# Patient Record
Sex: FEMALE
[De-identification: names, ages, dates, MRNs, and addresses within clinical notes are randomized per-mention and may not be internally consistent; named-entity substitution may affect disease eponyms.]

---

## 2022-07-30 ENCOUNTER — TELEPHONE ENCOUNTER (OUTPATIENT)
Age: 67
End: 2022-07-30

## 2022-07-31 ENCOUNTER — TELEPHONE ENCOUNTER (OUTPATIENT)
Age: 67
End: 2022-07-31

## 2023-07-25 ENCOUNTER — OFFICE VISIT (OUTPATIENT)
Dept: FAMILY MEDICINE CLINIC | Facility: CLINIC | Age: 68
End: 2023-07-25
Payer: COMMERCIAL

## 2023-07-25 VITALS
WEIGHT: 191 LBS | OXYGEN SATURATION: 98 % | HEART RATE: 71 BPM | DIASTOLIC BLOOD PRESSURE: 85 MMHG | SYSTOLIC BLOOD PRESSURE: 133 MMHG | TEMPERATURE: 96.2 F | HEIGHT: 67 IN | BODY MASS INDEX: 29.98 KG/M2

## 2023-07-25 DIAGNOSIS — R42 DIZZINESS: ICD-10-CM

## 2023-07-25 DIAGNOSIS — Z76.89 ENCOUNTER TO ESTABLISH CARE: ICD-10-CM

## 2023-07-25 DIAGNOSIS — Z79.899 LONG TERM USE OF DRUG: ICD-10-CM

## 2023-07-25 DIAGNOSIS — E03.9 HYPOTHYROIDISM, UNSPECIFIED TYPE: ICD-10-CM

## 2023-07-25 DIAGNOSIS — K86.1 CHRONIC PANCREATITIS, UNSPECIFIED PANCREATITIS TYPE: Primary | ICD-10-CM

## 2023-07-25 DIAGNOSIS — E11.65 TYPE 2 DIABETES MELLITUS WITH HYPERGLYCEMIA, WITHOUT LONG-TERM CURRENT USE OF INSULIN: ICD-10-CM

## 2023-07-25 LAB
POC AMPHETAMINES: NEGATIVE
POC BARBITURATES: NEGATIVE
POC BENZODIAZEPHINES: NEGATIVE
POC COCAINE: NEGATIVE
POC METHADONE: NEGATIVE
POC METHAMPHETAMINE SCREEN URINE: NEGATIVE
POC OPIATES: NEGATIVE
POC OXYCODONE: NEGATIVE
POC PHENCYCLIDINE: NEGATIVE
POC PROPOXYPHENE: NEGATIVE
POC THC: NEGATIVE
POC TRICYCLIC ANTIDEPRESSANTS: NEGATIVE

## 2023-07-25 PROCEDURE — 99204 OFFICE O/P NEW MOD 45 MIN: CPT

## 2023-07-25 PROCEDURE — 80305 DRUG TEST PRSMV DIR OPT OBS: CPT

## 2023-07-25 RX ORDER — MAGNESIUM GLUCONATE 27 MG(500)
27 TABLET ORAL 2 TIMES DAILY
COMMUNITY

## 2023-07-25 RX ORDER — CHLORAL HYDRATE 500 MG
CAPSULE ORAL
COMMUNITY

## 2023-07-25 RX ORDER — ONDANSETRON 4 MG/1
4 TABLET, FILM COATED ORAL EVERY 8 HOURS PRN
COMMUNITY

## 2023-07-25 RX ORDER — LEVOTHYROXINE SODIUM 0.15 MG/1
150 TABLET ORAL DAILY
COMMUNITY
Start: 2023-05-10

## 2023-07-25 RX ORDER — HYDROXYZINE HYDROCHLORIDE 25 MG/1
25 TABLET, FILM COATED ORAL 3 TIMES DAILY PRN
Qty: 90 TABLET | Refills: 0 | Status: SHIPPED | OUTPATIENT
Start: 2023-07-25

## 2023-07-25 RX ORDER — MONTELUKAST SODIUM 10 MG/1
1 TABLET ORAL DAILY
COMMUNITY
Start: 2023-06-14

## 2023-07-25 RX ORDER — GLIPIZIDE 10 MG/1
10 TABLET ORAL
COMMUNITY
Start: 2023-06-14

## 2023-07-25 RX ORDER — INSULIN DETEMIR 100 [IU]/ML
8 INJECTION, SOLUTION SUBCUTANEOUS DAILY
Qty: 3 ML | Refills: 2 | Status: SHIPPED | OUTPATIENT
Start: 2023-07-25

## 2023-07-25 RX ORDER — PEN NEEDLE, DIABETIC 30 GX3/16"
1 NEEDLE, DISPOSABLE MISCELLANEOUS DAILY
Qty: 30 EACH | Refills: 2 | Status: SHIPPED | OUTPATIENT
Start: 2023-07-25

## 2023-07-25 RX ORDER — METHOCARBAMOL 500 MG/1
500 TABLET, FILM COATED ORAL DAILY PRN
COMMUNITY

## 2023-07-25 RX ORDER — ESTRADIOL 0.1 MG/G
2 CREAM VAGINAL DAILY PRN
COMMUNITY

## 2023-07-25 RX ORDER — LEVOTHYROXINE SODIUM 175 UG/1
175 TABLET ORAL DAILY
COMMUNITY
Start: 2023-05-10

## 2023-07-25 RX ORDER — ALBUTEROL SULFATE 90 UG/1
2 AEROSOL, METERED RESPIRATORY (INHALATION) EVERY 4 HOURS PRN
COMMUNITY

## 2023-07-25 RX ORDER — CLONAZEPAM 0.5 MG/1
0.5 TABLET ORAL NIGHTLY PRN
COMMUNITY
Start: 2023-03-30

## 2023-07-25 RX ORDER — CYANOCOBALAMIN (VITAMIN B-12) 1000 MCG
TABLET ORAL
COMMUNITY

## 2023-07-25 RX ORDER — SERTRALINE HYDROCHLORIDE 100 MG/1
100 TABLET, FILM COATED ORAL DAILY
COMMUNITY
Start: 2023-07-12

## 2023-07-25 RX ORDER — OMEPRAZOLE 40 MG/1
40 CAPSULE, DELAYED RELEASE ORAL DAILY
COMMUNITY

## 2023-07-25 RX ORDER — ALBUTEROL SULFATE 2.5 MG/.5ML
2.5 SOLUTION RESPIRATORY (INHALATION)
COMMUNITY

## 2023-07-25 RX ORDER — SODIUM PHOSPHATE,MONO-DIBASIC 19G-7G/118
ENEMA (ML) RECTAL
COMMUNITY

## 2023-07-25 RX ORDER — RIZATRIPTAN BENZOATE 10 MG/1
10 TABLET ORAL ONCE AS NEEDED
COMMUNITY
Start: 2023-03-30

## 2023-07-25 NOTE — PROGRESS NOTES
"Chief Complaint  Blood Sugar Problem and Pancreititis (Hx of/)    Subjective        Charlene Bey presents to [facility]IMARY [facility]  History of Present Illness  Patient is 67-year-old female who presents today as a new patient to establish care along with complaints of her blood glucose and pancreatitis. Reports her blood glucose has been running high over the last day. Patient looked at the report on her monitor and read of her blood glucose starting at 124 on 7/24 with it jumping to 257, 289, then 267 that evening. Reports her blood glucose this morning was 178 upon waking with it jumping to 247 after breakfast that consisted of toast, coffee, and a small amount of a protein shake. She describes using stevia for her sweetener in her coffee and the protein shake being a diabetic one.     Describes a history of having chronic pancreatitis throughout her life from childhood. She is currently experiencing nausea, abdominal pain, pruritus, and chalky grey stool. Nausea has been worsening lately with her being unable to keep food down. Describes the abdominal pain as if there is a pole through her abdomen starting in the epigastric region to the left under her ribs and radiating to her back. She has been taking ondansetron to be able to tolerate the nausea. Has been using tylenol for pain and is not wanting to take any stronger medication at this time.     She describes being dizzy and that things feel \"sloshy\" with things spinning. This is aggravated with turning her head and position changes. Is unsteady upon standing up. Reports a history of labyrinthitis.     Past Medical History:   Diagnosis Date    Cystocele with rectocele     Diabetes mellitus     Goiter     Hashimoto's thyroiditis     Headache     Hypothyroidism     Mumps pancreatitis     Pancreatitis     Peptic ulceration     Thyroid nodule      Past Surgical History:   Procedure Laterality Date    BLADDER SUSPENSION      CHOLECYSTECTOMY      " "COLOSTOMY      CYSTOCELE REPAIR      HERNIA REPAIR      HYSTERECTOMY      OVARIAN CYST REMOVAL      RECTOCELE REPAIR      SMALL INTESTINE SURGERY      TONSILLECTOMY       Social History     Socioeconomic History    Marital status:    Tobacco Use    Smoking status: Never    Smokeless tobacco: Never   Vaping Use    Vaping Use: Never used   Substance and Sexual Activity    Alcohol use: Never    Drug use: Never     Family History   Problem Relation Age of Onset    Cancer Mother     Stroke Father     Hypertension Father        Objective   Vital Signs:  /85 (BP Location: Left arm, Patient Position: Sitting, Cuff Size: Adult)   Pulse 71   Temp 96.2 °F (35.7 °C) (Infrared)   Ht 170.2 cm (67\")   Wt 86.6 kg (191 lb)   SpO2 98%   BMI 29.91 kg/m²   Estimated body mass index is 29.91 kg/m² as calculated from the following:    Height as of this encounter: 170.2 cm (67\").    Weight as of this encounter: 86.6 kg (191 lb).         PHQ-9 Depression Screening  Little interest or pleasure in doing things? 0-->not at all   Feeling down, depressed, or hopeless? 0-->not at all   Trouble falling or staying asleep, or sleeping too much?     Feeling tired or having little energy?     Poor appetite or overeating?     Feeling bad about yourself - or that you are a failure or have let yourself or your family down?     Trouble concentrating on things, such as reading the newspaper or watching television?     Moving or speaking so slowly that other people could have noticed? Or the opposite - being so fidgety or restless that you have been moving around a lot more than usual?     Thoughts that you would be better off dead, or of hurting yourself in some way?     PHQ-9 Total Score 0   If you checked off any problems, how difficult have these problems made it for you to do your work, take care of things at home, or get along with other people?        VALERIANO-7: Over the last two weeks, how often have you been bothered by the " following problems?  Feeling nervous, anxious or on edge: Not at all  Not being able to stop or control worrying: Not at all  Worrying too much about different things: Not at all  Trouble Relaxing: Not at all  Being so restless that it is hard to sit still: Not at all  Becoming easily annoyed or irritable: Not at all  Feeling afraid as if something awful might happen: Not at all  VALERIANO 7 Total Score: 0  If you checked any problems, how difficult have these problems made it for you to do your work, take care of things at home, or get along with other people: Not difficult at all      Physical Exam  Vitals and nursing note reviewed.   Constitutional:       Appearance: Normal appearance.   HENT:      Head: Normocephalic.      Right Ear: Tympanic membrane normal.      Left Ear: Tympanic membrane normal.      Nose: Nose normal.      Mouth/Throat:      Mouth: Mucous membranes are moist.   Eyes:      Pupils: Pupils are equal, round, and reactive to light.   Cardiovascular:      Rate and Rhythm: Normal rate and regular rhythm.      Pulses: Normal pulses.      Heart sounds: Normal heart sounds. No murmur heard.  Pulmonary:      Effort: Pulmonary effort is normal. No respiratory distress.      Breath sounds: Normal breath sounds.   Abdominal:      General: Bowel sounds are normal. There is no distension.      Palpations: Abdomen is soft.      Tenderness: There is abdominal tenderness (epigastric  region). There is guarding.   Musculoskeletal:         General: Normal range of motion.      Cervical back: Normal range of motion.   Skin:     General: Skin is warm and dry.      Capillary Refill: Capillary refill takes less than 2 seconds.   Neurological:      General: No focal deficit present.      Mental Status: She is alert.   Psychiatric:         Mood and Affect: Mood normal.      Result Review :                 Assessment and Plan   Diagnoses and all orders for this visit:    1. Chronic pancreatitis, unspecified pancreatitis  type (Primary)  -     CBC w AUTO Differential  -     Comprehensive Metabolic Panel  -     Amylase  -     Lipase    2. Type 2 diabetes mellitus with hyperglycemia, without long-term current use of insulin  -     CBC w AUTO Differential  -     Comprehensive Metabolic Panel  -     Hemoglobin A1c    3. Dizziness  -     hydrOXYzine (ATARAX) 25 MG tablet; Take 1 tablet by mouth 3 (Three) Times a Day As Needed (Dizziness).  Dispense: 90 tablet; Refill: 0    4. Hypothyroidism, unspecified type  -     TSH    5. Encounter to establish care    - Discussed liquid diet  - Continue to check blood glucose, will start low dose of insulin detemir nightly             Follow Up   Return in about 4 weeks (around 8/22/2023) for Recheck.  Patient was given instructions and counseling regarding her condition or for health maintenance advice. Please see specific information pulled into the AVS if appropriate.

## 2023-07-26 LAB
ALBUMIN SERPL-MCNC: 4.9 G/DL (ref 3.9–4.9)
ALBUMIN/GLOB SERPL: 1.6 {RATIO} (ref 1.2–2.2)
ALP SERPL-CCNC: 82 IU/L (ref 44–121)
ALT SERPL-CCNC: 21 IU/L (ref 0–32)
AMYLASE SERPL-CCNC: 45 U/L (ref 31–110)
AST SERPL-CCNC: 21 IU/L (ref 0–40)
BASOPHILS # BLD AUTO: 0 X10E3/UL (ref 0–0.2)
BASOPHILS NFR BLD AUTO: 0 %
BILIRUB SERPL-MCNC: 0.3 MG/DL (ref 0–1.2)
BUN SERPL-MCNC: 15 MG/DL (ref 8–27)
BUN/CREAT SERPL: 19 (ref 12–28)
CALCIUM SERPL-MCNC: 10.2 MG/DL (ref 8.7–10.3)
CHLORIDE SERPL-SCNC: 102 MMOL/L (ref 96–106)
CO2 SERPL-SCNC: 22 MMOL/L (ref 20–29)
CREAT SERPL-MCNC: 0.78 MG/DL (ref 0.57–1)
EGFRCR SERPLBLD CKD-EPI 2021: 83 ML/MIN/1.73
EOSINOPHIL # BLD AUTO: 0.1 X10E3/UL (ref 0–0.4)
EOSINOPHIL NFR BLD AUTO: 2 %
ERYTHROCYTE [DISTWIDTH] IN BLOOD BY AUTOMATED COUNT: 14.6 % (ref 11.7–15.4)
GLOBULIN SER CALC-MCNC: 3.1 G/DL (ref 1.5–4.5)
GLUCOSE SERPL-MCNC: 151 MG/DL (ref 70–99)
HBA1C MFR BLD: 8.1 % (ref 4.8–5.6)
HCT VFR BLD AUTO: 42 % (ref 34–46.6)
HGB BLD-MCNC: 14.1 G/DL (ref 11.1–15.9)
IMM GRANULOCYTES # BLD AUTO: 0 X10E3/UL (ref 0–0.1)
IMM GRANULOCYTES NFR BLD AUTO: 0 %
LIPASE SERPL-CCNC: 19 U/L (ref 14–72)
LYMPHOCYTES # BLD AUTO: 3.3 X10E3/UL (ref 0.7–3.1)
LYMPHOCYTES NFR BLD AUTO: 35 %
MCH RBC QN AUTO: 27.3 PG (ref 26.6–33)
MCHC RBC AUTO-ENTMCNC: 33.6 G/DL (ref 31.5–35.7)
MCV RBC AUTO: 81 FL (ref 79–97)
MONOCYTES # BLD AUTO: 0.6 X10E3/UL (ref 0.1–0.9)
MONOCYTES NFR BLD AUTO: 7 %
NEUTROPHILS # BLD AUTO: 5.4 X10E3/UL (ref 1.4–7)
NEUTROPHILS NFR BLD AUTO: 56 %
PLATELET # BLD AUTO: 215 X10E3/UL (ref 150–450)
POTASSIUM SERPL-SCNC: 4.2 MMOL/L (ref 3.5–5.2)
PROT SERPL-MCNC: 8 G/DL (ref 6–8.5)
RBC # BLD AUTO: 5.16 X10E6/UL (ref 3.77–5.28)
SODIUM SERPL-SCNC: 139 MMOL/L (ref 134–144)
TSH SERPL DL<=0.005 MIU/L-ACNC: 5 UIU/ML (ref 0.45–4.5)
WBC # BLD AUTO: 9.4 X10E3/UL (ref 3.4–10.8)

## 2023-08-23 ENCOUNTER — OFFICE VISIT (OUTPATIENT)
Dept: INTERNAL MEDICINE | Facility: CLINIC | Age: 68
End: 2023-08-23
Payer: COMMERCIAL

## 2023-08-23 ENCOUNTER — PATIENT ROUNDING (BHMG ONLY) (OUTPATIENT)
Dept: INTERNAL MEDICINE | Facility: CLINIC | Age: 68
End: 2023-08-23

## 2023-08-23 VITALS
DIASTOLIC BLOOD PRESSURE: 78 MMHG | TEMPERATURE: 96 F | HEART RATE: 64 BPM | HEIGHT: 67 IN | SYSTOLIC BLOOD PRESSURE: 131 MMHG | OXYGEN SATURATION: 98 % | BODY MASS INDEX: 30.92 KG/M2 | WEIGHT: 197 LBS

## 2023-08-23 DIAGNOSIS — M79.606 LOWER EXTREMITY PAIN, POSTERIOR, UNSPECIFIED LATERALITY: ICD-10-CM

## 2023-08-23 DIAGNOSIS — R11.0 NAUSEA: ICD-10-CM

## 2023-08-23 DIAGNOSIS — R42 DIZZINESS: ICD-10-CM

## 2023-08-23 DIAGNOSIS — E11.65 TYPE 2 DIABETES MELLITUS WITH HYPERGLYCEMIA, UNSPECIFIED WHETHER LONG TERM INSULIN USE: Primary | ICD-10-CM

## 2023-08-23 DIAGNOSIS — E66.9 CLASS 1 OBESITY WITHOUT SERIOUS COMORBIDITY WITH BODY MASS INDEX (BMI) OF 30.0 TO 30.9 IN ADULT, UNSPECIFIED OBESITY TYPE: ICD-10-CM

## 2023-08-23 DIAGNOSIS — M62.838 MUSCLE SPASMS OF BOTH LOWER EXTREMITIES: ICD-10-CM

## 2023-08-23 PROCEDURE — 99213 OFFICE O/P EST LOW 20 MIN: CPT

## 2023-08-23 RX ORDER — ONDANSETRON 4 MG/1
4 TABLET, FILM COATED ORAL EVERY 8 HOURS PRN
Qty: 30 TABLET | Refills: 2 | Status: SHIPPED | OUTPATIENT
Start: 2023-08-23

## 2023-08-23 RX ORDER — HYDROXYZINE HYDROCHLORIDE 25 MG/1
25 TABLET, FILM COATED ORAL DAILY PRN
Qty: 90 TABLET | Refills: 0 | Status: SHIPPED | OUTPATIENT
Start: 2023-08-23

## 2023-08-23 RX ORDER — METHOCARBAMOL 500 MG/1
500 TABLET, FILM COATED ORAL DAILY PRN
Qty: 90 TABLET | Refills: 0 | Status: SHIPPED | OUTPATIENT
Start: 2023-08-23

## 2023-08-23 NOTE — PROGRESS NOTES
August 23, 2023    Hello, may I speak with Charlene Bey?    My name is Swetha      [facility] am  [facility]  [address] [place] [address] [phone].    Before we get started may I verify your date of birth? 1955    I am calling to officially welcome you to our practice and ask about your recent visit. Is this a good time to talk? Yes    Tell me about your visit with us. What things went well?  I just loved, loved Alex, he is a pumpkin.        We're always looking for ways to make our patients' experiences even better. Do you have recommendations on ways we may improve?  No, it is so wonderful here.    Overall were you satisfied with your first visit to our practice?  Yes, very much       I appreciate you taking the time to speak with me today. Is there anything else I can do for you? No      Thank you, and have a great day.

## 2023-08-23 NOTE — PROGRESS NOTES
Chief Complaint  Diabetes (Follow up)    Subjective        Charlene Bey presents to [facility]  History of Present Illness  Patient is a 67-year-old female presenting today following up on her health.     Reports her blood glucose was 167 this morning. Describes her blood glucose being more controlled and has only seen it going up between 180 to 190 after eating. Has been finding her glucose to be in the normal range at times.     Continues to report having dizziness and itching. Feels the dizziness is slowly resolving with it not happening as often and not being as severe. Does complain of being sore and is attributing this to tensing her body up to keep her balance. Is working on weaning down the number of times taking atarax as she has found this has caused some headaches for her.     Complaining of pain in her upper posterior legs. This is exacerbated with doing yard work and sitting. She has been needing to use pillows to sit on such as when in the car or sitting at a desk. Reports no longer being able to sit in the floor. Has noticed she is gaining weight and is attributing this to not getting out as much with the pain preventing her from being active.     Past Medical History:   Diagnosis Date    Cystocele with rectocele     Diabetes mellitus     Goiter     Hashimoto's thyroiditis     Headache     Hypothyroidism     Mumps pancreatitis     Pancreatitis     Peptic ulceration     Thyroid nodule      Past Surgical History:   Procedure Laterality Date    BLADDER SUSPENSION      CHOLECYSTECTOMY      COLOSTOMY      CYSTOCELE REPAIR      HERNIA REPAIR      HYSTERECTOMY      OVARIAN CYST REMOVAL      RECTOCELE REPAIR      SMALL INTESTINE SURGERY      TONSILLECTOMY       Social History     Socioeconomic History    Marital status:    Tobacco Use    Smoking status: Never    Smokeless tobacco: Never   Vaping Use    Vaping Use: Never used   Substance and Sexual Activity    Alcohol use:  "Never    Drug use: Never     Family History   Problem Relation Age of Onset    Cancer Mother     Stroke Father     Hypertension Father        Objective   Vital Signs:  /78 (BP Location: Left arm, Patient Position: Sitting, Cuff Size: Adult)   Pulse 64   Temp 96 øF (35.6 øC) (Infrared)   Ht 170.2 cm (67\")   Wt 89.4 kg (197 lb)   SpO2 98%   BMI 30.85 kg/mý   Estimated body mass index is 30.85 kg/mý as calculated from the following:    Height as of this encounter: 170.2 cm (67\").    Weight as of this encounter: 89.4 kg (197 lb).       BMI is >= 30 and <35. (Class 1 Obesity). The following options were offered after discussion;: exercise counseling/recommendations and nutrition counseling/recommendations          Physical Exam  Vitals and nursing note reviewed.   Constitutional:       Appearance: Normal appearance.   HENT:      Head: Normocephalic.      Nose: Nose normal.      Mouth/Throat:      Mouth: Mucous membranes are moist.   Eyes:      Pupils: Pupils are equal, round, and reactive to light.   Cardiovascular:      Rate and Rhythm: Normal rate and regular rhythm.      Pulses: Normal pulses.   Pulmonary:      Effort: Pulmonary effort is normal. No respiratory distress.   Abdominal:      General: Bowel sounds are normal.      Palpations: Abdomen is soft.   Musculoskeletal:         General: Normal range of motion.      Cervical back: Normal range of motion and neck supple.   Skin:     General: Skin is warm and dry.      Capillary Refill: Capillary refill takes less than 2 seconds.   Neurological:      General: No focal deficit present.      Mental Status: She is alert.   Psychiatric:         Mood and Affect: Mood normal.      Result Review :  The following data was reviewed by: GREGORIO Howe on 08/23/2023:  Common labs          7/25/2023    14:17 8/22/2023    23:00   Common Labs   Glucose 151     BUN 15     Creatinine 0.78     Sodium 139     Potassium 4.2     Chloride 102     Calcium 10.2     Total " Protein 8.0     Albumin 4.9     Total Bilirubin 0.3     Alkaline Phosphatase 82     AST (SGOT) 21     ALT (SGPT) 21     WBC 9.4     Hemoglobin 14.1     Hematocrit 42.0     Platelets 215     Hemoglobin A1C 8.1     Microalbumin, Urine  17.7                   Assessment and Plan   Diagnoses and all orders for this visit:    1. Type 2 diabetes mellitus with hyperglycemia, unspecified whether long term insulin use (Primary)  -     Microalbumin / Creatinine Urine Ratio - Urine, Clean Catch    2. Dizziness  -     hydrOXYzine (ATARAX) 25 MG tablet; Take 1 tablet by mouth Daily As Needed (Dizziness).  Dispense: 90 tablet; Refill: 0  -     Ambulatory Referral to Physical Therapy Evaluate and treat    3. Muscle spasms of both lower extremities  -     methocarbamol (ROBAXIN) 500 MG tablet; Take 1 tablet by mouth Daily As Needed for Muscle Spasms.  Dispense: 90 tablet; Refill: 0  -     Ambulatory Referral to Physical Therapy Evaluate and treat    4. Nausea  -     ondansetron (ZOFRAN) 4 MG tablet; Take 1 tablet by mouth Every 8 (Eight) Hours As Needed for Nausea or Vomiting.  Dispense: 30 tablet; Refill: 2    5. Lower extremity pain, posterior, unspecified laterality  -     Ambulatory Referral to Physical Therapy Evaluate and treat    6. Class 1 obesity without serious comorbidity with body mass index (BMI) of 30.0 to 30.9 in adult, unspecified obesity type             Follow Up   Return in about 3 months (around 11/23/2023) for Recheck.  Patient was given instructions and counseling regarding her condition or for health maintenance advice. Please see specific information pulled into the AVS if appropriate.

## 2023-08-24 LAB
ALBUMIN/CREAT UR: 42 MG/G CREAT (ref 0–29)
CREAT UR-MCNC: 42.4 MG/DL
MICROALBUMIN UR-MCNC: 17.7 UG/ML

## 2023-09-13 DIAGNOSIS — E03.9 HYPOTHYROIDISM, UNSPECIFIED: ICD-10-CM

## 2023-09-13 RX ORDER — LEVOTHYROXINE SODIUM 175 UG/1
TABLET ORAL
Qty: 90 TABLET | Refills: 1 | Status: SHIPPED | OUTPATIENT
Start: 2023-09-13

## 2023-09-13 NOTE — TELEPHONE ENCOUNTER
Rx Refill Note  Requested Prescriptions     Pending Prescriptions Disp Refills    levothyroxine (SYNTHROID, LEVOTHROID) 175 MCG tablet [Pharmacy Med Name: LEVOTHYROXINE 175 MCG TABLET] 90 tablet      Si TABLET IN THE MORNING ON AN EMPTY STOMACH ORALLY MONDAY, WEDNESDAY, FRIDAY 90 DAYS      Last office visit with prescribing clinician: 2023   Last telemedicine visit with prescribing clinician: Visit date not found   Next office visit with prescribing clinician: 2023                         Would you like a call back once the refill request has been completed: [] Yes [] No    If the office needs to give you a call back, can they leave a voicemail: [] Yes [] No    Miley Sherwood MA  23, 10:22 CDT

## 2023-09-22 ENCOUNTER — OFFICE VISIT (OUTPATIENT)
Dept: FAMILY MEDICINE CLINIC | Facility: CLINIC | Age: 68
End: 2023-09-22
Payer: COMMERCIAL

## 2023-09-22 VITALS
DIASTOLIC BLOOD PRESSURE: 78 MMHG | SYSTOLIC BLOOD PRESSURE: 131 MMHG | HEIGHT: 67 IN | OXYGEN SATURATION: 99 % | HEART RATE: 64 BPM | BODY MASS INDEX: 30.92 KG/M2 | RESPIRATION RATE: 20 BRPM | WEIGHT: 197 LBS

## 2023-09-22 DIAGNOSIS — H01.004 BLEPHARITIS OF UPPER EYELIDS OF BOTH EYES, UNSPECIFIED TYPE: Primary | ICD-10-CM

## 2023-09-22 DIAGNOSIS — H01.001 BLEPHARITIS OF UPPER EYELIDS OF BOTH EYES, UNSPECIFIED TYPE: Primary | ICD-10-CM

## 2023-09-22 PROCEDURE — 99213 OFFICE O/P EST LOW 20 MIN: CPT | Performed by: NURSE PRACTITIONER

## 2023-09-22 RX ORDER — NEOMYCIN SULFATE, POLYMYXIN B SULFATE, BACITRACIN ZINC, HYDROCORTISONE 3.5; 10000; 400; 1 MG/G; [USP'U]/G; [USP'U]/G; MG/G
OINTMENT OPHTHALMIC 2 TIMES DAILY
Qty: 3.5 G | Refills: 0 | Status: SHIPPED | OUTPATIENT
Start: 2023-09-22

## 2023-09-22 RX ORDER — CEPHALEXIN 500 MG/1
500 CAPSULE ORAL 2 TIMES DAILY
Qty: 14 CAPSULE | Refills: 0 | Status: SHIPPED | OUTPATIENT
Start: 2023-09-22

## 2023-09-22 NOTE — PROGRESS NOTES
"Chief Complaint  swollen eye (Mrs. Bey is here for a swollen eye. )    Subjective        Charlene Bey presents to [facility]  History of Present Illness    Charlene Bey is a 67-year-old female who presents to the clinic today for right eye swelling and warmth.    The patient reports that she had what she thought was conjunctivitis in her left eye that started on 09/19/2023 but by 09/21/2023, she was fine. She woke up this morning, 09/22/2023, and noticed right ocular edema, fever, and rhinorrhea. She mentions she feels like there is grit in the eye and it is painful. She confirms that she has had these symptoms before. She denies any changes in vision, discharge, or crusting. She has taken Keflex in the past.      Objective   Vital Signs:  /78 (BP Location: Left arm, Patient Position: Sitting, Cuff Size: Adult)   Pulse 64   Resp 20   Ht 170.2 cm (67.01\")   Wt 89.4 kg (197 lb)   SpO2 99%   BMI 30.85 kg/m²   Estimated body mass index is 30.85 kg/m² as calculated from the following:    Height as of this encounter: 170.2 cm (67.01\").    Weight as of this encounter: 89.4 kg (197 lb).               Physical Exam  Vitals and nursing note reviewed.   Constitutional:       Appearance: She is well-developed.   HENT:      Head: Normocephalic and atraumatic.   Eyes:      General: Vision grossly intact.      Extraocular Movements: Extraocular movements intact.      Conjunctiva/sclera: Conjunctivae normal.        Comments: Swollen, erythematous   Cardiovascular:      Rate and Rhythm: Normal rate and regular rhythm.   Pulmonary:      Effort: Pulmonary effort is normal.   Musculoskeletal:      Cervical back: Normal range of motion.   Skin:     General: Skin is warm and dry.   Neurological:      General: No focal deficit present.      Mental Status: She is alert and oriented to person, place, and time.   Psychiatric:         Mood and Affect: Mood normal.         Behavior: Behavior " normal.      Result Review :                 Assessment and Plan   Diagnoses and all orders for this visit:    1. Blepharitis of upper eyelids of both eyes, unspecified type (Primary)  -     neomycin-bacitracin-polymyxin-hydrocortisone (CORTISPORIN) 1 % ophthalmic ointment; Administer  to both eyes 2 (Two) Times a Day.  Dispense: 3.5 g; Refill: 0  -     cephalexin (Keflex) 500 MG capsule; Take 1 capsule by mouth 2 (Two) Times a Day.  Dispense: 14 capsule; Refill: 0      Plan:  Will treat with topical and oral antibiotics  Recommend warm compresses  Continue antihistamines  Will let me know if not better on Monday and will send in steroid pack            - If her symptoms do not improve by Monday, 09/25/2023, she will contact the office for steroid prescription.      Transcribed from ambient dictation for GREGORIO Todd by Edith Jara.  09/22/23   12:58 CDT    Patient or patient representative verbalized consent to the visit recording.  I have personally performed the services described in this document as transcribed by the above individual, and it is both accurate and complete.      Follow Up   Return if symptoms worsen or fail to improve.  Patient was given instructions and counseling regarding her condition or for health maintenance advice. Please see specific information pulled into the AVS if appropriate.

## 2023-10-11 ENCOUNTER — TELEPHONE (OUTPATIENT)
Dept: INTERNAL MEDICINE | Facility: CLINIC | Age: 68
End: 2023-10-11

## 2023-10-11 DIAGNOSIS — U07.1 COVID-19: Primary | ICD-10-CM

## 2023-10-11 RX ORDER — BROMPHENIRAMINE MALEATE, PSEUDOEPHEDRINE HYDROCHLORIDE, AND DEXTROMETHORPHAN HYDROBROMIDE 2; 30; 10 MG/5ML; MG/5ML; MG/5ML
5 SYRUP ORAL EVERY 6 HOURS PRN
Qty: 120 ML | Refills: 0 | Status: ON HOLD | OUTPATIENT
Start: 2023-10-11 | End: 2023-10-16

## 2023-10-11 NOTE — TELEPHONE ENCOUNTER
Caller: Charlene Bey    Relationship: Self    Best call back number: [phone]     What medication are you requesting: PAXLOVID    What are your current symptoms: TESTED POSITIVE FOR COVID ON 10.11.23, RUNNY NOSE, HOARSE, HEADACHE, BODY ACHES, COUGH, SORE THROAT    How long have you been experiencing symptoms: ONE DAY    Have you had these symptoms before:    [] Yes  [x] No    Have you been treated for these symptoms before:   [] Yes  [x] No    If a prescription is needed, what is your preferred pharmacy and phone number: [facility]/PHARMACY #[id number] - [address] [phone] [facility] [phone]      Additional notes: PATIENT WOULD LIKE SOPHIE CALLED IN TO HELP HER WITH THE SYMPTOMS. PLEASE CALL BACK WHEN THIS HAS BEEN CALLED IN.

## 2023-10-16 ENCOUNTER — HOSPITAL ENCOUNTER (OUTPATIENT)
Facility: HOSPITAL | Age: 68
Setting detail: OBSERVATION
Discharge: HOME | End: 2023-10-20
Attending: STUDENT IN AN ORGANIZED HEALTH CARE EDUCATION/TRAINING PROGRAM | Admitting: STUDENT IN AN ORGANIZED HEALTH CARE EDUCATION/TRAINING PROGRAM
Payer: COMMERCIAL

## 2023-10-16 ENCOUNTER — APPOINTMENT (OUTPATIENT)
Dept: MRI IMAGING | Facility: HOSPITAL | Age: 68
End: 2023-10-16
Payer: COMMERCIAL

## 2023-10-16 DIAGNOSIS — M79.609 PARESTHESIA AND PAIN OF RIGHT EXTREMITY: ICD-10-CM

## 2023-10-16 DIAGNOSIS — R26.9 GAIT ABNORMALITY: Primary | ICD-10-CM

## 2023-10-16 DIAGNOSIS — R20.2 PARESTHESIA AND PAIN OF RIGHT EXTREMITY: ICD-10-CM

## 2023-10-16 PROBLEM — K86.1 CHRONIC PANCREATITIS: Status: ACTIVE | Noted: 2023-10-16

## 2023-10-16 PROBLEM — E11.65 TYPE 2 DIABETES MELLITUS WITH HYPERGLYCEMIA: Status: ACTIVE | Noted: 2023-10-16

## 2023-10-16 PROBLEM — E03.9 HYPOTHYROIDISM (ACQUIRED): Status: ACTIVE | Noted: 2023-10-16

## 2023-10-16 PROBLEM — G43.009 NONINTRACTABLE MIGRAINE: Status: ACTIVE | Noted: 2023-10-16

## 2023-10-16 PROBLEM — R42 DIZZINESS, NONSPECIFIC: Status: ACTIVE | Noted: 2023-10-16

## 2023-10-16 LAB
ALBUMIN SERPL-MCNC: 4.5 G/DL (ref 3.5–5.2)
ALBUMIN/GLOB SERPL: 1.7 G/DL
ALP SERPL-CCNC: 63 U/L (ref 39–117)
ALT SERPL W P-5'-P-CCNC: 16 U/L (ref 1–33)
ANION GAP SERPL CALCULATED.3IONS-SCNC: 8 MMOL/L (ref 5–15)
AST SERPL-CCNC: 15 U/L (ref 1–32)
BASOPHILS # BLD AUTO: 0.01 10*3/MM3 (ref 0–0.2)
BASOPHILS NFR BLD AUTO: 0.1 % (ref 0–1.5)
BILIRUB SERPL-MCNC: 0.2 MG/DL (ref 0–1.2)
BUN SERPL-MCNC: 16 MG/DL (ref 8–23)
BUN/CREAT SERPL: 21.3 (ref 7–25)
CALCIUM SPEC-SCNC: 9.2 MG/DL (ref 8.6–10.5)
CHLORIDE SERPL-SCNC: 104 MMOL/L (ref 98–107)
CHOLEST SERPL-MCNC: 180 MG/DL (ref 0–200)
CO2 SERPL-SCNC: 27 MMOL/L (ref 22–29)
CREAT SERPL-MCNC: 0.75 MG/DL (ref 0.57–1)
CRP SERPL-MCNC: <0.3 MG/DL (ref 0–0.5)
DEPRECATED RDW RBC AUTO: 46.8 FL (ref 37–54)
EGFRCR SERPLBLD CKD-EPI 2021: 87.4 ML/MIN/1.73
EOSINOPHIL # BLD AUTO: 0.04 10*3/MM3 (ref 0–0.4)
EOSINOPHIL NFR BLD AUTO: 0.5 % (ref 0.3–6.2)
ERYTHROCYTE [DISTWIDTH] IN BLOOD BY AUTOMATED COUNT: 15.3 % (ref 12.3–15.4)
ERYTHROCYTE [SEDIMENTATION RATE] IN BLOOD: 6 MM/HR (ref 0–30)
GLOBULIN UR ELPH-MCNC: 2.6 GM/DL
GLUCOSE BLDC GLUCOMTR-MCNC: 172 MG/DL (ref 70–130)
GLUCOSE BLDC GLUCOMTR-MCNC: 221 MG/DL (ref 70–130)
GLUCOSE SERPL-MCNC: 195 MG/DL (ref 65–99)
HBA1C MFR BLD: 7.8 % (ref 4.8–5.6)
HCT VFR BLD AUTO: 38.6 % (ref 34–46.6)
HDLC SERPL-MCNC: 34 MG/DL (ref 40–60)
HGB BLD-MCNC: 12.5 G/DL (ref 12–15.9)
IMM GRANULOCYTES # BLD AUTO: 0.02 10*3/MM3 (ref 0–0.05)
IMM GRANULOCYTES NFR BLD AUTO: 0.2 % (ref 0–0.5)
LDLC SERPL CALC-MCNC: 124 MG/DL (ref 0–100)
LDLC/HDLC SERPL: 3.57 {RATIO}
LYMPHOCYTES # BLD AUTO: 2.53 10*3/MM3 (ref 0.7–3.1)
LYMPHOCYTES NFR BLD AUTO: 31.2 % (ref 19.6–45.3)
MAGNESIUM SERPL-MCNC: 1.9 MG/DL (ref 1.6–2.4)
MCH RBC QN AUTO: 27.3 PG (ref 26.6–33)
MCHC RBC AUTO-ENTMCNC: 32.4 G/DL (ref 31.5–35.7)
MCV RBC AUTO: 84.3 FL (ref 79–97)
MONOCYTES # BLD AUTO: 0.34 10*3/MM3 (ref 0.1–0.9)
MONOCYTES NFR BLD AUTO: 4.2 % (ref 5–12)
NEUTROPHILS NFR BLD AUTO: 5.17 10*3/MM3 (ref 1.7–7)
NEUTROPHILS NFR BLD AUTO: 63.8 % (ref 42.7–76)
NRBC BLD AUTO-RTO: 0 /100 WBC (ref 0–0.2)
PHOSPHATE SERPL-MCNC: 2.7 MG/DL (ref 2.5–4.5)
PLATELET # BLD AUTO: 186 10*3/MM3 (ref 140–450)
PMV BLD AUTO: 9.7 FL (ref 6–12)
POTASSIUM SERPL-SCNC: 3.9 MMOL/L (ref 3.5–5.2)
PROT SERPL-MCNC: 7.1 G/DL (ref 6–8.5)
RBC # BLD AUTO: 4.58 10*6/MM3 (ref 3.77–5.28)
SARS-COV-2 RNA RESP QL NAA+PROBE: NOT DETECTED
SODIUM SERPL-SCNC: 139 MMOL/L (ref 136–145)
T4 FREE SERPL-MCNC: 0.96 NG/DL (ref 0.93–1.7)
TRIGL SERPL-MCNC: 123 MG/DL (ref 0–150)
TSH SERPL DL<=0.05 MIU/L-ACNC: 4.37 UIU/ML (ref 0.27–4.2)
VLDLC SERPL-MCNC: 22 MG/DL (ref 5–40)
WBC NRBC COR # BLD: 8.11 10*3/MM3 (ref 3.4–10.8)

## 2023-10-16 PROCEDURE — 82948 REAGENT STRIP/BLOOD GLUCOSE: CPT

## 2023-10-16 PROCEDURE — 82746 ASSAY OF FOLIC ACID SERUM: CPT | Performed by: STUDENT IN AN ORGANIZED HEALTH CARE EDUCATION/TRAINING PROGRAM

## 2023-10-16 PROCEDURE — 87635 SARS-COV-2 COVID-19 AMP PRB: CPT | Performed by: STUDENT IN AN ORGANIZED HEALTH CARE EDUCATION/TRAINING PROGRAM

## 2023-10-16 PROCEDURE — 86140 C-REACTIVE PROTEIN: CPT | Performed by: STUDENT IN AN ORGANIZED HEALTH CARE EDUCATION/TRAINING PROGRAM

## 2023-10-16 PROCEDURE — G0378 HOSPITAL OBSERVATION PER HR: HCPCS

## 2023-10-16 PROCEDURE — 63710000001 INSULIN LISPRO (HUMAN) PER 5 UNITS: Performed by: STUDENT IN AN ORGANIZED HEALTH CARE EDUCATION/TRAINING PROGRAM

## 2023-10-16 PROCEDURE — 84100 ASSAY OF PHOSPHORUS: CPT | Performed by: STUDENT IN AN ORGANIZED HEALTH CARE EDUCATION/TRAINING PROGRAM

## 2023-10-16 PROCEDURE — 83036 HEMOGLOBIN GLYCOSYLATED A1C: CPT | Performed by: STUDENT IN AN ORGANIZED HEALTH CARE EDUCATION/TRAINING PROGRAM

## 2023-10-16 PROCEDURE — G0379 DIRECT REFER HOSPITAL OBSERV: HCPCS

## 2023-10-16 PROCEDURE — 63710000001 INSULIN DETEMIR PER 5 UNITS: Performed by: STUDENT IN AN ORGANIZED HEALTH CARE EDUCATION/TRAINING PROGRAM

## 2023-10-16 PROCEDURE — 83735 ASSAY OF MAGNESIUM: CPT | Performed by: STUDENT IN AN ORGANIZED HEALTH CARE EDUCATION/TRAINING PROGRAM

## 2023-10-16 PROCEDURE — 80061 LIPID PANEL: CPT | Performed by: STUDENT IN AN ORGANIZED HEALTH CARE EDUCATION/TRAINING PROGRAM

## 2023-10-16 PROCEDURE — 72158 MRI LUMBAR SPINE W/O & W/DYE: CPT

## 2023-10-16 PROCEDURE — 84439 ASSAY OF FREE THYROXINE: CPT | Performed by: CLINICAL NURSE SPECIALIST

## 2023-10-16 PROCEDURE — 72157 MRI CHEST SPINE W/O & W/DYE: CPT

## 2023-10-16 PROCEDURE — 0 GADOBENATE DIMEGLUMINE 529 MG/ML SOLUTION: Performed by: STUDENT IN AN ORGANIZED HEALTH CARE EDUCATION/TRAINING PROGRAM

## 2023-10-16 PROCEDURE — 82607 VITAMIN B-12: CPT | Performed by: STUDENT IN AN ORGANIZED HEALTH CARE EDUCATION/TRAINING PROGRAM

## 2023-10-16 PROCEDURE — 99222 1ST HOSP IP/OBS MODERATE 55: CPT | Performed by: CLINICAL NURSE SPECIALIST

## 2023-10-16 PROCEDURE — 85652 RBC SED RATE AUTOMATED: CPT | Performed by: STUDENT IN AN ORGANIZED HEALTH CARE EDUCATION/TRAINING PROGRAM

## 2023-10-16 PROCEDURE — A9577 INJ MULTIHANCE: HCPCS | Performed by: STUDENT IN AN ORGANIZED HEALTH CARE EDUCATION/TRAINING PROGRAM

## 2023-10-16 PROCEDURE — 80050 GENERAL HEALTH PANEL: CPT | Performed by: STUDENT IN AN ORGANIZED HEALTH CARE EDUCATION/TRAINING PROGRAM

## 2023-10-16 RX ORDER — ACETAMINOPHEN 650 MG/1
650 SUPPOSITORY RECTAL EVERY 4 HOURS PRN
Status: DISCONTINUED | OUTPATIENT
Start: 2023-10-16 | End: 2023-10-20 | Stop reason: HOSPADM

## 2023-10-16 RX ORDER — SUMATRIPTAN 50 MG/1
50 TABLET, FILM COATED ORAL ONCE AS NEEDED
Status: COMPLETED | OUTPATIENT
Start: 2023-10-16 | End: 2023-10-17

## 2023-10-16 RX ORDER — METHOCARBAMOL 500 MG/1
500 TABLET, FILM COATED ORAL DAILY PRN
Status: DISCONTINUED | OUTPATIENT
Start: 2023-10-16 | End: 2023-10-20 | Stop reason: HOSPADM

## 2023-10-16 RX ORDER — SODIUM CHLORIDE 0.9 % (FLUSH) 0.9 %
10 SYRINGE (ML) INJECTION EVERY 12 HOURS SCHEDULED
Status: DISCONTINUED | OUTPATIENT
Start: 2023-10-16 | End: 2023-10-20 | Stop reason: HOSPADM

## 2023-10-16 RX ORDER — SODIUM CHLORIDE 0.9 % (FLUSH) 0.9 %
10 SYRINGE (ML) INJECTION AS NEEDED
Status: DISCONTINUED | OUTPATIENT
Start: 2023-10-16 | End: 2023-10-20 | Stop reason: HOSPADM

## 2023-10-16 RX ORDER — L.ACID,PARA/B.BIFIDUM/S.THERM 8B CELL
1 CAPSULE ORAL DAILY
Status: DISCONTINUED | OUTPATIENT
Start: 2023-10-16 | End: 2023-10-20 | Stop reason: HOSPADM

## 2023-10-16 RX ORDER — UREA 10 %
27 LOTION (ML) TOPICAL 2 TIMES DAILY
Status: DISCONTINUED | OUTPATIENT
Start: 2023-10-16 | End: 2023-10-20 | Stop reason: HOSPADM

## 2023-10-16 RX ORDER — NITROGLYCERIN 0.4 MG/1
0.4 TABLET SUBLINGUAL
Status: DISCONTINUED | OUTPATIENT
Start: 2023-10-16 | End: 2023-10-20 | Stop reason: HOSPADM

## 2023-10-16 RX ORDER — CALCIUM CARBONATE 500 MG/1
1 TABLET, CHEWABLE ORAL 2 TIMES DAILY PRN
Status: DISCONTINUED | OUTPATIENT
Start: 2023-10-16 | End: 2023-10-20 | Stop reason: HOSPADM

## 2023-10-16 RX ORDER — L.ACID,CASEI/B.ANIMAL/S.THERMO 16B CELL
1 CAPSULE ORAL DAILY
COMMUNITY

## 2023-10-16 RX ORDER — ACETAMINOPHEN 325 MG/1
650 TABLET ORAL EVERY 4 HOURS PRN
Status: DISCONTINUED | OUTPATIENT
Start: 2023-10-16 | End: 2023-10-20 | Stop reason: HOSPADM

## 2023-10-16 RX ORDER — INSULIN LISPRO 100 [IU]/ML
3-14 INJECTION, SOLUTION INTRAVENOUS; SUBCUTANEOUS
Status: DISCONTINUED | OUTPATIENT
Start: 2023-10-16 | End: 2023-10-20 | Stop reason: HOSPADM

## 2023-10-16 RX ORDER — BISACODYL 5 MG/1
5 TABLET, DELAYED RELEASE ORAL DAILY PRN
Status: DISCONTINUED | OUTPATIENT
Start: 2023-10-16 | End: 2023-10-20 | Stop reason: HOSPADM

## 2023-10-16 RX ORDER — BISACODYL 10 MG
10 SUPPOSITORY, RECTAL RECTAL DAILY PRN
Status: DISCONTINUED | OUTPATIENT
Start: 2023-10-16 | End: 2023-10-20 | Stop reason: HOSPADM

## 2023-10-16 RX ORDER — SERTRALINE HYDROCHLORIDE 100 MG/1
100 TABLET, FILM COATED ORAL DAILY
Status: DISCONTINUED | OUTPATIENT
Start: 2023-10-16 | End: 2023-10-20 | Stop reason: HOSPADM

## 2023-10-16 RX ORDER — DEXTROSE MONOHYDRATE 25 G/50ML
25 INJECTION, SOLUTION INTRAVENOUS
Status: DISCONTINUED | OUTPATIENT
Start: 2023-10-16 | End: 2023-10-20 | Stop reason: HOSPADM

## 2023-10-16 RX ORDER — ONDANSETRON 4 MG/1
4 TABLET, FILM COATED ORAL EVERY 6 HOURS PRN
Status: DISCONTINUED | OUTPATIENT
Start: 2023-10-16 | End: 2023-10-20 | Stop reason: HOSPADM

## 2023-10-16 RX ORDER — ONDANSETRON 2 MG/ML
4 INJECTION INTRAMUSCULAR; INTRAVENOUS EVERY 6 HOURS PRN
Status: DISCONTINUED | OUTPATIENT
Start: 2023-10-16 | End: 2023-10-20 | Stop reason: HOSPADM

## 2023-10-16 RX ORDER — LEVOTHYROXINE SODIUM 175 UG/1
175 TABLET ORAL
COMMUNITY

## 2023-10-16 RX ORDER — POLYETHYLENE GLYCOL 3350 17 G/17G
17 POWDER, FOR SOLUTION ORAL DAILY PRN
Status: DISCONTINUED | OUTPATIENT
Start: 2023-10-16 | End: 2023-10-20 | Stop reason: HOSPADM

## 2023-10-16 RX ORDER — HYDROCODONE BITARTRATE AND ACETAMINOPHEN 7.5; 325 MG/1; MG/1
1 TABLET ORAL EVERY 6 HOURS PRN
Status: DISCONTINUED | OUTPATIENT
Start: 2023-10-16 | End: 2023-10-20 | Stop reason: HOSPADM

## 2023-10-16 RX ORDER — AMOXICILLIN 250 MG
2 CAPSULE ORAL 2 TIMES DAILY
Status: DISCONTINUED | OUTPATIENT
Start: 2023-10-16 | End: 2023-10-20 | Stop reason: HOSPADM

## 2023-10-16 RX ORDER — MONTELUKAST SODIUM 10 MG/1
10 TABLET ORAL NIGHTLY
Status: DISCONTINUED | OUTPATIENT
Start: 2023-10-16 | End: 2023-10-20 | Stop reason: HOSPADM

## 2023-10-16 RX ORDER — NEOMYCIN SULFATE, POLYMYXIN B SULFATE, BACITRACIN ZINC, HYDROCORTISONE 3.5; 10000; 400; 1 MG/G; [USP'U]/G; [USP'U]/G; MG/G
1 OINTMENT OPHTHALMIC 2 TIMES DAILY
COMMUNITY
End: 2023-10-25

## 2023-10-16 RX ORDER — ACETAMINOPHEN 160 MG/5ML
650 SOLUTION ORAL EVERY 4 HOURS PRN
Status: DISCONTINUED | OUTPATIENT
Start: 2023-10-16 | End: 2023-10-20 | Stop reason: HOSPADM

## 2023-10-16 RX ORDER — GLIPIZIDE 10 MG/1
10 TABLET, FILM COATED, EXTENDED RELEASE ORAL DAILY
COMMUNITY

## 2023-10-16 RX ORDER — CHOLECALCIFEROL (VITAMIN D3) 125 MCG
5 CAPSULE ORAL NIGHTLY PRN
Status: DISCONTINUED | OUTPATIENT
Start: 2023-10-16 | End: 2023-10-20 | Stop reason: HOSPADM

## 2023-10-16 RX ORDER — SODIUM CHLORIDE 9 MG/ML
40 INJECTION, SOLUTION INTRAVENOUS AS NEEDED
Status: DISCONTINUED | OUTPATIENT
Start: 2023-10-16 | End: 2023-10-20 | Stop reason: HOSPADM

## 2023-10-16 RX ORDER — NICOTINE POLACRILEX 4 MG
15 LOZENGE BUCCAL
Status: DISCONTINUED | OUTPATIENT
Start: 2023-10-16 | End: 2023-10-20 | Stop reason: HOSPADM

## 2023-10-16 RX ORDER — CHOLECALCIFEROL (VITAMIN D3) 125 MCG
1000 CAPSULE ORAL DAILY
Status: DISCONTINUED | OUTPATIENT
Start: 2023-10-17 | End: 2023-10-20 | Stop reason: HOSPADM

## 2023-10-16 RX ADMIN — INSULIN LISPRO 5 UNITS: 100 INJECTION, SOLUTION INTRAVENOUS; SUBCUTANEOUS at 16:36

## 2023-10-16 RX ADMIN — INSULIN LISPRO 3 UNITS: 100 INJECTION, SOLUTION INTRAVENOUS; SUBCUTANEOUS at 20:40

## 2023-10-16 RX ADMIN — PANCRELIPASE 12000 UNITS OF LIPASE: 60000; 12000; 38000 CAPSULE, DELAYED RELEASE PELLETS ORAL at 17:25

## 2023-10-16 RX ADMIN — Medication 27 MG: at 21:43

## 2023-10-16 RX ADMIN — Medication 10 ML: at 20:42

## 2023-10-16 RX ADMIN — METHOCARBAMOL 500 MG: 500 TABLET, FILM COATED ORAL at 21:43

## 2023-10-16 RX ADMIN — GADOBENATE DIMEGLUMINE 17 ML: 529 INJECTION, SOLUTION INTRAVENOUS at 18:47

## 2023-10-16 RX ADMIN — Medication 1 CAPSULE: at 16:04

## 2023-10-16 RX ADMIN — MONTELUKAST 10 MG: 10 TABLET, FILM COATED ORAL at 20:40

## 2023-10-16 RX ADMIN — INSULIN DETEMIR 10 UNITS: 100 INJECTION, SOLUTION SUBCUTANEOUS at 20:40

## 2023-10-16 RX ADMIN — Medication 5000 UNITS: at 16:05

## 2023-10-16 RX ADMIN — SERTRALINE HYDROCHLORIDE 100 MG: 100 TABLET, FILM COATED ORAL at 16:05

## 2023-10-16 RX ADMIN — Medication 5 MG: at 23:09

## 2023-10-16 RX ADMIN — ACETAMINOPHEN 650 MG: 325 TABLET, FILM COATED ORAL at 23:09

## 2023-10-16 RX ADMIN — DOCUSATE SODIUM 50 MG AND SENNOSIDES 8.6 MG 2 TABLET: 8.6; 5 TABLET, FILM COATED ORAL at 20:40

## 2023-10-16 NOTE — CONSULTS
Neurology Consult Note    Consult Date: 10/16/2023  Referring MD: Elfego Overton MD  Reason for Consult: right lower extremity paresthesia    Patient: Charlene Bey (67 y.o. female)  MRN: [id number]  : 1955    History of Present Illness:   Charlene Bey is a 67 y.o. female with PMH DM, hashimoto thyroiditis, pancreatitis, peptic ulcer.  She has been having vertigo symptoms for about 1 month and has been doing vestibular rehab. Patient is right handed. She is retired  from assisted living. History obtained by patient and  who is at the bedside. Patient developed Covid symptoms about 5 days ago with fatigue, runny nose and cough. She did home Covid test that was negative and started Paxlovid. She tells me symptoms improved. She has been afebrile. She denies recent falls. This AM she woke in usual state and walked to the kitchen to make coffee. Upon return to the table she noted her right foot felt numb. The numbness progressed to the entire right side to upper abdomen. She also noted right lower extremity weakness and had difficulty raising right leg. She also notes pain that begins in abdomen and radiates to her back similar to prior pancreatitis attacks.  She denies vision changes, speech changes or involvement of RUE. She decided to get checked out and she needed assistance to the car and her  transported her to [facility]. CT head was negative, CT abd/pelvis showed large hiatal hernia, moderate amount of stool, fat containing umbilical and supra umbilical hernia, left and right ingquinal hernias. amylase/lipase WNL. TSH 9.92. Dr. JOSE Overton discussed case with Dr. ELLE Lynn and patient accepted for transfer.       Medical History:   Past Medical/Surgical Hx:  Past Medical History:   Diagnosis Date    Cystocele with rectocele     Diabetes mellitus     Goiter     Hashimoto's thyroiditis     Headache     Hypothyroidism     Mumps pancreatitis     Pancreatitis      Peptic ulceration     Thyroid nodule      Past Surgical History:   Procedure Laterality Date    BLADDER SUSPENSION      CHOLECYSTECTOMY      COLOSTOMY      CYSTOCELE REPAIR      HERNIA REPAIR      HYSTERECTOMY      OVARIAN CYST REMOVAL      RECTOCELE REPAIR      SMALL INTESTINE SURGERY      TONSILLECTOMY         Medications On Admission:  Medications Prior to Admission   Medication Sig Dispense Refill Last Dose    CALCIUM-MAGNESIUM-ZINC PO Take 1 tablet by mouth Daily.       Coenzyme Q10 (CoQ10) 100 MG capsule Take 3 capsules by mouth Daily.       Cyanocobalamin (B-12) 1000 MCG tablet Take 1 tablet by mouth Daily.       glipizide (GLUCOTROL XL) 10 MG 24 hr tablet Take 1 tablet by mouth Daily.       glucosamine-chondroitin 500-400 MG capsule capsule Take 1 capsule by mouth 3 (Three) Times a Day With Meals.       insulin detemir (Levemir FlexPen) 100 UNIT/ML injection Inject 8 Units under the skin into the appropriate area as directed Daily. 3 mL 2     levothyroxine (SYNTHROID, LEVOTHROID) 150 MCG tablet Take 1 tablet by mouth Daily. Am, tues/thurs/sa/su       levothyroxine (SYNTHROID, LEVOTHROID) 175 MCG tablet Take 1 tablet by mouth Every Morning. Monday, Wednesday and Friday       magnesium gluconate (MAGONATE) 500 MG tablet Take 1 tablet by mouth 2 (Two) Times a Day.       montelukast (SINGULAIR) 10 MG tablet Take 1 tablet by mouth Daily.       neomycin-bacitracin-polymyxin-hydrocortisone (CORTISPORIN) 1 % ophthalmic ointment Administer 1 application  to both eyes 2 (Two) Times a Day.       Omega-3 Fatty Acids (fish oil) 1000 MG capsule capsule Take 1 capsule by mouth Daily With Breakfast.       Pancrelipase, Lip-Prot-Amyl, (CREON) 38261-621120 units capsule delayed-release particles capsule Take 1 capsule by mouth 3 (Three) Times a Day With Meals.       Probiotic Product (Risaquad-2) capsule capsule Take 1 capsule by mouth Daily.       sertraline (ZOLOFT) 100 MG tablet Take 1 tablet by mouth Daily.       vitamin  D3 125 MCG (5000 UT) capsule capsule Take 1 capsule by mouth Daily.       estradiol (ESTRACE) 0.1 MG/GM vaginal cream Insert 2 g into the vagina Daily As Needed. 2-3 times wk       hydrOXYzine (ATARAX) 25 MG tablet Take 1 tablet by mouth Daily As Needed (Dizziness). 90 tablet 0     methocarbamol (ROBAXIN) 500 MG tablet Take 1 tablet by mouth Daily As Needed for Muscle Spasms. 90 tablet 0     ondansetron (ZOFRAN) 4 MG tablet Take 1 tablet by mouth Every 8 (Eight) Hours As Needed for Nausea or Vomiting. 30 tablet 2     rizatriptan (MAXALT) 10 MG tablet Take 1 tablet by mouth 1 (One) Time As Needed.          Current Medications:    Current Facility-Administered Medications:     acetaminophen (TYLENOL) tablet 650 mg, 650 mg, Oral, Q4H PRN **OR** acetaminophen (TYLENOL) 160 MG/5ML oral solution 650 mg, 650 mg, Oral, Q4H PRN **OR** acetaminophen (TYLENOL) suppository 650 mg, 650 mg, Rectal, Q4H PRN, Jose Avery MD    sennosides-docusate (PERICOLACE) 8.6-50 MG per tablet 2 tablet, 2 tablet, Oral, BID **AND** polyethylene glycol (MIRALAX) packet 17 g, 17 g, Oral, Daily PRN **AND** bisacodyl (DULCOLAX) EC tablet 5 mg, 5 mg, Oral, Daily PRN **AND** bisacodyl (DULCOLAX) suppository 10 mg, 10 mg, Rectal, Daily PRN, Jose Avery MD    calcium carbonate (TUMS) chewable tablet 500 mg (200 mg elemental), 1 tablet, Oral, BID PRN, Jose Avery MD    Calcium Replacement - Follow Nurse / BPA Driven Protocol, , Does not apply, PRN, Jose Avery MD    dextrose (D50W) (25 g/50 mL) IV injection 25 g, 25 g, Intravenous, Q15 Min PRN, Jose Avery MD    dextrose (GLUTOSE) oral gel 15 g, 15 g, Oral, Q15 Min PRN, Jose Avery MD    glucagon (GLUCAGEN) injection 1 mg, 1 mg, Intramuscular, Q15 Min PRN, Jose Avery MD    HYDROcodone-acetaminophen (NORCO) 7.5-325 MG per tablet 1 tablet, 1 tablet, Oral, Q6H PRN, Jose Avery MD    insulin detemir (LEVEMIR) injection 10 Units, 10 Units,  Subcutaneous, Nightly, Jose Avery MD    Insulin Lispro (humaLOG) injection 3-14 Units, 3-14 Units, Subcutaneous, 4x Daily AC & at Bedtime, Jose Avery MD    lactobacillus acidophilus (RISAQUAD) capsule 1 capsule, 1 capsule, Oral, Daily, Jose Avery MD    [START ON 10/17/2023] levothyroxine (SYNTHROID, LEVOTHROID) tablet 175 mcg, 175 mcg, Oral, Q AM, Jose Avery MD    magnesium (as) gluconate (MAGONATE) tablet 27 mg, 27 mg, Oral, BID, Jose Avery MD    Magnesium Standard Dose Replacement - Follow Nurse / BPA Driven Protocol, , Does not apply, PRN, Jose Avery MD    melatonin tablet 5 mg, 5 mg, Oral, Nightly PRN, Jose Avery MD    methocarbamol (ROBAXIN) tablet 500 mg, 500 mg, Oral, Daily PRN, Jose Avery MD    montelukast (SINGULAIR) tablet 10 mg, 10 mg, Oral, Nightly, Jose Avery MD    nitroglycerin (NITROSTAT) SL tablet 0.4 mg, 0.4 mg, Sublingual, Q5 Min PRN, Jose Avery MD    ondansetron (ZOFRAN) tablet 4 mg, 4 mg, Oral, Q6H PRN **OR** ondansetron (ZOFRAN) injection 4 mg, 4 mg, Intravenous, Q6H PRN, Jose Avery MD    pancrelipase (Lip-Prot-Amyl) (CREON) capsule 12,000 units of lipase, 12,000 units of lipase, Oral, TID With Meals, Jose Avery MD    Phosphorus Replacement - Follow Nurse / BPA Driven Protocol, , Does not apply, PRN, Jose Avery MD    Potassium Replacement - Follow Nurse / BPA Driven Protocol, , Does not apply, PRN, Jose Avery MD    sertraline (ZOLOFT) tablet 100 mg, 100 mg, Oral, Daily, Jose Avery MD    sodium chloride 0.9 % flush 10 mL, 10 mL, Intravenous, Q12H, Jose Avery MD    sodium chloride 0.9 % flush 10 mL, 10 mL, Intravenous, PRN, Jose Avery MD    sodium chloride 0.9 % infusion 40 mL, 40 mL, Intravenous, PRN, Jose Avery MD    SUMAtriptan (IMITREX) tablet 50 mg, 50 mg, Oral, Once PRN, Jose Avery MD    [START ON 10/17/2023] vitamin B-12  "(CYANOCOBALAMIN) tablet 1,000 mcg, 1,000 mcg, Oral, Daily, Jose Avery MD    vitamin D3 capsule 5,000 Units, 5,000 Units, Oral, Daily, Jose Avery MD     Allergies:  Allergies   Allergen Reactions    Compazine [Prochlorperazine] Seizure    Erythromycin Itching and Other (See Comments)     Balance issues      Ultram [Tramadol] Other (See Comments)     faints    Adhesive Tape Rash       Social Hx:  Social History     Socioeconomic History    Marital status:    Tobacco Use    Smoking status: Never    Smokeless tobacco: Never   Vaping Use    Vaping Use: Never used   Substance and Sexual Activity    Alcohol use: Never    Drug use: Never       Family Hx:  Family History   Problem Relation Age of Onset    Cancer Mother     Stroke Father     Hypertension Father      Physical Examination:   Vital Signs:  Vitals:    10/16/23 1247   BP: 124/59   BP Location: Right arm   Patient Position: Lying   Pulse: 56   Resp: 18   Temp: 98.6 °F (37 °C)   TempSrc: Oral   SpO2: 99%   Weight: 87.5 kg (193 lb)   Height: 170.2 cm (67\")       General Exam:  Head:  Normocephalic, atraumatic  HEENT:  Neck supple  Fundoscopic Exam:  No signs of disc edema  CVS:  Regular rate and rhythm.  No murmurs  Carotid Examination:  No bruits  Lungs:  Clear to auscultation  Abdomen:  Nontender, Nondistended  Extremities:  No signs of peripheral edema  Skin:  No rashes    Neurologic Exam:    Mental Status:    -Awake, Alert, Oriented X 3  -No word finding difficulties  -No aphasia  -No dysarthria  -Follows simple and complex commands    CN II:  Visual fields full.  Pupils equally reactive to light  CN III, IV, VI:  Extraocular Muscles full with no signs of nystagmus  CN V:  Facial sensory is symmetric with no asymmetries.  CN VII:  Facial motor asymmetric with slight lag right upper lip.   CN VIII:  Gross hearing intact bilaterally  CN IX:  Palate elevates symmetrically  CN X:  Palate elevates symmetrically  CN XI:  Shoulder shrug " "symmetric  CN XII:  Tongue is midline on protrusion    Motor: (strength out of 5:  1= minimal movement, 2 = movement in plane of gravity, 3 = movement against gravity, 4 = movement against some resistance, 5 = full strength)    -Right Upper Ext: Proximal: 5 Distal: 5  -Left Upper Ext: Proximal: 5 Distal: 5    -Right Lower Ext: unable to hold against gravity and immedieately falls to the bed. 1/5 plantar and dorsiflexion  -Left Lower Ext: Proximal: 5 Distal: 5    DTR:  -Right   Biceps: 2+ Triceps: 2+ Brachioradialis: 2+   Patella: 2+ Ankle: 1+ Neg Babinski  -Left   Biceps: 2+ Triceps: 2+ Brachioradialis: 2+   Patella: 2+ Ankle: 2+ Neg Babinski    Sensory:  -decrease/altered sensation to light touch, pinprick, temperature, on right about T7 anterior and posterior on right side to midline and right lower extremity.     Coordination:  -Finger to nose intact  -Heel to shin intact on left, unable to perform on right.   -No ataxia    Gait  -not tested for safety reasons.     Recent Diagnostics:   Laboratory Results:   - Reviewed in EMR  Lab Results   Component Value Date    GLUCOSE 151 (H) 07/25/2023    CALCIUM 10.2 07/25/2023     07/25/2023    K 4.2 07/25/2023    CO2 22 07/25/2023     07/25/2023    BUN 15 07/25/2023    CREATININE 0.78 07/25/2023    BCR 19 07/25/2023     Lab Results   Component Value Date    WBC 9.4 07/25/2023    HGB 14.1 07/25/2023    HCT 42.0 07/25/2023    MCV 81 07/25/2023     07/25/2023     No results found for: \"PTT\", \"INR\"  No results found for: \"CHOLTOT\", \"TRIG\", \"HDL\", \"LDL\"  Lab Results   Component Value Date    HGBA1C 8.1 (H) 07/25/2023       Imaging Results:  Imaging Results (Last 24 Hours)       ** No results found for the last 24 hours. **             Assessment & Plan:   Patient PMH DM, hashimoto thyroiditis, pancreatitis, peptic ulcer.  She has been having vertigo symptoms for about 1 month and has been doing vestibular rehab. Patient is right handed. She is retired "  from assisted living. History obtained by patient and  who is at the bedside. Patient developed Covid symptoms about 5 days ago with fatigue, runny nose and cough. She did home Covid test that was negative and started Paxlovid. She tells me symptoms improved. She has been afebrile. She denies recent falls. This AM she woke in usual state and walked to the kitchen to make coffee. Upon return to the table she noted her right foot felt numb. The numbness progressed to the entire right side to upper abdomen. She also noted right lower extremity weakness and had difficulty raising right leg. She also notes pain that begins in abdomen and radiates to her back similar to prior pancreatitis attacks.  She denies vision changes, speech changes or involvement of RUE. She decided to get checked out and she needed assistance to the car and her  transported her to [facility]. CT head was negative, amylase/lipase WNL.    Impression:  Acute onset of right lower extremity paresthesia that radiated to right trunk to about T7 with RLE weakness.  Covid + on 10/11/2023  DM  Thyroid disorder        Plan:   Differential diagnosis is broad at this point but would like to rule out transverse myelitis, also consider Guillain- Wise. Will get MRI TS with and without contrast and MRI LS with and without contrast.  Check A1C, Lipid panel, B12, foalte, Mg+, TSH, T4CRP, Sed rate  OT/PT.  Will consider MRI brain after #1.     Sherlyn Fernandes, APRN  10/16/23  15:52 CDT    Medical Decision Making    Number/Complexity of Problems  Moderate  1 undiagnosed new problem with uncertain prognosis -   1 acute illness with systemic symptoms -   High  1 acute or chronic illness that poses a threat to life/body function -   High  Acute onset of RLE weakness and paresthesia.     MDM Data  Moderate - 1/3 categories  Extensive - 2/3 categories    Category 1: 3 of the following  Review of external notes  Review of  results  Ordering of each unique test  Independent historian  Category 2:  Independent interpretation of test (ex: imaging)  Category 3:  Discussion of management with another provider    Extensive, I have personally reviewed records from OSH and obtained HPI     Treatment Plan  Moderate - Prescription Drug management  High  Drug therapy requiring intensive monitoring for toxicity  Decision regarding hospitalization or escalation of care  De-escalate care/DNR decisions  high

## 2023-10-16 NOTE — PLAN OF CARE
Goal Outcome Evaluation:  Plan of Care Reviewed With: patient, spouse        Progress: improving  Outcome Evaluation: Pt. A&Ox4, VSS, received from EMS this shift and telemetry placed, contact precautions initiated, COVID test negative, MD made aware, MRI scheduled, Pt with unsteady gait, RLE weakness, ambulates x2 to bathroom, safety maintained, plan of care ongoing.

## 2023-10-16 NOTE — NURSING NOTE
Received pt. as direct admit from [facility].  at bedside, pt. changed into gown and ambulated to bathroom x 2 assist. RLE weak and numb and struggles to lift leg well for ambulation. Walker brought to room. Awaiting orders.

## 2023-10-16 NOTE — H&P
[facility] Medicine Services  HISTORY AND PHYSICAL    Date of Admission: 10/16/2023  Primary Care Physician: Alex Saravia APRN    Subjective   Primary Historian: Patient     Chief Complaint: right leg numbness    History of Present Illness  Patient is a 67-year-old female with history of hypothyroidism, type 2 diabetes, chronic pancreatitis, migraine headaches, dizziness who was transferred from [facility] ER due to acute right lower extremity numbness and tingling sensation affecting her gait.  Patient did have a CT of the head completed at the facility that did not show any acute process.  CT abdomen pelvis from outside facility showed hepatic steatosis, moderately large hiatal hernia, colonic diverticulosis, umbilical supraumbilical hernia.  In-house neurologist Dr. Lynn was notified about this patient before transferred who recommends admitting patient to the hospitalist team and consulting neurology.    Patient was seen when she got to the floor patient states she developed right lower extremity numbness and tingling sensation that started this morning.  She states the paresthesias seem to be moving upwards only on the right side.  Patient denies respiratory distress.        Review of Systems   Otherwise complete ROS reviewed and negative except as mentioned in the HPI.    Past Medical History:   Past Medical History:   Diagnosis Date    Cystocele with rectocele     Diabetes mellitus     Goiter     Hashimoto's thyroiditis     Headache     Hypothyroidism     Mumps pancreatitis     Pancreatitis     Peptic ulceration     Thyroid nodule      Past Surgical History:  Past Surgical History:   Procedure Laterality Date    BLADDER SUSPENSION      CHOLECYSTECTOMY      COLOSTOMY      CYSTOCELE REPAIR      HERNIA REPAIR      HYSTERECTOMY      OVARIAN CYST REMOVAL      RECTOCELE REPAIR      SMALL INTESTINE SURGERY      TONSILLECTOMY       Social History:  reports that she  has never smoked. She has never used smokeless tobacco. She reports that she does not drink alcohol and does not use drugs.    Family History: family history includes Cancer in her mother; Hypertension in her father; Stroke in her father.       Allergies:  Allergies   Allergen Reactions    Compazine [Prochlorperazine] Seizure    Erythromycin Itching and Other (See Comments)     Balance issues      Ultram [Tramadol] Other (See Comments)     faints    Adhesive Tape Rash       Medications:  Prior to Admission medications    Medication Sig Start Date End Date Taking? Authorizing Provider   CALCIUM-MAGNESIUM-ZINC PO Take 1 tablet by mouth Daily.   Yes Anamaria Kenyon MD   Coenzyme Q10 (CoQ10) 100 MG capsule Take 3 capsules by mouth Daily.   Yes Anamaria Kenyon MD   Cyanocobalamin (B-12) 1000 MCG tablet Take 1 tablet by mouth Daily.   Yes Anamaria Kenyon MD   glipizide (GLUCOTROL XL) 10 MG 24 hr tablet Take 1 tablet by mouth Daily.   Yes Anamaria Kenyon MD   glucosamine-chondroitin 500-400 MG capsule capsule Take 1 capsule by mouth 3 (Three) Times a Day With Meals.   Yes Anamaria Kenyon MD   insulin detemir (Levemir FlexPen) 100 UNIT/ML injection Inject 8 Units under the skin into the appropriate area as directed Daily. 7/25/23  Yes Alex Saravia APRN   levothyroxine (SYNTHROID, LEVOTHROID) 150 MCG tablet Take 1 tablet by mouth Daily. Am, tues/thurs/sa/land 5/10/23  Yes Anamaria Kenyon MD   levothyroxine (SYNTHROID, LEVOTHROID) 175 MCG tablet Take 1 tablet by mouth Every Morning. Monday, Wednesday and Friday   Yes Anamaria Kenyon MD   magnesium gluconate (MAGONATE) 500 MG tablet Take 1 tablet by mouth 2 (Two) Times a Day.   Yes Anamaria Kenyon MD   montelukast (SINGULAIR) 10 MG tablet Take 1 tablet by mouth Daily. 6/14/23  Yes Anamaria Kenyon MD   neomycin-bacitracin-polymyxin-hydrocortisone (CORTISPORIN) 1 % ophthalmic ointment Administer 1 application  to both eyes  2 (Two) Times a Day.   Yes Anamaria Kenyon MD   Omega-3 Fatty Acids (fish oil) 1000 MG capsule capsule Take 1 capsule by mouth Daily With Breakfast.   Yes Anamaria Kenyon MD   Pancrelipase, Lip-Prot-Amyl, (CREON) 79485-990235 units capsule delayed-release particles capsule Take 1 capsule by mouth 3 (Three) Times a Day With Meals.   Yes Anamaria Kenyon MD   Probiotic Product (Risaquad-2) capsule capsule Take 1 capsule by mouth Daily.   Yes Anamaria Kenyon MD   sertraline (ZOLOFT) 100 MG tablet Take 1 tablet by mouth Daily. 7/12/23  Yes Anamaria Kenyon MD   vitamin D3 125 MCG (5000 UT) capsule capsule Take 1 capsule by mouth Daily.   Yes Anamaria Kenyon MD   omeprazole (priLOSEC) 40 MG capsule Take 1 capsule by mouth Daily.  10/16/23 Yes Anamaria Kenyon MD   estradiol (ESTRACE) 0.1 MG/GM vaginal cream Insert 2 g into the vagina Daily As Needed. 2-3 times wk    Anamaria Kenyon MD   hydrOXYzine (ATARAX) 25 MG tablet Take 1 tablet by mouth Daily As Needed (Dizziness). 8/23/23   Alex Saravia APRN   methocarbamol (ROBAXIN) 500 MG tablet Take 1 tablet by mouth Daily As Needed for Muscle Spasms. 8/23/23   Alex Saravia APRN   ondansetron (ZOFRAN) 4 MG tablet Take 1 tablet by mouth Every 8 (Eight) Hours As Needed for Nausea or Vomiting. 8/23/23   Alex Saravia APRN   rizatriptan (MAXALT) 10 MG tablet Take 1 tablet by mouth 1 (One) Time As Needed. 3/30/23   Anamaria Kenyon MD   albuterol (PROVENTIL) 2.5 MG/0.5ML nebulizer solution Take 2.5 mg by nebulization Every 8 (Eight) Hours As Needed for Wheezing or Shortness of Air (for 30 days).  10/16/23  Anamaria Kenyon MD   albuterol sulfate  (90 Base) MCG/ACT inhaler Inhale 2 puffs Every 4 (Four) Hours As Needed for Wheezing.  10/16/23  Anamaria Kenyon MD   brompheniramine-pseudoephedrine-DM 30-2-10 MG/5ML syrup Take 5 mL by mouth Every 6 (Six) Hours As Needed for Congestion or Cough.  Patient not  "taking: Reported on 10/16/2023 10/11/23 10/16/23  Alex Saravia APRN   cephalexin (Keflex) 500 MG capsule Take 1 capsule by mouth 2 (Two) Times a Day.  Patient not taking: Reported on 10/16/2023 9/22/23 10/16/23  Digna Dominguez APRN   clonazePAM (KlonoPIN) 0.5 MG tablet Take 1 tablet by mouth At Night As Needed.  Patient not taking: Reported on 10/16/2023 3/30/23 10/16/23  Anamaria Kenyon MD   glipizide (GLUCOTROL) 10 MG tablet Take 1 tablet by mouth 2 (Two) Times a Day Before Meals. 6/14/23 10/16/23  Anamaria Kenyon MD   Insulin Pen Needle (Pen Needles) 31G X 8 MM misc 1 each Daily. 7/25/23 10/16/23  Alex Saravia APRN   levothyroxine (SYNTHROID, LEVOTHROID) 175 MCG tablet 1 TABLET IN THE MORNING ON AN EMPTY STOMACH ORALLY MONDAY, WEDNESDAY, FRIDAY 90 DAYS 9/13/23 10/16/23  Alex Saravia APRN   neomycin-bacitracin-polymyxin-hydrocortisone (CORTISPORIN) 1 % ophthalmic ointment Administer  to both eyes 2 (Two) Times a Day. 9/22/23 10/16/23  Digna Dominguez APRN   Nirmatrelvir&Ritonavir 300/100 (PAXLOVID) 20 x 150 MG & 10 x 100MG tablet therapy pack tablet Take 3 tablets by mouth 2 (Two) Times a Day for 5 days.  Patient not taking: Reported on 10/16/2023 10/11/23 10/16/23  Alex Saravia APRN   Probiotic Product (PROBIOTIC DAILY PO) Take  by mouth.  10/16/23  Anamaria Kenyon MD     I have utilized all available immediate resources to obtain, update, or review the patient's current medications (including all prescriptions, over-the-counter products, herbals, cannabis/cannabidiol products, and vitamin/mineral/dietary (nutritional) supplements).    Objective     Vital Signs: /59 (BP Location: Right arm, Patient Position: Lying)   Pulse 56   Temp 98.6 °F (37 °C) (Oral)   Resp 18   Ht 170.2 cm (67\")   Wt 87.5 kg (193 lb)   SpO2 99%   BMI 30.23 kg/m²   Physical Exam  Vitals and nursing note reviewed.   Constitutional:       General: She is not in acute distress.     " Appearance: Normal appearance. She is not diaphoretic.   HENT:      Head: Normocephalic and atraumatic.      Right Ear: External ear normal.      Left Ear: External ear normal.   Eyes:      General: No scleral icterus.     Extraocular Movements: Extraocular movements intact.      Conjunctiva/sclera: Conjunctivae normal.   Cardiovascular:      Rate and Rhythm: Normal rate and regular rhythm.      Heart sounds: Normal heart sounds.   Pulmonary:      Effort: Pulmonary effort is normal. No respiratory distress.      Breath sounds: Normal breath sounds.   Chest:      Chest wall: No tenderness.   Abdominal:      General: Bowel sounds are normal.      Palpations: Abdomen is soft.      Tenderness: There is no abdominal tenderness.   Musculoskeletal:         General: No swelling or tenderness.      Cervical back: Normal range of motion and neck supple.      Right lower leg: No edema.      Left lower leg: No edema.   Skin:     General: Skin is warm and dry.      Capillary Refill: Capillary refill takes less than 2 seconds.      Findings: No erythema or rash.   Neurological:      General: No focal deficit present.      Mental Status: She is alert and oriented to person, place, and time.      Cranial Nerves: No cranial nerve deficit.      Sensory: Sensory deficit (right LE) present.      Motor: Weakness (3/5 MS of right LE) present.      Coordination: Coordination normal.      Gait: Gait abnormal.   Psychiatric:         Behavior: Behavior normal.              Results Reviewed:  Lab Results (last 24 hours)       ** No results found for the last 24 hours. **          Imaging Results (Last 24 Hours)       ** No results found for the last 24 hours. **          I have personally reviewed and interpreted the radiology studies and ECG obtained at time of admission.     Assessment / Plan   Assessment:   Active Hospital Problems    Diagnosis     **Paresthesia and pain of right extremity     Type 2 diabetes mellitus with hyperglycemia      Hypothyroidism (acquired)     Chronic pancreatitis     Nonintractable migraine     Dizziness, nonspecific      Patient is a 67-year-old female with history of hypothyroidism, type 2 diabetes, chronic pancreatitis, migraine headaches, dizziness who was transferred from [facility] due to acute right lower extremity numbness and tingling sensation affecting her gait.  Patient was recently treated for COVID-19 infection with Paxlovid.  Given that patient does have a history of chronic dizziness and migraine.  This could be presentation of complex migraine versus other etiologies such as uncontrolled diabetes, uncontrolled thyroid disease, electrolyte abnormalities.     Treatment Plan  The patient will be admitted to my service here at [facility].     Paresthesia of the right lower extremity: Etiology unclear.  Differential diagnosis complex migraine versus transverse myelitis versus Guillain-Barré versus diabetic versus endocrinology.    Admit patient to the floor  Obtain TSH, vitamin B12, folate, CRP and sed rate.  Neurology consulted appreciate recommendations and management  Neurochecks  Monitor vitals  May consider MRI of the spine and LP if indicated.  Will defer to neurology assessment.    2: Type 2 diabetes  Hemoglobin A1c  Resume basal insulin and insulin sliding scale  Monitor labs per protocol.    3: Hypothyroidism: TSH was 9.9 at outside facility.  We will repeat labs and continue levothyroxine therapy.    4.  Migraine headaches and chronic dizziness: Complex migraines may present with other neurological symptoms.  I will defer to neurology for further clarifications.    Cardiac monitoring.      DVT prophylaxis: SCDs  Medical Decision Making  Number and Complexity of problems: 1 acute problem with high complexity.  Multiple chronic medical conditions  Differential Diagnosis: As above    Conditions and Status        Condition is unchanged.     [facility] Data  External documents reviewed: Epic  records, facesheet and Care Everywhere  Cardiac tracing (EKG, telemetry) interpretation: EKG from outside facility shows sinus bradycardia.  Radiology interpretation: CT head, CT abdomen pelvis from outside facility reviewed  Labs reviewed: Labs from outside facility reviewed  Any tests that were considered but not ordered: N/A     Decision rules/scores evaluated (example [id number], [place], etc): N/A     Discussed with: Patient and her spouse who was in the room     Care Planning  Shared decision making: Patient apprised of current labs, vitals, imaging and treatment plan.  They are agreeable with proceeding with plans as discussed.    Code status and discussions: ....  Code Status and Medical Interventions:   Ordered at: 10/16/23 1440     Level Of Support Discussed With:    Patient     Code Status (Patient has no pulse and is not breathing):    CPR (Attempt to Resuscitate)     Medical Interventions (Patient has pulse or is breathing):    Full Support         Disposition  Social Determinants of Health that impact treatment or disposition: none  Estimated length of stay is 1-2 days.     I confirmed that the patient's advanced care plan is present, code status is documented, and a surrogate decision maker is listed in the patient's medical record.     The patient's surrogate decision maker is spouse. Information confirmed on ACP documentation.     The patient was seen and examined by me on 10/16/23 at 1400.    Electronically signed by Jose Avery MD, 10/16/23, 15:19 CDT.

## 2023-10-17 ENCOUNTER — APPOINTMENT (OUTPATIENT)
Dept: MRI IMAGING | Facility: HOSPITAL | Age: 68
End: 2023-10-17
Payer: COMMERCIAL

## 2023-10-17 LAB
ALBUMIN SERPL-MCNC: 4.2 G/DL (ref 3.5–5.2)
ALBUMIN/GLOB SERPL: 1.7 G/DL
ALP SERPL-CCNC: 58 U/L (ref 39–117)
ALT SERPL W P-5'-P-CCNC: 14 U/L (ref 1–33)
ANION GAP SERPL CALCULATED.3IONS-SCNC: 7 MMOL/L (ref 5–15)
AST SERPL-CCNC: 13 U/L (ref 1–32)
BASOPHILS # BLD AUTO: 0.03 10*3/MM3 (ref 0–0.2)
BASOPHILS NFR BLD AUTO: 0.4 % (ref 0–1.5)
BILIRUB SERPL-MCNC: 0.2 MG/DL (ref 0–1.2)
BUN SERPL-MCNC: 14 MG/DL (ref 8–23)
BUN/CREAT SERPL: 18.4 (ref 7–25)
CALCIUM SPEC-SCNC: 9.1 MG/DL (ref 8.6–10.5)
CHLORIDE SERPL-SCNC: 105 MMOL/L (ref 98–107)
CO2 SERPL-SCNC: 28 MMOL/L (ref 22–29)
CREAT SERPL-MCNC: 0.76 MG/DL (ref 0.57–1)
DEPRECATED RDW RBC AUTO: 46.7 FL (ref 37–54)
EGFRCR SERPLBLD CKD-EPI 2021: 86 ML/MIN/1.73
EOSINOPHIL # BLD AUTO: 0.1 10*3/MM3 (ref 0–0.4)
EOSINOPHIL NFR BLD AUTO: 1.2 % (ref 0.3–6.2)
ERYTHROCYTE [DISTWIDTH] IN BLOOD BY AUTOMATED COUNT: 15.3 % (ref 12.3–15.4)
FOLATE SERPL-MCNC: 17.3 NG/ML (ref 4.78–24.2)
GLOBULIN UR ELPH-MCNC: 2.5 GM/DL
GLUCOSE BLDC GLUCOMTR-MCNC: 125 MG/DL (ref 70–130)
GLUCOSE BLDC GLUCOMTR-MCNC: 144 MG/DL (ref 70–130)
GLUCOSE BLDC GLUCOMTR-MCNC: 158 MG/DL (ref 70–130)
GLUCOSE BLDC GLUCOMTR-MCNC: 206 MG/DL (ref 70–130)
GLUCOSE SERPL-MCNC: 153 MG/DL (ref 65–99)
HCT VFR BLD AUTO: 38.1 % (ref 34–46.6)
HGB BLD-MCNC: 12.1 G/DL (ref 12–15.9)
IMM GRANULOCYTES # BLD AUTO: 0.02 10*3/MM3 (ref 0–0.05)
IMM GRANULOCYTES NFR BLD AUTO: 0.2 % (ref 0–0.5)
LYMPHOCYTES # BLD AUTO: 3.42 10*3/MM3 (ref 0.7–3.1)
LYMPHOCYTES NFR BLD AUTO: 41.4 % (ref 19.6–45.3)
MCH RBC QN AUTO: 26.7 PG (ref 26.6–33)
MCHC RBC AUTO-ENTMCNC: 31.8 G/DL (ref 31.5–35.7)
MCV RBC AUTO: 84.1 FL (ref 79–97)
MONOCYTES # BLD AUTO: 0.52 10*3/MM3 (ref 0.1–0.9)
MONOCYTES NFR BLD AUTO: 6.3 % (ref 5–12)
NEUTROPHILS NFR BLD AUTO: 4.17 10*3/MM3 (ref 1.7–7)
NEUTROPHILS NFR BLD AUTO: 50.5 % (ref 42.7–76)
NRBC BLD AUTO-RTO: 0 /100 WBC (ref 0–0.2)
PLATELET # BLD AUTO: 161 10*3/MM3 (ref 140–450)
PMV BLD AUTO: 9.5 FL (ref 6–12)
POTASSIUM SERPL-SCNC: 4 MMOL/L (ref 3.5–5.2)
PROT SERPL-MCNC: 6.7 G/DL (ref 6–8.5)
RBC # BLD AUTO: 4.53 10*6/MM3 (ref 3.77–5.28)
SODIUM SERPL-SCNC: 140 MMOL/L (ref 136–145)
VIT B12 BLD-MCNC: 571 PG/ML (ref 211–946)
WBC NRBC COR # BLD: 8.26 10*3/MM3 (ref 3.4–10.8)

## 2023-10-17 PROCEDURE — 99233 SBSQ HOSP IP/OBS HIGH 50: CPT | Performed by: CLINICAL NURSE SPECIALIST

## 2023-10-17 PROCEDURE — 85025 COMPLETE CBC W/AUTO DIFF WBC: CPT | Performed by: STUDENT IN AN ORGANIZED HEALTH CARE EDUCATION/TRAINING PROGRAM

## 2023-10-17 PROCEDURE — 97166 OT EVAL MOD COMPLEX 45 MIN: CPT

## 2023-10-17 PROCEDURE — 0 GADOBENATE DIMEGLUMINE 529 MG/ML SOLUTION: Performed by: STUDENT IN AN ORGANIZED HEALTH CARE EDUCATION/TRAINING PROGRAM

## 2023-10-17 PROCEDURE — 97116 GAIT TRAINING THERAPY: CPT

## 2023-10-17 PROCEDURE — G0378 HOSPITAL OBSERVATION PER HR: HCPCS

## 2023-10-17 PROCEDURE — 97110 THERAPEUTIC EXERCISES: CPT

## 2023-10-17 PROCEDURE — 86051 AQUAPORIN-4 ANTB ELISA: CPT | Performed by: CLINICAL NURSE SPECIALIST

## 2023-10-17 PROCEDURE — 80053 COMPREHEN METABOLIC PANEL: CPT | Performed by: STUDENT IN AN ORGANIZED HEALTH CARE EDUCATION/TRAINING PROGRAM

## 2023-10-17 PROCEDURE — 70553 MRI BRAIN STEM W/O & W/DYE: CPT

## 2023-10-17 PROCEDURE — A9577 INJ MULTIHANCE: HCPCS | Performed by: STUDENT IN AN ORGANIZED HEALTH CARE EDUCATION/TRAINING PROGRAM

## 2023-10-17 PROCEDURE — 97162 PT EVAL MOD COMPLEX 30 MIN: CPT

## 2023-10-17 PROCEDURE — 82948 REAGENT STRIP/BLOOD GLUCOSE: CPT

## 2023-10-17 PROCEDURE — 63710000001 INSULIN LISPRO (HUMAN) PER 5 UNITS: Performed by: STUDENT IN AN ORGANIZED HEALTH CARE EDUCATION/TRAINING PROGRAM

## 2023-10-17 PROCEDURE — 63710000001 INSULIN DETEMIR PER 5 UNITS: Performed by: STUDENT IN AN ORGANIZED HEALTH CARE EDUCATION/TRAINING PROGRAM

## 2023-10-17 RX ORDER — ATORVASTATIN CALCIUM 40 MG/1
80 TABLET, FILM COATED ORAL NIGHTLY
Status: DISCONTINUED | OUTPATIENT
Start: 2023-10-17 | End: 2023-10-20 | Stop reason: HOSPADM

## 2023-10-17 RX ADMIN — INSULIN LISPRO 3 UNITS: 100 INJECTION, SOLUTION INTRAVENOUS; SUBCUTANEOUS at 08:18

## 2023-10-17 RX ADMIN — METHOCARBAMOL 500 MG: 500 TABLET, FILM COATED ORAL at 22:19

## 2023-10-17 RX ADMIN — DOCUSATE SODIUM 50 MG AND SENNOSIDES 8.6 MG 2 TABLET: 8.6; 5 TABLET, FILM COATED ORAL at 08:15

## 2023-10-17 RX ADMIN — ACETAMINOPHEN 650 MG: 325 TABLET, FILM COATED ORAL at 22:19

## 2023-10-17 RX ADMIN — ATORVASTATIN CALCIUM 80 MG: 40 TABLET ORAL at 22:19

## 2023-10-17 RX ADMIN — Medication 10 ML: at 22:20

## 2023-10-17 RX ADMIN — Medication 1000 MCG: at 08:14

## 2023-10-17 RX ADMIN — Medication 1 CAPSULE: at 08:14

## 2023-10-17 RX ADMIN — Medication 10 ML: at 08:18

## 2023-10-17 RX ADMIN — PANCRELIPASE 12000 UNITS OF LIPASE: 60000; 12000; 38000 CAPSULE, DELAYED RELEASE PELLETS ORAL at 08:15

## 2023-10-17 RX ADMIN — HYDROCODONE BITARTRATE AND ACETAMINOPHEN 1 TABLET: 7.5; 325 TABLET ORAL at 14:44

## 2023-10-17 RX ADMIN — LEVOTHYROXINE SODIUM 175 MCG: 150 TABLET ORAL at 06:18

## 2023-10-17 RX ADMIN — INSULIN DETEMIR 10 UNITS: 100 INJECTION, SOLUTION SUBCUTANEOUS at 22:20

## 2023-10-17 RX ADMIN — Medication 5 MG: at 22:19

## 2023-10-17 RX ADMIN — PANCRELIPASE 12000 UNITS OF LIPASE: 60000; 12000; 38000 CAPSULE, DELAYED RELEASE PELLETS ORAL at 17:08

## 2023-10-17 RX ADMIN — GADOBENATE DIMEGLUMINE 20 ML: 529 INJECTION, SOLUTION INTRAVENOUS at 18:10

## 2023-10-17 RX ADMIN — SUMATRIPTAN SUCCINATE 50 MG: 50 TABLET, FILM COATED ORAL at 06:18

## 2023-10-17 RX ADMIN — Medication 27 MG: at 08:14

## 2023-10-17 RX ADMIN — Medication 5000 UNITS: at 08:14

## 2023-10-17 RX ADMIN — PANCRELIPASE 12000 UNITS OF LIPASE: 60000; 12000; 38000 CAPSULE, DELAYED RELEASE PELLETS ORAL at 12:40

## 2023-10-17 RX ADMIN — SERTRALINE HYDROCHLORIDE 100 MG: 100 TABLET, FILM COATED ORAL at 08:14

## 2023-10-17 RX ADMIN — INSULIN LISPRO 5 UNITS: 100 INJECTION, SOLUTION INTRAVENOUS; SUBCUTANEOUS at 12:40

## 2023-10-17 RX ADMIN — Medication 27 MG: at 22:19

## 2023-10-17 RX ADMIN — MONTELUKAST 10 MG: 10 TABLET, FILM COATED ORAL at 22:19

## 2023-10-17 NOTE — THERAPY EVALUATION
Patient Name: Charlene Bey  : 1955    MRN: [id number]                              Today's Date: 10/17/2023       Admit Date: 10/16/2023    Visit Dx:     ICD-10-CM ICD-9-CM   1. Gait abnormality [R26.9]  R26.9 781.2     Patient Active Problem List   Diagnosis    Paresthesia and pain of right extremity    Type 2 diabetes mellitus with hyperglycemia    Hypothyroidism (acquired)    Chronic pancreatitis    Nonintractable migraine    Dizziness, nonspecific     Past Medical History:   Diagnosis Date    Cystocele with rectocele     Diabetes mellitus     Goiter     Hashimoto's thyroiditis     Headache     Hypothyroidism     Mumps pancreatitis     Pancreatitis     Peptic ulceration     Thyroid nodule      Past Surgical History:   Procedure Laterality Date    BLADDER SUSPENSION      CHOLECYSTECTOMY      COLOSTOMY      CYSTOCELE REPAIR      HERNIA REPAIR      HYSTERECTOMY      OVARIAN CYST REMOVAL      RECTOCELE REPAIR      SMALL INTESTINE SURGERY      TONSILLECTOMY        General Information       Row Name 10/17/23 0849          Physical Therapy Time and Intention    Document Type evaluation;other (see comments)  see MAR  -JE     Mode of Treatment physical therapy  -JE       Row Name 10/17/23 0849          General Information    Patient Profile Reviewed yes  -JE     Prior Level of Function independent:;all household mobility;community mobility;ADL's;home management;driving;shopping  -JE     Existing Precautions/Restrictions fall  -JE     Barriers to Rehab medically complex  -JE       Row Name 10/17/23 0849          Living Environment    People in Home spouse  -JE     Name(s) of People in Home Duong - spouse  -JE       Row Name 10/17/23 0849          Home Main Entrance    Number of Stairs, Main Entrance none  -JE       Row Name 10/17/23 0849          Stairs Within Home, Primary    Number of Stairs, Within Home, Primary none  -JE       Row Name 10/17/23 0849          Cognition    Orientation Status (Cognition) oriented x  4  -       Row Name 10/17/23 0849          Safety Issues, Functional Mobility    Safety Issues Affecting Function (Mobility) at risk behavior observed;insight into deficits/self-awareness;positioning of assistive device;safety precaution awareness  -     Impairments Affecting Function (Mobility) balance;endurance/activity tolerance;sensation/sensory awareness;pain  -               User Key  (r) = Recorded By, (t) = Taken By, (c) = Cosigned By      Initials Name Provider Type    Tiffani Barajas, PT Physical Therapist                   Mobility       Row Name 10/17/23 0849          Bed Mobility    Bed Mobility supine-sit  -     Scooting/Bridging [place] (Bed Mobility) standby assist;independent  -     Supine-Sit [place] (Bed Mobility) standby assist  -     Assistive Device (Bed Mobility) bed rails;head of bed elevated  -       Row Name 10/17/23 0849          Transfers    Comment, (Transfers) on/off toilet w/ min to CGA  -       Row Name 10/17/23 0849          Sit-Stand Transfer    Sit-Stand [place] (Transfers) contact guard;verbal cues  -     Comment, (Sit-Stand Transfer) requires education for safety and improved tech  -       Row Name 10/17/23 0849          Gait/Stairs (Locomotion)    [place] Level (Gait) contact guard;verbal cues  -     Assistive Device (Gait) walker, front-wheeled  -     Distance in Feet (Gait) ~ 80 ft, 1 LOB requiring assist to recover  -     Deviations/Abnormal Patterns (Gait) gait speed decreased;base of support, wide  -     Comment, (Gait/Stairs) decrease control w/ R LE foot placement, however very intentional in taking step w/ R foot  -               User Key  (r) = Recorded By, (t) = Taken By, (c) = Cosigned By      Initials Name Provider Type    Tiffani Barajas, PT Physical Therapist                   Obj/Interventions       Row Name 10/17/23 0849          Range of Motion Comprehensive    Comment, General Range of Motion AROM all 4  extremities WFLs except R LE AAROM WFLs  -       Row Name 10/17/23 0849          Strength Comprehensive (MMT)    Comment, General Manual Muscle Testing (MMT) Assessment defer to OT for formal UE strength assessment, however pt w/ noted L shld weakness compared to R; R LE hip flexor 2/5, R knee flex/extensor 2/5, R ankle PF 4/5, R ankle DF 2+ to 3-/5  -       Row Name 10/17/23 0849          Motor Skills    Motor Skills other (see comments)  -       Row Name 10/17/23 0849          Balance    Balance Assessment sitting static balance;sitting dynamic balance;sit to stand dynamic balance;standing static balance;standing dynamic balance  -     Static Sitting Balance independent  -     Dynamic Sitting Balance independent  -     Position, Sitting Balance supported;unsupported;sitting edge of bed;sitting in chair  -     Sit to Stand Dynamic Balance contact guard;verbal cues  -     Static Standing Balance contact guard  -     Dynamic Standing Balance contact guard;verbal cues  -     Position/Device Used, Standing Balance supported;walker, rolling  -       Row Name 10/17/23 0849          Sensory Assessment (Somatosensory)    Sensory Assessment (Somatosensory) other (see comments)  decrease throughout R LE, worse in thigh medial/lateral, improves in lower leg, and even better in foot, however remains diminished  -               User Key  (r) = Recorded By, (t) = Taken By, (c) = Cosigned By      Initials Name Provider Type    Tiffani Barajas, PT Physical Therapist                   Goals/Plan       Row Name 10/17/23 0849          Bed Mobility Goal 1 (PT)    Activity/Assistive Device (Bed Mobility Goal 1, PT) bed mobility activities, all  -     [place] Level/Cues Needed (Bed Mobility Goal 1, PT) independent  -     Time Frame (Bed Mobility Goal 1, PT) long term goal (LTG);10 days  -     Progress/Outcomes (Bed Mobility Goal 1, PT) goal ongoing  -       Row Name 10/17/23 0849           Transfer Goal 1 (PT)    Activity/Assistive Device (Transfer Goal 1, PT) sit-to-stand/stand-to-sit;bed-to-chair/chair-to-bed;other (see comments)  rwx for bed to/from chair  -JE     [place] Level/Cues Needed (Transfer Goal 1, PT) standby assist  -JE     Time Frame (Transfer Goal 1, PT) long term goal (LTG);10 days  -JE     Progress/Outcome (Transfer Goal 1, PT) goal ongoing  -       Row Name 10/17/23 0849          Gait Training Goal 1 (PT)    Activity/Assistive Device (Gait Training Goal 1, PT) gait (walking locomotion);assistive device use;decrease fall risk;diminish gait deviation;improve balance and speed;increase endurance/gait distance;walker, rolling  -JE     [place] Level (Gait Training Goal 1, PT) standby assist  -JE     Distance (Gait Training Goal 1, PT) 100 ft w/out LOB w/ consistent placement of R foot w/ stepping  -     Time Frame (Gait Training Goal 1, PT) long term goal (LTG);10 days  -JE     Progress/Outcome (Gait Training Goal 1, PT) goal ongoing  -       Row Name 10/17/23 0849          Patient Education Goal (PT)    Activity (Patient Education Goal, PT) HEP for R LE strengthening/coordination  -     [place]/Cues/Accuracy (Memory Goal 2, PT) demonstrates adequately;independent;verbalizes understanding  -     Time Frame (Patient Education Goal, PT) long term goal (LTG);10 days  -JE     Progress/Outcome (Patient Education Goal, PT) goal ongoing  -       Row Name 10/17/23 08          Therapy Assessment/Plan (PT)    Planned Therapy Interventions (PT) balance training;bed mobility training;gait training;home exercise program;patient/family education;neuromuscular re-education;motor coordination training;strengthening;transfer training;other (see comments)  safety/falls prevention  -               User Key  (r) = Recorded By, (t) = Taken By, (c) = Cosigned By      Initials Name Provider Type    Tiffani Barajas, PT Physical Therapist                   Clinical Impression        Row Name 10/17/23 0849          Pain    Pretreatment Pain Rating 3/10  -     Posttreatment Pain Rating 1/10  -     Pain Location lower  -     Pain Location - back  -     Pre/Posttreatment Pain Comment however reports she has not had a bowel mvmt, prior to visit  -     Pain Intervention(s) Repositioned;Rest  -       Row Name 10/17/23 0849          Plan of Care Review    Plan of Care Reviewed With patient  -     Progress improving  -     Outcome Evaluation PT eval completed.  Pt pleasant, oriented X 4, and agreeable to therapy w/ a very positive attitude.  Pt continues to report R LE weakness and numbness from chest down to R foot all on R side.  Noted mild facial flattening on R.  Pt however, does report her strength and sensation has improved some since yesterday.  Pt also reports hx of vertigo w/ treatment that has been helpful in the past.  Pt education to utilize a focal point w/ transitional mvmts to assist w/ mgmt of her vertigo.  Pt performed bed mobility w/ SBA to I, tfers w/ CGA w/ education for safe techniques, and performs gait w/ rwx w/ CGA.  1 LOB requiring assist to recover noting inconsistent placement of R LE w/ lack of control however intentional when taking steps.  Pt requires education for improved awareness of safety w/ functional mobility.  Pt would greatly benefit from short stay in acute rehab prior to returning home to further improve knowledge, strength and I w/ functional mobility working to reduce fall risk.  Will follow for progress and needs.  -       Row Name 10/17/23 0849          Therapy Assessment/Plan (PT)    Patient/Family Therapy Goals Statement (PT) improve mobility and I  -JE     Rehab Potential (PT) good, to achieve stated therapy goals  -     Criteria for Skilled Interventions Met (PT) yes;meets criteria;skilled treatment is necessary  -     Therapy Frequency (PT) 2 times/day  -     Predicted Duration of Therapy Intervention (PT) until discharge or  goals achieved  -       Row Name 10/17/23 0849          Vital Signs    O2 Delivery Pre Treatment room air  -JE     O2 Delivery Intra Treatment room air  -JE     O2 Delivery Post Treatment room air  -JE     Pre Patient Position Supine  -     Intra Patient Position Standing  -     Post Patient Position Sitting  -       Row Name 10/17/23 0849          Positioning and Restraints    Pre-Treatment Position in bed  -     Post Treatment Position chair  -     In Chair reclined;call light within reach;encouraged to call for assist;legs elevated  -               User Key  (r) = Recorded By, (t) = Taken By, (c) = Cosigned By      Initials Name Provider Type    Tiffani Barajas, PT Physical Therapist                   Outcome Measures       Row Name 10/17/23 0849 10/17/23 0757       How much help from another person do you currently need...    Turning from your back to your side while in flat bed without using bedrails? 3  -JE 4  -KP    Moving from lying on back to sitting on the side of a flat bed without bedrails? 3  -JE 4  -KP    Moving to and from a bed to a chair (including a wheelchair)? 3  -JE 3  -KP    Standing up from a chair using your arms (e.g., wheelchair, bedside chair)? 3  -JE 3  -KP    Climbing 3-5 steps with a railing? 2  -JE 2  -KP    To walk in hospital room? 3  -JE 3  -KP    AM-PAC 6 Clicks Score (PT) 17  -JE 19  -KP    Highest level of mobility 5 --> Static standing  - 6 --> Walked 10 steps or more  -KP      Row Name 10/17/23 0400          How much help from another person do you currently need...    Turning from your back to your side while in flat bed without using bedrails? 4  -SB     Moving from lying on back to sitting on the side of a flat bed without bedrails? 4  -SB     Moving to and from a bed to a chair (including a wheelchair)? 3  -SB     Standing up from a chair using your arms (e.g., wheelchair, bedside chair)? 3  -SB     Climbing 3-5 steps with a railing? 2  -SB     To walk  in hospital room? 3  -SB     AM-PAC 6 Clicks Score (PT) 19  -SB     Highest level of mobility 6 --> Walked 10 steps or more  -SB       Row Name 10/17/23 0857 10/17/23 0849       Functional Assessment    Outcome Measure Options AM-PAC 6 Clicks Daily Activity (OT)  -ODETTE (r) MB (t) CS (c) AM-PAC 6 Clicks Basic Mobility (PT)  -GLENNA              User Key  (r) = Recorded By, (t) = Taken By, (c) = Cosigned By      Initials Name Provider Type    CS Veronica Barros S, OTR/L, CNT Occupational Therapist    Willie Ellis, RN Registered Nurse    Tiffani Barajas, PT Physical Therapist    Misha Rubalcava, OT Student OT Student    Carlos Alberto Beck, RN Registered Nurse                                 Physical Therapy Education       Title: PT OT SLP Therapies (Done)       Topic: Physical Therapy (Done)       Point: Mobility training (Done)       Learning Progress Summary             Patient Acceptance, E,TB,D, VU,DU,NR by  at 10/17/2023 1014    Comment: education re: purpose of PT/importance of activity, for safety/falls prevention, proper use of rwx w/ emphasis on gait sequencing, placement of rwx, hand placement w/ transitions, T/I AA HS and quad sets w/ return demo                         Point: Home exercise program (Done)       Learning Progress Summary             Patient Acceptance, E,TB,D, VU,DU,NR by  at 10/17/2023 1014    Comment: education re: purpose of PT/importance of activity, for safety/falls prevention, proper use of rwx w/ emphasis on gait sequencing, placement of rwx, hand placement w/ transitions, T/I AA HS and quad sets w/ return demo                         Point: Precautions (Done)       Learning Progress Summary             Patient Acceptance, E,TB,D, VU,DU,NR by  at 10/17/2023 1014    Comment: education re: purpose of PT/importance of activity, for safety/falls prevention, proper use of rwx w/ emphasis on gait sequencing, placement of rwx, hand placement w/ transitions, T/I AA HS and quad  sets w/ return demo                                         User Key       Initials Effective Dates Name Provider Type Discipline     08/02/18 -  Tiffani York, PT Physical Therapist PT                  PT Recommendation and Plan  Planned Therapy Interventions (PT): balance training, bed mobility training, gait training, home exercise program, patient/family education, neuromuscular re-education, motor coordination training, strengthening, transfer training, other (see comments) (safety/falls prevention)  Plan of Care Reviewed With: patient  Progress: improving  Outcome Evaluation: PT eval completed.  Pt pleasant, oriented X 4, and agreeable to therapy w/ a very positive attitude.  Pt continues to report R LE weakness and numbness from chest down to R foot all on R side.  Noted mild facial flattening on R.  Pt however, does report her strength and sensation has improved some since yesterday.  Pt also reports hx of vertigo w/ treatment that has been helpful in the past.  Pt education to utilize a focal point w/ transitional mvmts to assist w/ mgmt of her vertigo.  Pt performed bed mobility w/ SBA to I, tfers w/ CGA w/ education for safe techniques, and performs gait w/ rwx w/ CGA.  1 LOB requiring assist to recover noting inconsistent placement of R LE w/ lack of control however intentional when taking steps.  Pt requires education for improved awareness of safety w/ functional mobility.  Pt would greatly benefit from short stay in acute rehab prior to returning home to further improve knowledge, strength and I w/ functional mobility working to reduce fall risk.  Will follow for progress and needs.     Time Calculation:         PT Charges       Row Name 10/17/23 1016             Time Calculation    Start Time 0849  -      Stop Time 1015  -      Time Calculation (min) 86 min  -      PT Received On 10/17/23  -      PT Goal Re-Cert Due Date 10/27/23  -                User Key  (r) = Recorded By, (t) =  Taken By, (c) = Cosigned By      Initials Name Provider Type    JE Tiffani York, PT Physical Therapist                  Therapy Charges for Today       Code Description Service Date Service Provider Modifiers Qty    94985108986 HC PT EVAL MOD COMPLEXITY 4 10/17/2023 Tiffani York, PT GP 1    [id number] HC GAIT TRAINING EA 15 MIN 10/17/2023 Tiffani York, PT GP 2            PT G-Codes  Outcome Measure Options: AM-PAC 6 Clicks Daily Activity (OT)  AM-PAC 6 Clicks Score (PT): 17  AM-PAC 6 Clicks Score (OT): 21  PT Discharge Summary  Anticipated Discharge Disposition (PT): inpatient rehabilitation facility    Tiffani York PT  10/17/2023

## 2023-10-17 NOTE — PROGRESS NOTES
[facility] Medicine Services  INPATIENT PROGRESS NOTE    Patient Name: Charlene Bey  Date of Admission: 10/16/2023  Today's Date: 10/17/23  Length of Stay: 0  Primary Care Physician: Alex Saravia APRN    Subjective   Chief Complaint: right leg numbness   HPI   Ms. Bey is a 67-year-old female who presented to [facility] on 10/16 for right lower extremity paresthesia.  Patient was COVID-positive on 10/11/2023.  She started Paxlovid and had improvement in symptoms.  On 10/16, she awoke in her usual state of health when she noted sudden numbness to right foot.  Numbness progressed to the entire right side to upper abdomen. She also noted right lower extremity weakness and had difficulty raising right leg. She also notes pain that begins in abdomen and radiates to her back similar to prior pancreatitis attacks.  She originally presented to [facility] emergency department. CT head was negative, CT abd/pelvis showed large hiatal hernia, moderate amount of stool, fat containing umbilical and supra umbilical hernia, left and right ingquinal hernias. amylase/lipase WNL. TSH 9.92. Dr. JOSE Overton discussed case with Dr. ELLE Lynn and patient accepted for transfer.     Sitting on the edge of the bed with spouse at bedside.  She has numbness from the right side of her stomach down to her right leg.  She feels her right lower extremity weakness is improving and she is able to lift her right leg more. Neurology has ordered for MRI brain today. She is not able to go for MRI brain with and without contrast until after 5 PM as she had contrast yesterday.  She has worked with PT and OT today.  Therapy recommends discharge to acute rehab.    Review of Systems   All pertinent negatives and positives are as above. All other systems have been reviewed and are negative unless otherwise stated.     Objective    Temp:  [97.9 °F (36.6 °C)-98.7 °F (37.1 °C)] 98.7 °F (37.1 °C)  Heart  Rate:  [53-60] 58  Resp:  [16-18] 18  BP: (110-124)/(54-72) 119/72  Physical Exam  Vitals reviewed.   Constitutional:       General: She is not in acute distress.     Appearance: She is obese. She is not toxic-appearing.      Comments: Sitting on the edge of the bed with spouse at bedside.  No acute distress.  On room air.  Discussed with her nurse Kaylee.   HENT:      Head: Normocephalic and atraumatic.      Mouth/Throat:      Mouth: Mucous membranes are moist.      Pharynx: Oropharynx is clear.   Eyes:      Extraocular Movements: Extraocular movements intact.      Conjunctiva/sclera: Conjunctivae normal.      Pupils: Pupils are equal, round, and reactive to light.   Cardiovascular:      Rate and Rhythm: Normal rate and regular rhythm.      Pulses: Normal pulses.   Pulmonary:      Effort: Pulmonary effort is normal. No respiratory distress.      Breath sounds: Normal breath sounds.   Abdominal:      General: Bowel sounds are normal. There is no distension.      Palpations: Abdomen is soft.      Tenderness: There is no abdominal tenderness.   Musculoskeletal:         General: No swelling or tenderness. Normal range of motion.      Cervical back: Normal range of motion and neck supple. No muscular tenderness.   Skin:     General: Skin is warm and dry.      Findings: No erythema or rash.   Neurological:      General: No focal deficit present.      Mental Status: She is alert and oriented to person, place, and time.      Sensory: Sensory deficit (She has numbness from the right side of her stomach down to her right leg.) present.      Motor: Weakness (RLE) present.      Gait: Gait abnormal.   Psychiatric:         Mood and Affect: Mood normal.         Behavior: Behavior normal.         Results Review:  I have reviewed the labs, radiology results, and diagnostic studies.    Laboratory Data:   Results from last 7 days   Lab Units 10/17/23  0445 10/16/23  1551   WBC 10*3/mm3 8.26 8.11   HEMOGLOBIN g/dL 12.1 12.5  "  HEMATOCRIT % 38.1 38.6   PLATELETS 10*3/mm3 161 186        Results from last 7 days   Lab Units 10/17/23  0445 10/16/23  1551   SODIUM mmol/L 140 139   POTASSIUM mmol/L 4.0 3.9   CHLORIDE mmol/L 105 104   CO2 mmol/L 28.0 27.0   BUN mg/dL 14 16   CREATININE mg/dL 0.76 0.75   CALCIUM mg/dL 9.1 9.2   BILIRUBIN mg/dL 0.2 0.2   ALK PHOS U/L 58 63   ALT (SGPT) U/L 14 16   AST (SGOT) U/L 13 15   GLUCOSE mg/dL 153* 195*       Culture Data:   No results found for: \"ACANTHNAEG\", \"AFBCX\", \"BPERTUSSISCX\", \"BLOODCX\"  No results found for: \"BCIDPCR\", \"CXREFLEX\", \"CSFCX\", \"CULTURETIS\"  No results found for: \"CULTURES\", \"HSVCX\", \"URCX\"  No results found for: \"EYECULTURE\", \"GCCX\", \"HSVCULTURE\", \"LABHSV\"  No results found for: \"LEGIONELLA\", \"MRSACX\", \"MUMPSCX\", \"MYCOPLASCX\"  No results found for: \"NOCARDIACX\", \"STOOLCX\"  No results found for: \"THROATCX\", \"UNSTIMCULT\", \"URINECX\", \"CULTURE\", \"VZVCULTUR\"  No results found for: \"VIRALCULTU\", \"WOUNDCX\"    Radiology Data:   Imaging Results (Last 24 Hours)       Procedure Component Value Units Date/Time    MRI Thoracic Spine With & Without Contrast [[id number]] Collected: 10/16/23 2058     Updated: 10/16/23 2107    Narrative:      EXAM/TECHNIQUE: MRI of thoracic spine without and with IV contrast     INDICATION: Right-sided abdominal paresthesias, right lower extremity  weakness and paresthesia     COMPARISON: None     FINDINGS:     Thoracic kyphosis and alignment are maintained. Vertebral bodies are of  normal height and signal intensity. No significant vertebral body marrow  edema. Mild diffuse disc desiccation. Mild multilevel thoracic spine  degenerative change with small endplate osteophytes and mild facet  arthropathy. No area of high-grade central canal or neural foraminal  stenosis. Multiple small disc bulges are present throughout the thoracic  spine. No signal in the thoracic cord which can be corroborated on more  than one plane of imaging. Paravertebral soft tissues are " unremarkable.  Large hiatal hernia. Following contrast administration, no abnormal foci  of enhancement are identified.          Impression:      1.  No acute findings.  2.  Mild multilevel degenerative change.   3.  Large hiatal hernia.        This report was signed and finalized on 10/16/2023 9:04 PM CDT by Dr. Luis Chawla MD.       MRI Lumbar Spine With & Without Contrast [[id number]] Collected: 10/16/23 1950     Updated: 10/16/23 2000    Narrative:      EXAM/TECHNIQUE: MRI lumbar spine without and with IV contrast     INDICATION: Right-sided paresthesias, right lower extremity weakness     COMPARISON: None     FINDINGS:     This report assumes 5 lumbar type vertebral bodies. 8 mm anterolisthesis  of L4 on L5. No other subluxation. Lumbar spine alignment is maintained.  No significant vertebral body marrow edema. No vertebral body height  loss. Mild multilevel mixed Modic type degenerative endplate changes  greatest at L4-L5 and L5-S1. Discs are moderately desiccated throughout.  Moderate multilevel lumbar spine degenerative changes described in  further detail level by level below. The distal cord/conus appears  normal. Prevertebral soft tissues are unremarkable. Following contrast  administration, no abnormal foci of enhancement are identified. No  epidural collections. No paraspinal collection     Multilevel degenerative change:  L1-L2: Disc bulge flattens the ventral thecal sac mildly narrowing the  central canal. Facet arthropathy mildly narrows both neural foramina.     L2-L3: Disc bulge flattens the ventral thecal sac. Along with facet  arthropathy, there is mild central canal narrowing and mild bilateral  neural foraminal narrowing.     L3-L4: Small disc bulge and facet arthropathy causes mild to moderate  central canal stenosis and mild bilateral neural foraminal stenosis.     L4-L5: Small disc bulge and facet arthropathy causes mild central canal  stenosis and mild left neural foraminal stenosis.  There is moderate  right neural foraminal stenosis with disc/osteophyte material closely  approximating and likely abutting the exiting right L4 nerve root.     L5-S1: Central canal is widely patent. Anterolisthesis and disc bulge  causes mild left neural foraminal stenosis and moderate right neural  foraminal stenosis. Disc/osteophyte material closely approximates and  likely abuts the exiting right L5 nerve root.       Impression:         1.  No acute osseous findings.  2.  8 mm anterolisthesis of L5 on S1.  3.  Moderate multilevel degenerative change, as described above. No area  of severe central canal stenosis. There is moderate right sided neural  foraminal stenosis at L4-L5 and L5-S1 with disc/osteophyte material  closely approximating and likely abutting the exiting right L4 and L5  nerve roots.        This report was signed and finalized on 10/16/2023 7:56 PM CDT by Dr. Luis Chawla MD.               I have reviewed the patient's current medications.     Assessment/Plan   Assessment  Active Hospital Problems    Diagnosis     **Paresthesia and pain of right extremity     Type 2 diabetes mellitus with hyperglycemia     Hypothyroidism (acquired)     Chronic pancreatitis     Nonintractable migraine     Dizziness, nonspecific        Treatment Plan  Neurology, Dr. Lynn consulted. MRI TS showed no acute finding. MRI LS showed moderate multlilevel degenerative changes and 8 mm anterolisthesis osf L5 on S1.  Neurology has ordered MRI brain.    , continue high intensity statin.    Type 2 diabetes with A1c 7.8.  Continue Accu-Cheks with sliding scale insulin, Levemir.    Consult PT/OT.  Therapy recommends inpatient rehab.    SCDs for DVT prophylaxis.    Medical Decision Making  Number and Complexity of problems: 1 acute problem with high complexity.  Multiple chronic medical conditions  Differential Diagnosis: Brown-Séquard syndrome     Conditions and Status        Condition is improving.     MDM  Data  External documents reviewed: Epic records, facesheet and Care Everywhere  Cardiac tracing (EKG, telemetry) interpretation: EKG from outside facility shows sinus bradycardia.  Radiology interpretation: CT head, CT abdomen pelvis from outside facility reviewed  Labs reviewed: Labs from outside facility reviewed  Any tests that were considered but not ordered: N/A     Decision rules/scores evaluated (example [id number], Wells, etc): N/A     Discussed with: Patient and her spouse.     Care Planning  Shared decision making: Patient is agreeable to ongoing work-up and treatment  Code status and discussions: Full code with full interventions    Disposition  Social Determinants of Health that impact treatment or disposition: None identified at present  I expect the patient to be discharged to home to be determined.     Electronically signed by GREGORIO Samaniego, 10/17/23, 12:30 CDT.

## 2023-10-17 NOTE — PLAN OF CARE
Goal Outcome Evaluation:  Plan of Care Reviewed With: patient        Progress: improving  Outcome Evaluation: OT eval completed. Pt is alert and oriented x4 and c/o 3/10 lower back pain. Pt IND completed bed mobility and CGA for t/f's. Pt has vertigo and reports slight dizziness coming to sit EOB. She does attend vestibular rehab to address her vertigo. Pt amb to BR with CGA utilizing FWW to help maintain balance. She completed toileting task and grooming task with SBA/CGA. Pt then amb in hallway with CGA. During amb, pt R knee buckled d/t weakness and she almost fell so amb back to chair. Pt self-recovered.  Pt would benefit from OT services to address fxl mobility, balance, and endurance with ADLs/IADLs. Recommend d/c to IP rehab.      Anticipated Discharge Disposition (OT): inpatient rehabilitation facility

## 2023-10-17 NOTE — THERAPY TREATMENT NOTE
Acute Care - Physical Therapy Treatment Note   [place]     Patient Name: Charlene Bey  : 1955  MRN: [id number]  Today's Date: 10/17/2023      Visit Dx:     ICD-10-CM ICD-9-CM   1. Gait abnormality [R26.9]  R26.9 781.2     Patient Active Problem List   Diagnosis    Paresthesia and pain of right extremity    Type 2 diabetes mellitus with hyperglycemia    Hypothyroidism (acquired)    Chronic pancreatitis    Nonintractable migraine    Dizziness, nonspecific     Past Medical History:   Diagnosis Date    Cystocele with rectocele     Diabetes mellitus     Goiter     Hashimoto's thyroiditis     Headache     Hypothyroidism     Mumps pancreatitis     Pancreatitis     Peptic ulceration     Thyroid nodule      Past Surgical History:   Procedure Laterality Date    BLADDER SUSPENSION      CHOLECYSTECTOMY      COLOSTOMY      CYSTOCELE REPAIR      HERNIA REPAIR      HYSTERECTOMY      OVARIAN CYST REMOVAL      RECTOCELE REPAIR      SMALL INTESTINE SURGERY      TONSILLECTOMY       PT Assessment (last 12 hours)       PT Evaluation and Treatment       Row Name 10/17/23 1415 10/17/23 1310       Physical Therapy Time and Intention    Subjective Information complains of;weakness  -TB complains of;weakness  -KJ    Document Type therapy note (daily note)  -TB therapy note (daily note)  -KJ    Mode of Treatment physical therapy  -TB physical therapy  -KJ    Patient Effort good  -TB good  -KJ      Row Name 10/17/23 1415 10/17/23 1310       General Information    Existing Precautions/Restrictions fall  R side weakness/ decreased motor planning and coordination  -TB fall  R side weakness/ decreased motor planning and coordination  -KJ      Row Name 10/17/23 1415 10/17/23 1310       Pain    Pretreatment Pain Rating 6/10  -TB 3/10  -KJ    Posttreatment Pain Rating 6/10  -TB 3/10  -KJ    Pain Location - Side/Orientation Right  -TB Right  -KJ    Pain Location lower  -TB lower  -KJ    Pain Location - back  -TB back  -KJ      Row Name  10/17/23 1415 10/17/23 1310       Bed Mobility    Bed Mobility scooting/bridging;supine-sit  -TB --    Scooting/Bridging Izard (Bed Mobility) independent  -TB --    Supine-Sit [place] (Bed Mobility) independent  -TB --    Comment, (Bed Mobility) -- in bathroom  -KJ      Row Name 10/17/23 1415 10/17/23 1310       Sit-Stand Transfer    Sit-Stand Iz[place] (Transfers) contact guard  -TB contact guard  -KJ    Assistive Device (Sit-Stand Transfers) walker, front-wheeled  -TB walker, front-wheeled  -KJ      Row Name 10/17/23 1415 10/17/23 1310       Stand-Sit Transfer    Stand-Sit Iz[place] (Transfers) verbal cues;contact guard  -TB verbal cues;contact guard  -KJ    Assistive Device (Stand-Sit Transfers) walker, front-wheeled  -TB walker, front-wheeled  -KJ      Row Name 10/17/23 1415 10/17/23 1310       Gait/Stairs (Locomotion)    [place]zard Level (Gait) verbal cues;contact guard;minimum assist (75% patient effort)  -TB verbal cues;contact guard;minimum assist (75% patient effort)  -KJ    Assistive Device (Gait) walker, front-wheeled  -TB walker, front-wheeled  -KJ    Distance in Feet (Gait) 50'x2  -TB 40' x 2  -KJ    Deviations/Abnormal Patterns (Gait) gait speed decreased;right sided deviations;stride length decreased;weight shifting decreased;ataxic  -TB gait speed decreased;right sided deviations;stride length decreased;weight shifting decreased;ataxic  -KJ    Bilateral Gait Deviations heel strike decreased  -TB heel strike decreased  -KJ      Row Name 10/17/23 1415          Balance    Balance Assessment sitting static balance;sitting dynamic balance  -TB     Static Sitting Balance independent  -TB     Dynamic Sitting Balance independent  -TB     Position, Sitting Balance sitting edge of bed  -TB       Row Name 10/17/23 1310          Motor Skills    Therapeutic Exercise aerobic  -KJ       Row Name 10/17/23 1310          Aerobic Exercise    Comment, Aerobic Exercise (Therapeutic Exercise) HEP given  for BLE's strengthening  -KJ       Row Name 10/17/23 1415 10/17/23 1310       Positioning and Restraints    Pre-Treatment Position in bed  -TB in bed  -KJ    Post Treatment Position bed  -TB chair  -KJ    In Bed fowlers;call light within reach;encouraged to call for assist;with family/caregiver;side rails up x2  -TB --    In Chair -- call light within reach  -KJ              User Key  (r) = Recorded By, (t) = Taken By, (c) = Cosigned By      Initials Name Provider Type    Bri Hoffman, PTA Physical Therapist Assistant    Cali Mederos, PTA Physical Therapist Assistant                    Physical Therapy Education       Title: PT OT SLP Therapies (Done)       Topic: Physical Therapy (Done)       Point: Mobility training (Done)       Learning Progress Summary             Patient Acceptance, E,TB,D, LILIBETH,CARMEN,NR by  at 10/17/2023 1014    Comment: education re: purpose of PT/importance of activity, for safety/falls prevention, proper use of rwx w/ emphasis on gait sequencing, placement of rwx, hand placement w/ transitions, T/I AA HS and quad sets w/ return demo                         Point: Home exercise program (Done)       Learning Progress Summary             Patient Acceptance, E,TB,D, LILIBETH,DU,NR by  at 10/17/2023 1014    Comment: education re: purpose of PT/importance of activity, for safety/falls prevention, proper use of rwx w/ emphasis on gait sequencing, placement of rwx, hand placement w/ transitions, T/I AA HS and quad sets w/ return demo                         Point: Precautions (Done)       Learning Progress Summary             Patient Acceptance, E,TB,D, LILIBETH,DU,NR by  at 10/17/2023 1014    Comment: education re: purpose of PT/importance of activity, for safety/falls prevention, proper use of rwx w/ emphasis on gait sequencing, placement of rwx, hand placement w/ transitions, T/I AA HS and quad sets w/ return demo                                         User Key       Initials Effective  Dates Name Provider Type Discipline     08/02/18 -  Tiffani York, PT Physical Therapist PT                  PT Recommendation and Plan             Time Calculation:    PT Charges       Row Name 10/17/23 1604 10/17/23 1359 10/17/23 1016       Time Calculation    Start Time 1415  -TB 1310  -KJ 0849  -    Stop Time 1441  -TB 1333  -KJ 1015  -    Time Calculation (min) 26 min  -TB 23 min  -KJ 86 min  -    PT Received On 10/17/23  -TB -- 10/17/23  -    PT Goal Re-Cert Due Date -- -- 10/27/23  -       Time Calculation- PT    Total Timed Code Minutes- PT 26 minute(s)  -TB 23 minute(s)  -KJ --              User Key  (r) = Recorded By, (t) = Taken By, (c) = Cosigned By      Initials Name Provider Type    Bri Hoffman, PTA Physical Therapist Assistant    TB Cali Laing, PTA Physical Therapist Assistant    Tiffani Barajas, PT Physical Therapist                  Therapy Charges for Today       Code Description Service Date Service Provider Modifiers Qty    [id number] HC GAIT TRAINING EA 15 MIN 10/17/2023 Cali Liang, PTA GP 2            PT G-Codes  Outcome Measure Options: AM-PAC 6 Clicks Daily Activity (OT)  AM-PAC 6 Clicks Score (PT): 17  AM-PAC 6 Clicks Score (OT): 21    Cali Liang PTA  10/17/2023

## 2023-10-17 NOTE — PLAN OF CARE
Goal Outcome Evaluation:  Plan of Care Reviewed With: patient        Progress: improving  Outcome Evaluation: Ntn screen completed. Pt identified with difficulty chewing and swallowing per ntn risk screen. Pt reports she mostly had difficulty with large pills. Pt reports she is tolerating current diet. Will cont to follow for plan of care.

## 2023-10-17 NOTE — THERAPY TREATMENT NOTE
Acute Care - Physical Therapy Treatment Note  [facility]     Patient Name: Charlene Bey  : 1955  MRN: [id number]  Today's Date: 10/17/2023      Visit Dx:     ICD-10-CM ICD-9-CM   1. Gait abnormality [R26.9]  R26.9 781.2     Patient Active Problem List   Diagnosis    Paresthesia and pain of right extremity    Type 2 diabetes mellitus with hyperglycemia    Hypothyroidism (acquired)    Chronic pancreatitis    Nonintractable migraine    Dizziness, nonspecific     Past Medical History:   Diagnosis Date    Cystocele with rectocele     Diabetes mellitus     Goiter     Hashimoto's thyroiditis     Headache     Hypothyroidism     Mumps pancreatitis     Pancreatitis     Peptic ulceration     Thyroid nodule      Past Surgical History:   Procedure Laterality Date    BLADDER SUSPENSION      CHOLECYSTECTOMY      COLOSTOMY      CYSTOCELE REPAIR      HERNIA REPAIR      HYSTERECTOMY      OVARIAN CYST REMOVAL      RECTOCELE REPAIR      SMALL INTESTINE SURGERY      TONSILLECTOMY       PT Assessment (last 12 hours)       PT Evaluation and Treatment       Row Name 10/17/23 1310          Physical Therapy Time and Intention    Subjective Information complains of;weakness  -KJ     Document Type therapy note (daily note)  -KJ     Mode of Treatment physical therapy  -KJ     Patient Effort good  -KJ       Row Name 10/17/23 1310          General Information    Existing Precautions/Restrictions fall  R side weakness/ decreased motor planning and coordination  -KJ       Row Name 10/17/23 1310          Pain    Pretreatment Pain Rating 3/10  -KJ     Posttreatment Pain Rating 3/10  -KJ     Pain Location - Side/Orientation Right  -KJ     Pain Location lower  -KJ     Pain Location - back  -KJ       Row Name 10/17/23 1310          Bed Mobility    Comment, (Bed Mobility) in bathroom  -KJ       Row Name 10/17/23 1310          Sit-Stand Transfer    Sit-Stand [place] (Transfers) contact guard  -KJ     Assistive Device (Sit-Stand Transfers)  walker, front-wheeled  -KJ       Row Name 10/17/23 1310          Stand-Sit Transfer    Stand-Sit [place] (Transfers) verbal cues;contact guard  -KJ     Assistive Device (Stand-Sit Transfers) walker, front-wheeled  -KJ       Row Name 10/17/23 1310          Gait/Stairs (Locomotion)    [place] Level (Gait) verbal cues;contact guard;minimum assist (75% patient effort)  -KJ     Assistive Device (Gait) walker, front-wheeled  -KJ     Distance in Feet (Gait) 40' x 2  -KJ     Deviations/Abnormal Patterns (Gait) gait speed decreased;right sided deviations;stride length decreased;weight shifting decreased;ataxic  -KJ     Bilateral Gait Deviations heel strike decreased  -KJ       Row Name 10/17/23 1310          Motor Skills    Therapeutic Exercise aerobic  -KJ       Row Name 10/17/23 1310          Aerobic Exercise    Comment, Aerobic Exercise (Therapeutic Exercise) HEP given for BLE's strengthening  -KJ       Row Name 10/17/23 1310          Positioning and Restraints    Pre-Treatment Position in bed  -KJ     Post Treatment Position chair  -KJ     In Chair call light within reach  -KJ               User Key  (r) = Recorded By, (t) = Taken By, (c) = Cosigned By      Initials Name Provider Type    Bri Hoffman, PTA Physical Therapist Assistant                    Physical Therapy Education       Title: PT OT SLP Therapies (Done)       Topic: Physical Therapy (Done)       Point: Mobility training (Done)       Learning Progress Summary             Patient Acceptance, E,TB,D, LILIBETH,CARMEN,NR by  at 10/17/2023 1014    Comment: education re: purpose of PT/importance of activity, for safety/falls prevention, proper use of rwx w/ emphasis on gait sequencing, placement of rwx, hand placement w/ transitions, T/I AA HS and quad sets w/ return demo                         Point: Home exercise program (Done)       Learning Progress Summary             Patient Acceptance, E,TB,D, LILIBETH,CARMEN,NR by  at 10/17/2023 1014    Comment:  education re: purpose of PT/importance of activity, for safety/falls prevention, proper use of rwx w/ emphasis on gait sequencing, placement of rwx, hand placement w/ transitions, T/I AA HS and quad sets w/ return demo                         Point: Precautions (Done)       Learning Progress Summary             Patient Acceptance, E,TB,D, VU,DU,NR by  at 10/17/2023 1014    Comment: education re: purpose of PT/importance of activity, for safety/falls prevention, proper use of rwx w/ emphasis on gait sequencing, placement of rwx, hand placement w/ transitions, T/I AA HS and quad sets w/ return demo                                         User Key       Initials Effective Dates Name Provider Type Discipline     08/02/18 -  Tiffani York, PT Physical Therapist PT                  PT Recommendation and Plan             Time Calculation:    PT Charges       Row Name 10/17/23 1359 10/17/23 1016          Time Calculation    Start Time 1310  -KJ 0849  -     Stop Time 1333  -KJ 1015  -     Time Calculation (min) 23 min  -KJ 86 min  -     PT Received On -- 10/17/23  -     PT Goal Re-Cert Due Date -- 10/27/23  -        Time Calculation- PT    Total Timed Code Minutes- PT 23 minute(s)  -KJ --               User Key  (r) = Recorded By, (t) = Taken By, (c) = Cosigned By      Initials Name Provider Type    Bri Hoffman, PTA Physical Therapist Assistant    Tiffani Barajas, PT Physical Therapist                  Therapy Charges for Today       Code Description Service Date Service Provider Modifiers Qty    40001444727 HC GAIT TRAINING EA 15 MIN 10/17/2023 Bri Phillips, PTA GP 1    12591270225 HC PT THER PROC EA 15 MIN 10/17/2023 Bri Phillips, PTA GP 1            PT G-Codes  Outcome Measure Options: AM-PAC 6 Clicks Daily Activity (OT)  AM-PAC 6 Clicks Score (PT): 17  AM-PAC 6 Clicks Score (OT): 21    Bri Phillips PTA  10/17/2023

## 2023-10-17 NOTE — PLAN OF CARE
Goal Outcome Evaluation:  Plan of Care Reviewed With: patient        Progress: improving  Outcome Evaluation: PT eval completed.  Pt pleasant, oriented X 4, and agreeable to therapy w/ a very positive attitude.  Pt continues to report R LE weakness and numbness from chest down to R foot all on R side.  Noted mild facial flattening on R.  Pt however, does report her strength and sensation has improved some since yesterday.  Pt also reports hx of vertigo w/ treatment that has been helpful in the past.  Pt education to utilize a focal point w/ transitional mvmts to assist w/ mgmt of her vertigo.  Pt performed bed mobility w/ SBA to I, tfers w/ CGA w/ education for safe techniques, and performs gait w/ rwx w/ CGA.  1 LOB requiring assist to recover noting inconsistent placement of R LE w/ lack of control however intentional when taking steps.  Pt requires education for improved awareness of safety w/ functional mobility.  Pt would greatly benefit from short stay in acute rehab prior to returning home to further improve knowledge, strength and I w/ functional mobility working to reduce fall risk.  Will follow for progress and needs.      Anticipated Discharge Disposition (PT): inpatient rehabilitation facility

## 2023-10-17 NOTE — THERAPY EVALUATION
Patient Name: Charlene Bey  : 1955    MRN: [id number]                              Today's Date: 10/17/2023       Admit Date: 10/16/2023    Visit Dx:     ICD-10-CM ICD-9-CM   1. Gait abnormality [R26.9]  R26.9 781.2     Patient Active Problem List   Diagnosis    Paresthesia and pain of right extremity    Type 2 diabetes mellitus with hyperglycemia    Hypothyroidism (acquired)    Chronic pancreatitis    Nonintractable migraine    Dizziness, nonspecific     Past Medical History:   Diagnosis Date    Cystocele with rectocele     Diabetes mellitus     Goiter     Hashimoto's thyroiditis     Headache     Hypothyroidism     Mumps pancreatitis     Pancreatitis     Peptic ulceration     Thyroid nodule      Past Surgical History:   Procedure Laterality Date    BLADDER SUSPENSION      CHOLECYSTECTOMY      COLOSTOMY      CYSTOCELE REPAIR      HERNIA REPAIR      HYSTERECTOMY      OVARIAN CYST REMOVAL      RECTOCELE REPAIR      SMALL INTESTINE SURGERY      TONSILLECTOMY        General Information       Row Name 10/17/23 0857          OT Time and Intention    Document Type evaluation  C/o R leg numbness and tingling, had difficulty raising leg, Dx: Acute onset of RLE paresthesia that radiated to right trunk to about T7  -CS (r) MB (t) CS (c)     Mode of Treatment occupational therapy  -CS (r) MB (t) CS (c)       Row Name 10/17/23 0857          General Information    Patient Profile Reviewed yes  -CS (r) MB (t) CS (c)     Prior Level of Function independent:;ADL's;home management;cooking;cleaning;driving;shopping  -CS (r) MB (t) CS (c)     Existing Precautions/Restrictions fall  -CS (r) MB (t) CS (c)     Barriers to Rehab medically complex  -CS (r) MB (t) CS (c)       Row Name 10/17/23 0857          Occupational Profile    Environmental Supports and Barriers (Occupational Profile) tub shower, grab bars  -CS (r) MB (t) CS (c)       Row Name 10/17/23 0857          Living Environment    People in Home spouse  -CS (r) MB (t) CS  (c)       Row Name 10/17/23 0857          Home Main Entrance    Number of Stairs, Main Entrance none  -CS (r) MB (t) CS (c)       Row Name 10/17/23 0857          Stairs Within Home, Primary    Stair Railings, Within Home, Primary none  -CS (r) MB (t) CS (c)       Row Name 10/17/23 0857          Cognition    Orientation Status (Cognition) oriented x 4  -CS (r) MB (t) CS (c)       Row Name 10/17/23 0857          Safety Issues, Functional Mobility    Safety Issues Affecting Function (Mobility) insight into deficits/self-awareness;safety precaution awareness;at risk behavior observed;positioning of assistive device  -CS (r) MB (t) CS (c)     Impairments Affecting Function (Mobility) balance;endurance/activity tolerance;pain;strength  -CS (r) MB (t) CS (c)               User Key  (r) = Recorded By, (t) = Taken By, (c) = Cosigned By      Initials Name Provider Type    CS Veronica Barros S, OTR/L, CNT Occupational Therapist    Misha Rubalcava, OT Student OT Student                     Mobility/ADL's       Row Name 10/17/23 0857          Bed Mobility    Bed Mobility scooting/bridging;supine-sit  -CS (r) MB (t) CS (c)     Scooting/Bridging [place] (Bed Mobility) independent  -CS (r) MB (t) CS (c)     Supine-Sit [place] (Bed Mobility) independent  -CS (r) MB (t) CS (c)       Row Name 10/17/23 0857          Transfers    Transfers sit-stand transfer;stand-sit transfer  -CS (r) MB (t) CS (c)       Row Name 10/17/23 0857          Sit-Stand Transfer    Sit-Stand [place] (Transfers) contact guard;verbal cues  -CS (r) MB (t) CS (c)     Assistive Device (Sit-Stand Transfers) walker, front-wheeled  -CS (r) MB (t) CS (c)       Row Name 10/17/23 0857          Stand-Sit Transfer    Stand-Sit [place] (Transfers) contact guard;verbal cues  -CS (r) MB (t) CS (c)     Assistive Device (Stand-Sit Transfers) walker, front-wheeled  -CS (r) MB (t) CS (c)       Row Name 10/17/23 0857          Functional Mobility     Functional Mobility- Ind. Level contact guard assist;verbal cues required  -CS (r) MB (t) CS (c)     Functional Mobility- Device walker, front-wheeled  -CS (r) MB (t) CS (c)     Functional Mobility- Comment amb in srinivasan  -CS (r) MB (t) CS (c)       Row Name 10/17/23 0857          Activities of Daily Living    BADL Assessment/Intervention lower body dressing;toileting;grooming  -CS (r) MB (t) CS (c)       Row Name 10/17/23 0857          Lower Body Dressing Assessment/Training    [place] Level (Lower Body Dressing) don;doff;socks;independent  -CS (r) MB (t) CS (c)     Position (Lower Body Dressing) edge of bed sitting;unsupported sitting  -CS (r) MB (t) CS (c)       Row Name 10/17/23 0857          Toileting Assessment/Training    [place] Level (Toileting) toileting skills;contact guard assist;standby assist  -CS (r) MB (t) CS (c)     Assistive Devices (Toileting) commode  -CS (r) MB (t) CS (c)     Position (Toileting) supported standing  -CS (r) MB (t) CS (c)       Row Name 10/17/23 0857          Grooming Assessment/Training    [place] Level (Grooming) wash face, hands;contact guard assist  -CS (r) MB (t) CS (c)     Position (Grooming) sink side  -CS (r) MB (t) CS (c)               User Key  (r) = Recorded By, (t) = Taken By, (c) = Cosigned By      Initials Name Provider Type    CS Veronica Barros S, OTR/L, CNT Occupational Therapist    Misha Rubalcava, OT Student OT Student                   Obj/Interventions       Row Name 10/17/23 0857          Sensory Assessment (Somatosensory)    Sensory Assessment (Somatosensory) UE sensation intact  -CS (r) MB (t) CS (c)       Row Name 10/17/23 0857          Vision Assessment/Intervention    Visual Impairment/Limitations --  -CS (r) MB (t) CS (c)       Row Name 10/17/23 0857          Range of Motion Comprehensive    General Range of Motion bilateral upper extremity ROM WNL  -CS (r) MB (t) CS (c)       Row Name 10/17/23 0857          Strength Comprehensive  (MMT)    Comment, General Manual Muscle Testing (MMT) Assessment L shoulder: 4/5, R shoulder: 4+/5, R/L elbow flex/ext: 5/5  -CS (r) MB (t) CS (c)       Row Name 10/17/23 0857          Motor Skills    Motor Skills coordination  -CS (r) MB (t) CS (c)     Coordination finger to nose;WNL  -CS (r) MB (t) CS (c)       Row Name 10/17/23 0857          Balance    Balance Assessment sitting static balance;sitting dynamic balance;standing static balance;standing dynamic balance  -CS (r) MB (t) CS (c)     Static Sitting Balance independent  -CS (r) MB (t) CS (c)     Dynamic Sitting Balance independent  -CS (r) MB (t) CS (c)     Position, Sitting Balance unsupported;sitting edge of bed  -CS (r) MB (t) CS (c)     Static Standing Balance contact guard  -CS (r) MB (t) CS (c)     Dynamic Standing Balance contact guard  -CS (r) MB (t) CS (c)     Position/Device Used, Standing Balance supported;walker, front-wheeled  -CS (r) MB (t) CS (c)               User Key  (r) = Recorded By, (t) = Taken By, (c) = Cosigned By      Initials Name Provider Type    CS Veronica Barros, OTR/L, CNT Occupational Therapist    Misha Rubalcava, OT Student OT Student                   Goals/Plan       Row Name 10/17/23 0857          Transfer Goal 1 (OT)    Activity/Assistive Device (Transfer Goal 1, OT) toilet  -CS (r) MB (t) CS (c)     [place] Level/Cues Needed (Transfer Goal 1, OT) supervision required  -CS (r) MB (t) CS (c)     Time Frame (Transfer Goal 1, OT) long term goal (LTG)  -CS (r) MB (t) CS (c)     Progress/Outcome (Transfer Goal 1, OT) new goal  -CS (r) MB (t) CS (c)       Row Name 10/17/23 0857          Dressing Goal 1 (OT)    Activity/Device (Dressing Goal 1, OT) lower body dressing  -CS (r) MB (t) CS (c)     [place]/Cues Needed (Dressing Goal 1, OT) supervision required  -CS (r) MB (t) CS (c)     Time Frame (Dressing Goal 1, OT) short term goal (STG)  -CS (r) MB (t) CS (c)     Progress/Outcome (Dressing Goal 1, OT) new goal   -CS (r) MB (t) CS (c)       Row Name 10/17/23 0857          Grooming Goal 1 (OT)    Activity/Device (Grooming Goal 1, OT) grooming skills, all  sink side  -CS (r) MB (t) CS (c)     [place] (Grooming Goal 1, OT) supervision required  -CS (r) MB (t) CS (c)     Time Frame (Grooming Goal 1, OT) short term goal (STG)  -CS (r) MB (t) CS (c)     Progress/Outcome (Grooming Goal 1, OT) new goal  -CS (r) MB (t) CS (c)       Row Name 10/17/23 0857          Therapy Assessment/Plan (OT)    Planned Therapy Interventions (OT) activity tolerance training;BADL retraining;functional balance retraining;IADL retraining;occupation/activity based interventions;patient/caregiver education/training;strengthening exercise;transfer/mobility retraining  -CS (r) MB (t) CS (c)               User Key  (r) = Recorded By, (t) = Taken By, (c) = Cosigned By      Initials Name Provider Type    CS Veronica Barros S, OTR/L, CNT Occupational Therapist    Misha Rubalcava, OT Student OT Student                   Clinical Impression       Row Name 10/17/23 0857          Pain Assessment    Pretreatment Pain Rating 3/10  -CS (r) MB (t) CS (c)     Posttreatment Pain Rating 0/10 - no pain  -CS (r) MB (t) CS (c)     Pain Location lower  -CS (r) MB (t) CS (c)     Pain Location - back  -CS (r) MB (t) CS (c)     Pain Intervention(s) Repositioned  -CS (r) MB (t) CS (c)       Row Name 10/17/23 0857          Plan of Care Review    Plan of Care Reviewed With patient  -CS (r) MB (t) CS (c)     Progress improving  -CS (r) MB (t) CS (c)     Outcome Evaluation OT eval completed. Pt is alert and oriented x4 and c/o 3/10 lower back pain. Pt IND completed bed mobility and CGA for t/f's. Pt has vertigo and reports slight dizziness coming to sit EOB. She does attend vestibular rehab to address her vertigo. Pt amb to BR with CGA utilizing FWW to help maintain balance. She completed toileting task and grooming task with SBA/CGA. Pt then amb in hallway with CGA.  During amb, pt R knee buckled d/t weakness and she almost fell so amb back to chair. Pt self-recovered.  Pt would benefit from OT services to address fxl mobility, balance, and endurance with ADLs/IADLs. Recommend d/c to IP rehab.  -CS (r) MB (t) CS (c)       Row Name 10/17/23 0857          Therapy Assessment/Plan (OT)    Patient/Family Therapy Goal Statement (OT) to go home  -CS (r) MB (t) CS (c)     Rehab Potential (OT) good, to achieve stated therapy goals  -CS (r) MB (t) CS (c)     Criteria for Skilled Therapeutic Interventions Met (OT) meets criteria;skilled treatment is necessary  -CS (r) MB (t) CS (c)     Therapy Frequency (OT) 3 times/wk  -CS (r) MB (t) CS (c)     Predicted Duration of Therapy Intervention (OT) until hospital d/c  -CS (r) MB (t) CS (c)       Row Name 10/17/23 0857          Therapy Plan Review/Discharge Plan (OT)    Anticipated Discharge Disposition (OT) inpatient rehabilitation facility  -CS (r) MB (t) CS (c)       Row Name 10/17/23 0857          Positioning and Restraints    Pre-Treatment Position in bed  -CS (r) MB (t) CS (c)     Post Treatment Position chair  -CS (r) MB (t) CS (c)     In Chair call light within reach;encouraged to call for assist;legs elevated  -CS (r) MB (t) CS (c)               User Key  (r) = Recorded By, (t) = Taken By, (c) = Cosigned By      Initials Name Provider Type    CS Veronica Barros, OTR/L, CNT Occupational Therapist    Misha Rubalcava, OT Student OT Student                   Outcome Measures       Row Name 10/17/23 0857          How much help from another is currently needed...    Putting on and taking off regular lower body clothing? 3  -CS (r) MB (t) CS (c)     Bathing (including washing, rinsing, and drying) 3  -CS (r) MB (t) CS (c)     Toileting (which includes using toilet bed pan or urinal) 3  -CS (r) MB (t) CS (c)     Putting on and taking off regular upper body clothing 4  -CS (r) MB (t) CS (c)     Taking care of personal grooming (such as  brushing teeth) 4  -CS (r) MB (t) CS (c)     Eating meals 4  -CS (r) MB (t) CS (c)     AM-PAC 6 Clicks Score (OT) 21  -CS (r) MB (t)       Row Name 10/17/23 0849 10/17/23 0757       How much help from another person do you currently need...    Turning from your back to your side while in flat bed without using bedrails? 3  -JE 4  -KP    Moving from lying on back to sitting on the side of a flat bed without bedrails? 3  -JE 4  -KP    Moving to and from a bed to a chair (including a wheelchair)? 3  -JE 3  -KP    Standing up from a chair using your arms (e.g., wheelchair, bedside chair)? 3  -JE 3  -KP    Climbing 3-5 steps with a railing? 2  -JE 2  -KP    To walk in hospital room? 3  -JE 3  -KP    AM-PAC 6 Clicks Score (PT) 17  -JE 19  -KP    Highest level of mobility 5 --> Static standing  -JE 6 --> Walked 10 steps or more  -KP      Row Name 10/17/23 0400          How much help from another person do you currently need...    Turning from your back to your side while in flat bed without using bedrails? 4  -SB     Moving from lying on back to sitting on the side of a flat bed without bedrails? 4  -SB     Moving to and from a bed to a chair (including a wheelchair)? 3  -SB     Standing up from a chair using your arms (e.g., wheelchair, bedside chair)? 3  -SB     Climbing 3-5 steps with a railing? 2  -SB     To walk in hospital room? 3  -SB     AM-PAC 6 Clicks Score (PT) 19  -SB     Highest level of mobility 6 --> Walked 10 steps or more  -SB       Row Name 10/17/23 0857 10/17/23 0849       Functional Assessment    Outcome Measure Options AM-PAC 6 Clicks Daily Activity (OT)  -CS (r) MB (t) CS (c) AM-PAC 6 Clicks Basic Mobility (PT)  -JE              User Key  (r) = Recorded By, (t) = Taken By, (c) = Cosigned By      Initials Name Provider Type    Veronica Robledo, OTR/L, CNT Occupational Therapist    Willie Ellis, RN Registered Nurse    Tiffani Barajas, PT Physical Therapist    Misha Rubalcava, OT  Student OT Student    Carlos Alberto Beck, RN Registered Nurse                    Occupational Therapy Education       Title: PT OT SLP Therapies (Done)       Topic: Occupational Therapy (Done)       Point: ADL training (Done)       Description:   Instruct learner(s) on proper safety adaptation and remediation techniques during self care or transfers.   Instruct in proper use of assistive devices.                  Learning Progress Summary             Patient Acceptance, E, VU by MB at 10/17/2023 1007                         Point: Home exercise program (Done)       Description:   Instruct learner(s) on appropriate technique for monitoring, assisting and/or progressing therapeutic exercises/activities.                  Learning Progress Summary             Patient Acceptance, E, VU by MB at 10/17/2023 1007                         Point: Precautions (Done)       Description:   Instruct learner(s) on prescribed precautions during self-care and functional transfers.                  Learning Progress Summary             Patient Acceptance, E, VU by MB at 10/17/2023 1007                         Point: Body mechanics (Done)       Description:   Instruct learner(s) on proper positioning and spine alignment during self-care, functional mobility activities and/or exercises.                  Learning Progress Summary             Patient Acceptance, E, VU by MB at 10/17/2023 1007                                         User Key       Initials Effective Dates Name Provider Type Discipline    MB 08/04/23 -  Misha Smith OT Student OT Student OT                  OT Recommendation and Plan  Planned Therapy Interventions (OT): activity tolerance training, BADL retraining, functional balance retraining, IADL retraining, occupation/activity based interventions, patient/caregiver education/training, strengthening exercise, transfer/mobility retraining  Therapy Frequency (OT): 3 times/wk  Plan of Care Review  Plan of Care  Reviewed With: patient  Progress: improving  Outcome Evaluation: OT eval completed. Pt is alert and oriented x4 and c/o 3/10 lower back pain. Pt IND completed bed mobility and CGA for t/f's. Pt has vertigo and reports slight dizziness coming to sit EOB. She does attend vestibular rehab to address her vertigo. Pt amb to BR with CGA utilizing FWW to help maintain balance. She completed toileting task and grooming task with SBA/CGA. Pt then amb in hallway with CGA. During amb, pt R knee buckled d/t weakness and she almost fell so amb back to chair. Pt self-recovered.  Pt would benefit from OT services to address fxl mobility, balance, and endurance with ADLs/IADLs. Recommend d/c to IP rehab.     Time Calculation:         Time Calculation- OT       Row Name 10/17/23 0857             Time Calculation- OT    OT Start Time 0857  +5 min chart review  -CS (r) MB (t) CS (c)      OT Stop Time 0945  -CS (r) MB (t) CS (c)      OT Time Calculation (min) 48 min  -CS (r) MB (t)      OT Received On 10/17/23  -CS (r) MB (t) CS (c)         Untimed Charges    OT Eval/Re-eval Minutes 53  -CS (r) MB (t) CS (c)         Total Minutes    Untimed Charges Total Minutes 53  -CS (r) MB (t)       Total Minutes 53  -CS (r) MB (t)                User Key  (r) = Recorded By, (t) = Taken By, (c) = Cosigned By      Initials Name Provider Type    CS Veronica Barros, OTR/L, CNT Occupational Therapist    Misha Rubalcava, OT Student OT Student                           Misha Smith, OT Student  10/17/2023

## 2023-10-17 NOTE — PROGRESS NOTES
Neurology Progress Note      Chief Complaint:  f/u RLE weakness and numbness  Length of Stay:  0   Subjective     Subjective:  Patient lying in bed. No family at bedside. Patient feels like RLE weakness improving and has some return of sensation right hamstring. Patient states HA improved and had attributed HA last PM to lack of caffeine as she did not have any coffee yesterday.     MRI TS showed no acute finding. MRI LS showed moderate multlilevel degenerative changes and 8 mm anterolisthesis osf L5 on S1. Patient does report some intermittent low back pain at times.     Medications:  Current Facility-Administered Medications   Medication Dose Route Frequency Provider Last Rate Last Admin    acetaminophen (TYLENOL) tablet 650 mg  650 mg Oral Q4H PRN Jose Avery MD   650 mg at 10/16/23 2309    Or    acetaminophen (TYLENOL) 160 MG/5ML oral solution 650 mg  650 mg Oral Q4H PRN Jose Avery MD        Or    acetaminophen (TYLENOL) suppository 650 mg  650 mg Rectal Q4H PRN Jose Avery MD        sennosides-docusate (PERICOLACE) 8.6-50 MG per tablet 2 tablet  2 tablet Oral BID Jose Avery MD   2 tablet at 10/17/23 0815    And    polyethylene glycol (MIRALAX) packet 17 g  17 g Oral Daily PRN Jose Avery MD        And    bisacodyl (DULCOLAX) EC tablet 5 mg  5 mg Oral Daily PRN Jose Avery MD        And    bisacodyl (DULCOLAX) suppository 10 mg  10 mg Rectal Daily PRN Jose Avery MD        calcium carbonate (TUMS) chewable tablet 500 mg (200 mg elemental)  1 tablet Oral BID PRN Jose Avery MD        Calcium Replacement - Follow Nurse / BPA Driven Protocol   Does not apply PRN Jose Avery MD        dextrose (D50W) (25 g/50 mL) IV injection 25 g  25 g Intravenous Q15 Min PRN Jose Avery MD        dextrose (GLUTOSE) oral gel 15 g  15 g Oral Q15 Min PRN Jose Avery MD        glucagon (GLUCAGEN) injection 1 mg  1 mg Intramuscular Q15 Min PRN  Jose Avery MD        HYDROcodone-acetaminophen (NORCO) 7.5-325 MG per tablet 1 tablet  1 tablet Oral Q6H PRN Jose Avery MD        insulin detemir (LEVEMIR) injection 10 Units  10 Units Subcutaneous Nightly Jose Avery MD   10 Units at 10/16/23 2040    Insulin Lispro (humaLOG) injection 3-14 Units  3-14 Units Subcutaneous 4x Daily AC & at Bedtime Jose Avery MD   3 Units at 10/17/23 0818    lactobacillus acidophilus (RISAQUAD) capsule 1 capsule  1 capsule Oral Daily Jose Avery MD   1 capsule at 10/17/23 0814    levothyroxine (SYNTHROID, LEVOTHROID) tablet 175 mcg  175 mcg Oral Q AM Jose Avery MD   175 mcg at 10/17/23 0618    magnesium (as) gluconate (MAGONATE) tablet 27 mg  27 mg Oral BID Jose Avery MD   27 mg at 10/17/23 0814    Magnesium Standard Dose Replacement - Follow Nurse / BPA Driven Protocol   Does not apply PRN Jose Avery MD        melatonin tablet 5 mg  5 mg Oral Nightly PRN Jose Avery MD   5 mg at 10/16/23 2309    methocarbamol (ROBAXIN) tablet 500 mg  500 mg Oral Daily PRN Jose Avery MD   500 mg at 10/16/23 2143    montelukast (SINGULAIR) tablet 10 mg  10 mg Oral Nightly Jose Avery MD   10 mg at 10/16/23 2040    nitroglycerin (NITROSTAT) SL tablet 0.4 mg  0.4 mg Sublingual Q5 Min PRN Jose Avery MD        ondansetron (ZOFRAN) tablet 4 mg  4 mg Oral Q6H PRN Jose Avery MD        Or    ondansetron (ZOFRAN) injection 4 mg  4 mg Intravenous Q6H PRN Jose Avery MD        pancrelipase (Lip-Prot-Amyl) (CREON) capsule 12,000 units of lipase  12,000 units of lipase Oral TID With Meals Jose Avery MD   12,000 units of lipase at 10/17/23 0815    Phosphorus Replacement - Follow Nurse / BPA Driven Protocol   Does not apply PRJose Prince MD        Potassium Replacement - Follow Nurse / BPA Driven Protocol   Does not apply PRJose Prince MD        sertraline (ZOLOFT)  tablet 100 mg  100 mg Oral Daily Jose Avery MD   100 mg at 10/17/23 0814    sodium chloride 0.9 % flush 10 mL  10 mL Intravenous Q12H Jose Avery MD   10 mL at 10/17/23 0818    sodium chloride 0.9 % flush 10 mL  10 mL Intravenous PRN Jose Avery MD        sodium chloride 0.9 % infusion 40 mL  40 mL Intravenous PRN Jose Avery MD        vitamin B-12 (CYANOCOBALAMIN) tablet 1,000 mcg  1,000 mcg Oral Daily Jose Avery MD   1,000 mcg at 10/17/23 0814    vitamin D3 capsule 5,000 Units  5,000 Units Oral Daily Jose Avery MD   5,000 Units at 10/17/23 0814             Objective      Vital Signs  Temp:  [97.9 °F (36.6 °C)-98.6 °F (37 °C)] 98.1 °F (36.7 °C)  Heart Rate:  [53-60] 55  Resp:  [16-18] 16  BP: (110-124)/(54-63) 121/61    Telemetry SB - S 49-73    Physical Exam:    General Exam:  Head:  Normocephalic, atraumatic  HEENT:  Neck supple  Fundoscopic Exam:  No signs of disc edema  CVS:  Regular rate and rhythm.  No murmurs  Carotid Examination:  No bruits  Lungs:  Clear to auscultation  Abdomen:  Nontender, nondistended  Extremities:  No signs of peripheral edema  Skin:  No rashes    Neurologic Exam:    Mental Status:    -Alert, Oriented X 3  -No word-finding difficulties  -No aphasia  -No dysarthria  -Follows simple and complex commands    CN II:  Visual fields full.  Pupils equally reactive to light  CN III, IV, VI:  Extraocular Muscles full with no signs of nystagmus  CN V:  Facial sensory is symmetric with no asymmetries.  CN VII:  Facial motor symmetric  CN VIII:  Gross hearing intact bilaterally  CN IX:  Palate elevates symmetrically  CN X:  Palate elevates symmetrically  CN XI:  Shoulder shrug symmetric  CN XII:  Tongue protrudes to midline  Motor: (strength out of 5:  1= minimal movement, 2 = movement in plane of gravity, 3 = movement against gravity, 4 = movement against some resistance, 5 = full strength)    -Right Upper Ext: Proximal: 5 Distal: 5  -Left Upper  Ext: Proximal: 5 Distal: 5    -Right Lower Ext: Proximal: 3 Distal: 3 1/5 plantar and dorsiflexion  -Left Lower Ext: Proximal: 5 Distal: 5    DTR:  -Right   Bicep: 2+ Tricep: 2+ Brachoradialis: 2+   Patella: 2+ Ankle: 1+ Neg Babinski  -Left   Bicep: 2+ Tricep: 2+ Brachoradialis: 2+   Patella: 2+ Ankle: 2+ Neg Babinski    Sensory:  -Intact to light touch, pinprick, temperature, pain, and proprioception    Coordination:  -Finger to nose intact  -Heel to shin intact      Gait  -up with assist.      Results Review:      Labs:    A1c 7.8  THS 4.27  T4 0.96  B12 571  ESR 6  CRP <0.3      Imaging:  MRI Thoracic Spine With & Without Contrast    Result Date: 10/16/2023  1.  No acute findings. 2.  Mild multilevel degenerative change. 3.  Large hiatal hernia.   This report was signed and finalized on 10/16/2023 9:04 PM CDT by Dr. Luis Chawla MD.      MRI Lumbar Spine With & Without Contrast    Result Date: 10/16/2023   1.  No acute osseous findings. 2.  8 mm anterolisthesis of L5 on S1. 3.  Moderate multilevel degenerative change, as described above. No area of severe central canal stenosis. There is moderate right sided neural foraminal stenosis at L4-L5 and L5-S1 with disc/osteophyte material closely approximating and likely abutting the exiting right L4 and L5 nerve roots.   This report was signed and finalized on 10/16/2023 7:56 PM CDT by Dr. Luis Chawla MD.        Assessment/Plan     Hospital Problem List      Paresthesia and pain of right extremity    Type 2 diabetes mellitus with hyperglycemia    Hypothyroidism (acquired)    Chronic pancreatitis    Nonintractable migraine    Dizziness, nonspecific    Impression:  Acute onset of right lower extremity paresthesia that radiated to right trunk to about T7 with RLE weakness. Findings on MRI TS and LS likely not etiology of symptoms. Would like to consider Brown Sequard syndrome.   Covid + on 10/11/2023  DM uncontrolled with hyperglycemia. A1C 7.8  Thyroid  disorder  HLD.     Plan:  MRI brain with and w/o today  Will consider additional work up after #1.  OT/PT  A1C goal less than 6.5  Lipitor 80 mg for LDL goal less than 70.  Serum NMO.         Medical Decision Making    Number/Complexity of Problems  Moderate  1 undiagnosed new problem with uncertain prognosis -   1 acute illness with systemic symptoms -   High  1 acute or chronic illness that pose a threat to life/body function -   high     MDM Data  Moderate - 1/3 categories  Extensive - 2/3 categories    Category 1: 3 of the following  Review of external notes  Review of results  Ordering of each unique test  Independent historian  Category 2:  Independent interpretation of test (ex: imaging)  Category 3:  Discussion of management with another provider    extensive     Treatment Plan  Moderate - Prescription Drug management  High  Drug therapy requiring intensive monitoring for toxicity  Decision regarding hospitalization or escalation of care  De-escalate care/DNR decisions  high Sherlyn Fernandes, APRN  10/17/23  08:43 CDT

## 2023-10-17 NOTE — PLAN OF CARE
Goal Outcome Evaluation:  Plan of Care Reviewed With: patient        Progress: improving  Outcome Evaluation: Pt alert and oriented x4, denies pain. Pt ambulating in room with assistance and walker.Monitor Blood glucose.  at bedside. Continue to monitor.

## 2023-10-17 NOTE — PLAN OF CARE
Goal Outcome Evaluation:              Outcome Evaluation: Pt ao x 4, adx from the ER for sudden onset of weakness and numbness to RLE. Pt assit x 1 with walker to bathroom. states RLE appears to be improving with multiple trips to the bathroom. Denies any pain, muscle relaxer and melatoninadministered at bedtime, per pt's request. SCD to BLE. IV intact to RUE. C/o HA @ bedtme, tylenol 650 mg administered. Pt on tele, kris sinus shadi (50s - 57s). No new issues noted at this time.

## 2023-10-18 LAB
ALBUMIN SERPL-MCNC: 4.4 G/DL (ref 3.5–5.2)
ALBUMIN/GLOB SERPL: 1.5 G/DL
ALP SERPL-CCNC: 71 U/L (ref 39–117)
ALT SERPL W P-5'-P-CCNC: 15 U/L (ref 1–33)
ANION GAP SERPL CALCULATED.3IONS-SCNC: 9 MMOL/L (ref 5–15)
AST SERPL-CCNC: 15 U/L (ref 1–32)
BASOPHILS # BLD AUTO: 0.03 10*3/MM3 (ref 0–0.2)
BASOPHILS NFR BLD AUTO: 0.4 % (ref 0–1.5)
BILIRUB SERPL-MCNC: 0.2 MG/DL (ref 0–1.2)
BUN SERPL-MCNC: 15 MG/DL (ref 8–23)
BUN/CREAT SERPL: 23.1 (ref 7–25)
CALCIUM SPEC-SCNC: 9.8 MG/DL (ref 8.6–10.5)
CHLORIDE SERPL-SCNC: 103 MMOL/L (ref 98–107)
CO2 SERPL-SCNC: 27 MMOL/L (ref 22–29)
CREAT SERPL-MCNC: 0.65 MG/DL (ref 0.57–1)
DEPRECATED RDW RBC AUTO: 44.8 FL (ref 37–54)
EGFRCR SERPLBLD CKD-EPI 2021: 96.6 ML/MIN/1.73
EOSINOPHIL # BLD AUTO: 0.11 10*3/MM3 (ref 0–0.4)
EOSINOPHIL NFR BLD AUTO: 1.4 % (ref 0.3–6.2)
ERYTHROCYTE [DISTWIDTH] IN BLOOD BY AUTOMATED COUNT: 14.8 % (ref 12.3–15.4)
GLOBULIN UR ELPH-MCNC: 2.9 GM/DL
GLUCOSE BLDC GLUCOMTR-MCNC: 143 MG/DL (ref 70–130)
GLUCOSE BLDC GLUCOMTR-MCNC: 166 MG/DL (ref 70–130)
GLUCOSE BLDC GLUCOMTR-MCNC: 177 MG/DL (ref 70–130)
GLUCOSE BLDC GLUCOMTR-MCNC: 202 MG/DL (ref 70–130)
GLUCOSE SERPL-MCNC: 189 MG/DL (ref 65–99)
HCT VFR BLD AUTO: 39.9 % (ref 34–46.6)
HGB BLD-MCNC: 12.9 G/DL (ref 12–15.9)
IMM GRANULOCYTES # BLD AUTO: 0.02 10*3/MM3 (ref 0–0.05)
IMM GRANULOCYTES NFR BLD AUTO: 0.2 % (ref 0–0.5)
LYMPHOCYTES # BLD AUTO: 2.97 10*3/MM3 (ref 0.7–3.1)
LYMPHOCYTES NFR BLD AUTO: 36.8 % (ref 19.6–45.3)
MCH RBC QN AUTO: 26.7 PG (ref 26.6–33)
MCHC RBC AUTO-ENTMCNC: 32.3 G/DL (ref 31.5–35.7)
MCV RBC AUTO: 82.6 FL (ref 79–97)
MONOCYTES # BLD AUTO: 0.56 10*3/MM3 (ref 0.1–0.9)
MONOCYTES NFR BLD AUTO: 6.9 % (ref 5–12)
NEUTROPHILS NFR BLD AUTO: 4.38 10*3/MM3 (ref 1.7–7)
NEUTROPHILS NFR BLD AUTO: 54.3 % (ref 42.7–76)
NRBC BLD AUTO-RTO: 0 /100 WBC (ref 0–0.2)
PLATELET # BLD AUTO: 187 10*3/MM3 (ref 140–450)
PMV BLD AUTO: 9.8 FL (ref 6–12)
POTASSIUM SERPL-SCNC: 3.9 MMOL/L (ref 3.5–5.2)
PROT SERPL-MCNC: 7.3 G/DL (ref 6–8.5)
RBC # BLD AUTO: 4.83 10*6/MM3 (ref 3.77–5.28)
SODIUM SERPL-SCNC: 139 MMOL/L (ref 136–145)
WBC NRBC COR # BLD: 8.07 10*3/MM3 (ref 3.4–10.8)

## 2023-10-18 PROCEDURE — 63710000001 INSULIN DETEMIR PER 5 UNITS: Performed by: STUDENT IN AN ORGANIZED HEALTH CARE EDUCATION/TRAINING PROGRAM

## 2023-10-18 PROCEDURE — 80053 COMPREHEN METABOLIC PANEL: CPT | Performed by: STUDENT IN AN ORGANIZED HEALTH CARE EDUCATION/TRAINING PROGRAM

## 2023-10-18 PROCEDURE — 82948 REAGENT STRIP/BLOOD GLUCOSE: CPT

## 2023-10-18 PROCEDURE — 99233 SBSQ HOSP IP/OBS HIGH 50: CPT | Performed by: PSYCHIATRY & NEUROLOGY

## 2023-10-18 PROCEDURE — 97535 SELF CARE MNGMENT TRAINING: CPT | Performed by: OCCUPATIONAL THERAPIST

## 2023-10-18 PROCEDURE — G0378 HOSPITAL OBSERVATION PER HR: HCPCS

## 2023-10-18 PROCEDURE — 97116 GAIT TRAINING THERAPY: CPT

## 2023-10-18 PROCEDURE — 97110 THERAPEUTIC EXERCISES: CPT

## 2023-10-18 PROCEDURE — 63710000001 INSULIN LISPRO (HUMAN) PER 5 UNITS: Performed by: STUDENT IN AN ORGANIZED HEALTH CARE EDUCATION/TRAINING PROGRAM

## 2023-10-18 PROCEDURE — 85025 COMPLETE CBC W/AUTO DIFF WBC: CPT | Performed by: STUDENT IN AN ORGANIZED HEALTH CARE EDUCATION/TRAINING PROGRAM

## 2023-10-18 RX ORDER — GABAPENTIN 300 MG/1
300 CAPSULE ORAL 3 TIMES DAILY
Status: DISCONTINUED | OUTPATIENT
Start: 2023-10-18 | End: 2023-10-20 | Stop reason: HOSPADM

## 2023-10-18 RX ADMIN — INSULIN LISPRO 5 UNITS: 100 INJECTION, SOLUTION INTRAVENOUS; SUBCUTANEOUS at 20:50

## 2023-10-18 RX ADMIN — DOCUSATE SODIUM 50 MG AND SENNOSIDES 8.6 MG 2 TABLET: 8.6; 5 TABLET, FILM COATED ORAL at 20:51

## 2023-10-18 RX ADMIN — Medication 10 ML: at 20:51

## 2023-10-18 RX ADMIN — INSULIN LISPRO 3 UNITS: 100 INJECTION, SOLUTION INTRAVENOUS; SUBCUTANEOUS at 11:41

## 2023-10-18 RX ADMIN — INSULIN DETEMIR 10 UNITS: 100 INJECTION, SOLUTION SUBCUTANEOUS at 20:50

## 2023-10-18 RX ADMIN — GABAPENTIN 300 MG: 300 CAPSULE ORAL at 20:50

## 2023-10-18 RX ADMIN — LEVOTHYROXINE SODIUM 175 MCG: 150 TABLET ORAL at 05:48

## 2023-10-18 RX ADMIN — GABAPENTIN 300 MG: 300 CAPSULE ORAL at 13:11

## 2023-10-18 RX ADMIN — POLYETHYLENE GLYCOL 3350 17 G: 17 POWDER, FOR SOLUTION ORAL at 16:49

## 2023-10-18 RX ADMIN — Medication 5000 UNITS: at 08:27

## 2023-10-18 RX ADMIN — PANCRELIPASE 12000 UNITS OF LIPASE: 60000; 12000; 38000 CAPSULE, DELAYED RELEASE PELLETS ORAL at 16:49

## 2023-10-18 RX ADMIN — Medication 5 MG: at 20:51

## 2023-10-18 RX ADMIN — DOCUSATE SODIUM 50 MG AND SENNOSIDES 8.6 MG 2 TABLET: 8.6; 5 TABLET, FILM COATED ORAL at 08:27

## 2023-10-18 RX ADMIN — MONTELUKAST 10 MG: 10 TABLET, FILM COATED ORAL at 20:50

## 2023-10-18 RX ADMIN — Medication 27 MG: at 20:50

## 2023-10-18 RX ADMIN — METHOCARBAMOL 500 MG: 500 TABLET, FILM COATED ORAL at 20:51

## 2023-10-18 RX ADMIN — PANCRELIPASE 12000 UNITS OF LIPASE: 60000; 12000; 38000 CAPSULE, DELAYED RELEASE PELLETS ORAL at 11:41

## 2023-10-18 RX ADMIN — Medication 1000 MCG: at 08:27

## 2023-10-18 RX ADMIN — Medication 1 CAPSULE: at 08:27

## 2023-10-18 RX ADMIN — PANCRELIPASE 12000 UNITS OF LIPASE: 60000; 12000; 38000 CAPSULE, DELAYED RELEASE PELLETS ORAL at 08:27

## 2023-10-18 RX ADMIN — ATORVASTATIN CALCIUM 80 MG: 40 TABLET ORAL at 20:50

## 2023-10-18 RX ADMIN — INSULIN LISPRO 3 UNITS: 100 INJECTION, SOLUTION INTRAVENOUS; SUBCUTANEOUS at 08:26

## 2023-10-18 RX ADMIN — SERTRALINE HYDROCHLORIDE 100 MG: 100 TABLET, FILM COATED ORAL at 08:27

## 2023-10-18 RX ADMIN — Medication 27 MG: at 08:27

## 2023-10-18 RX ADMIN — Medication 10 ML: at 08:29

## 2023-10-18 NOTE — PLAN OF CARE
Goal Outcome Evaluation:  Plan of Care Reviewed With: patient           Outcome Evaluation: PT reports R LE hypersensitivity particularly with pin point & light touch. She reports difficulty tolerating her leggings - the pants most closely available to her.  Pt. spouse provided denim jeans and OTR provided edu regarding LBD dressing techs to improve indep, clothing choice to reduce risk of noxious reaction with hypersensitivity.  Pt. elected & reported that sherlyn provided most comfort.  OTR edu'd spouse in safety techs with gait belt during fxl mob to provide pt. assist and reduce her risk of falls.  OTR also provided education on positioning to reduce pain.  Cont OT tx.      Anticipated Discharge Disposition (OT): inpatient rehabilitation facility

## 2023-10-18 NOTE — PLAN OF CARE
Goal Outcome Evaluation:  Plan of Care Reviewed With: patient, spouse        Progress: improving  Outcome Evaluation: Pt alert and oriented x4, denies pain and assist when out of bed with walker. Gait has improved slightly since yesterday. at bedside. Continue to monitor.

## 2023-10-18 NOTE — PLAN OF CARE
Goal Outcome Evaluation:  Plan of Care Reviewed With: patient        Progress: improving  Outcome Evaluation: Provided pt with healthy eating for diabetes written material and discussion today. Cont to follow for plan of care.

## 2023-10-18 NOTE — PLAN OF CARE
Goal Outcome Evaluation:  Plan of Care Reviewed With: patient        Progress: improving  Outcome Evaluation: PT tx completed. Pt reports she is moving RLE better and feels stronger. Although, her movement is improved she still presents with decreased motor control/coordination of RLE during gait. She is unable to ambulate without the use of an assistive device safely. High risk for falls. Recommend rehab or OP therapy.

## 2023-10-18 NOTE — PROGRESS NOTES
Neurology Progress Note      Chief Complaint:  f/u RLE weakness and numbness  Length of Stay:  0   Subjective     Subjective:  She is doing very well this morning.  She is sitting up at the bedside in a chair.  Her  is in the room.  She tells me that her right lower extremity has improved from a strength standpoint.  Unfortunately she is having some dysesthesia and  paresthesias.   This has kept her up for the last 2 nights and is extremely uncomfortable.    Medications:  Current Facility-Administered Medications   Medication Dose Route Frequency Provider Last Rate Last Admin    acetaminophen (TYLENOL) tablet 650 mg  650 mg Oral Q4H PRN Jose Avery MD   650 mg at 10/17/23 2219    Or    acetaminophen (TYLENOL) 160 MG/5ML oral solution 650 mg  650 mg Oral Q4H PRN Jose Avery MD        Or    acetaminophen (TYLENOL) suppository 650 mg  650 mg Rectal Q4H PRN Jose Avery MD        atorvastatin (LIPITOR) tablet 80 mg  80 mg Oral Nightly Sherlyn Fernandes APRN   80 mg at 10/17/23 2219    sennosides-docusate (PERICOLACE) 8.6-50 MG per tablet 2 tablet  2 tablet Oral BID Jose Avery MD   2 tablet at 10/18/23 0827    And    polyethylene glycol (MIRALAX) packet 17 g  17 g Oral Daily PRN Jose Avery MD        And    bisacodyl (DULCOLAX) EC tablet 5 mg  5 mg Oral Daily PRN Jose Avery MD        And    bisacodyl (DULCOLAX) suppository 10 mg  10 mg Rectal Daily PRN Jose Avery MD        calcium carbonate (TUMS) chewable tablet 500 mg (200 mg elemental)  1 tablet Oral BID PRN Jose Avery MD        Calcium Replacement - Follow Nurse / BPA Driven Protocol   Does not apply PRN Jose Avery MD        dextrose (D50W) (25 g/50 mL) IV injection 25 g  25 g Intravenous Q15 Min PRN Jose Avery MD        dextrose (GLUTOSE) oral gel 15 g  15 g Oral Q15 Min PRN Jose Avery MD        glucagon (GLUCAGEN) injection 1 mg  1 mg Intramuscular Q15 Min PRN  Jose Avery MD        HYDROcodone-acetaminophen (NORCO) 7.5-325 MG per tablet 1 tablet  1 tablet Oral Q6H PRN Jose Avery MD   1 tablet at 10/17/23 1444    insulin detemir (LEVEMIR) injection 10 Units  10 Units Subcutaneous Nightly Jose Avery MD   10 Units at 10/17/23 2220    Insulin Lispro (humaLOG) injection 3-14 Units  3-14 Units Subcutaneous 4x Daily AC & at Bedtime Jose Avery MD   3 Units at 10/18/23 0826    lactobacillus acidophilus (RISAQUAD) capsule 1 capsule  1 capsule Oral Daily Jose Avery MD   1 capsule at 10/18/23 0827    levothyroxine (SYNTHROID, LEVOTHROID) tablet 175 mcg  175 mcg Oral Q AM Jose Avery MD   175 mcg at 10/18/23 0548    magnesium (as) gluconate (MAGONATE) tablet 27 mg  27 mg Oral BID Jose Avery MD   27 mg at 10/18/23 0827    Magnesium Standard Dose Replacement - Follow Nurse / BPA Driven Protocol   Does not apply PRN Jose Avery MD        melatonin tablet 5 mg  5 mg Oral Nightly PRN Jose Avery MD   5 mg at 10/17/23 2219    methocarbamol (ROBAXIN) tablet 500 mg  500 mg Oral Daily PRN Jose Avery MD   500 mg at 10/17/23 2219    montelukast (SINGULAIR) tablet 10 mg  10 mg Oral Nightly Jose Avery MD   10 mg at 10/17/23 2219    nitroglycerin (NITROSTAT) SL tablet 0.4 mg  0.4 mg Sublingual Q5 Min PRN Jose Avery MD        ondansetron (ZOFRAN) tablet 4 mg  4 mg Oral Q6H PRN Jose Avery MD        Or    ondansetron (ZOFRAN) injection 4 mg  4 mg Intravenous Q6H PRN Jose Avery MD        pancrelipase (Lip-Prot-Amyl) (CREON) capsule 12,000 units of lipase  12,000 units of lipase Oral TID With Meals Jose Avery MD   12,000 units of lipase at 10/18/23 0827    Phosphorus Replacement - Follow Nurse / BPA Driven Protocol   Does not apply PRN Jose Avery MD        Potassium Replacement - Follow Nurse / BPA Driven Protocol   Does not apply PRJose Prince MD         sertraline (ZOLOFT) tablet 100 mg  100 mg Oral Daily Jose Avery MD   100 mg at 10/18/23 0827    sodium chloride 0.9 % flush 10 mL  10 mL Intravenous Q12H Jose Avery MD   10 mL at 10/18/23 0829    sodium chloride 0.9 % flush 10 mL  10 mL Intravenous PRN Jose Avery MD        sodium chloride 0.9 % infusion 40 mL  40 mL Intravenous PRN Jose Avery MD        vitamin B-12 (CYANOCOBALAMIN) tablet 1,000 mcg  1,000 mcg Oral Daily Jose Avery MD   1,000 mcg at 10/18/23 0827    vitamin D3 capsule 5,000 Units  5,000 Units Oral Daily Jose Avery MD   5,000 Units at 10/18/23 0827             Objective      Vital Signs  Temp:  [97.6 °F (36.4 °C)-98.7 °F (37.1 °C)] 97.6 °F (36.4 °C)  Heart Rate:  [50-62] 50  Resp:  [16-18] 16  BP: (119-134)/(52-72) 127/52    Telemetry SB - S 49-73    Physical Exam:    General Exam:  Head:  Normocephalic, atraumatic  HEENT:  Neck supple  Fundoscopic Exam:  No signs of disc edema  CVS:  Regular rate and rhythm.  No murmurs  Carotid Examination:  No bruits  Lungs:  Clear to auscultation  Abdomen:  Nontender, nondistended  Extremities:  No signs of peripheral edema  Skin:  No rashes    Neurologic Exam:    Mental Status:    -Alert, Oriented X 3  -No word-finding difficulties  -No aphasia  -No dysarthria  -Follows simple and complex commands    CN II:  Visual fields full.  Pupils equally reactive to light  CN III, IV, VI:  Extraocular Muscles full with no signs of nystagmus  CN V:  Facial sensory is symmetric with no asymmetries.  CN VII:  Facial motor symmetric  CN VIII:  Gross hearing intact bilaterally  CN IX:  Palate elevates symmetrically  CN X:  Palate elevates symmetrically  CN XI:  Shoulder shrug symmetric  CN XII:  Tongue protrudes to midline  Motor: (strength out of 5:  1= minimal movement, 2 = movement in plane of gravity, 3 = movement against gravity, 4 = movement against some resistance, 5 = full strength)    -Right Upper Ext: Proximal:  5 Distal: 5  -Left Upper Ext: Proximal: 5 Distal: 5    -Right Lower Ext: Proximal: 4 Distal: 4  -Left Lower Ext: Proximal: 5 Distal: 5    DTR:  -Right   Bicep: 2+ Tricep: 2+ Brachoradialis: 2+   Patella: 2+ Ankle: 1+ Neg Babinski  -Left   Bicep: 2+ Tricep: 2+ Brachoradialis: 2+   Patella: 2+ Ankle: 2+ Neg Babinski    Sensory:  Increased sensitivity to touch in RLE.      Coordination:  -Finger to nose intact  -Heel to shin intact      Gait  -up with assist.      Results Review:      Labs:    A1c 7.8  THS 4.27  T4 0.96  B12 571  ESR 6  CRP <0.3      Imaging:  MRI Brain With & Without Contrast    Result Date: 10/17/2023   No acute intracranial findings.  This report was signed and finalized on 10/17/2023 6:52 PM CDT by Dr. Luis Chawla MD.      MRI Thoracic Spine With & Without Contrast    Result Date: 10/16/2023  1.  No acute findings. 2.  Mild multilevel degenerative change. 3.  Large hiatal hernia.   This report was signed and finalized on 10/16/2023 9:04 PM CDT by Dr. Luis Chawla MD.      MRI Lumbar Spine With & Without Contrast    Result Date: 10/16/2023   1.  No acute osseous findings. 2.  8 mm anterolisthesis of L5 on S1. 3.  Moderate multilevel degenerative change, as described above. No area of severe central canal stenosis. There is moderate right sided neural foraminal stenosis at L4-L5 and L5-S1 with disc/osteophyte material closely approximating and likely abutting the exiting right L4 and L5 nerve roots.   This report was signed and finalized on 10/16/2023 7:56 PM CDT by Dr. Luis Chawla MD.        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  There are no active signs of inflammation with her inflammatory factors being  unremarkable.  There are no active signs of a myelitis on the MRI of her spine.  There is no evidence of an autoimmune disease in her brain.  There are no T2 flair abnormalities consistent with this.  She has no visual issues and therefore neuromyelitis optica is less likely.  I have offered her lumbar puncture to pursue further testing but she does not want to proceed with this at this time.  She is having drastic improvement with supportive care.  She wants to hold off for now.  We can Knik back to a lumbar puncture if she fails to reach her baseline or if she has any worsening of her neurologic symptoms.  I would like to give her a neuropathic agent to help with her symptomatic paresthesias.  Covid + on 10/11/2023  DM uncontrolled with hyperglycemia. A1C 7.8  Thyroid disorder  HLD.     Plan:  Follow serum NMO  We will start gabapentin 300 mg 3 times daily  Discharge planning will be acute rehabilitation        Medical Decision Making    Number/Complexity of Problems  Moderate  1 undiagnosed new problem with uncertain prognosis -   1 acute illness with systemic symptoms -   High  1 acute or chronic illness that pose a threat to life/body function -   high     MDM Data  Moderate - 1/3 categories  Extensive - 2/3 categories    Category 1: 3 of the following  Review of external notes  Review of results  Ordering of each unique test  Independent historian  Category 2:  Independent interpretation of test (ex: imaging)  Category 3:  Discussion of management with another provider    extensive     Treatment Plan  Moderate - Prescription Drug management  High  Drug therapy requiring intensive monitoring for toxicity  Decision regarding hospitalization or escalation of care  De-escalate care/DNR decisions  high       Cirilo Lynn MD  10/18/23  10:22 CDT

## 2023-10-18 NOTE — PROGRESS NOTES
[facility] Medicine Services  INPATIENT PROGRESS NOTE    Patient Name: Charlene Bey  Date of Admission: 10/16/2023  Today's Date: 10/18/23  Length of Stay: 0  Primary Care Physician: Alex Saravia APRN    Subjective   Chief Complaint: right leg numbness   HPI   Ms. Bey is a 67-year-old female who presented to [facility] on 10/16 for right lower extremity paresthesia.  Patient was COVID-positive on 10/11/2023.  She started Paxlovid and had improvement in symptoms.  On 10/16, she awoke in her usual state of health when she noted sudden numbness to right foot.  Numbness progressed to the entire right side to upper abdomen. She also noted right lower extremity weakness and had difficulty raising right leg. She also notes pain that begins in abdomen and radiates to her back similar to prior pancreatitis attacks.  She originally presented to [facility] emergency department. CT head was negative, CT abd/pelvis showed large hiatal hernia, moderate amount of stool, fat containing umbilical and supra umbilical hernia, left and right ingquinal hernias. amylase/lipase WNL. TSH 9.92. Dr. JOSE Overton discussed case with Dr. ELLE Lynn and patient accepted for transfer.     Sitting on the edge of the bed eating lunch with spouse at bedside. She feels her right lower extremity strength and sensation slowly continue to improve each day.   Right brain yesterday showed no acute findings.  Neurology suspects post-COVID plexitis.  She has worked with PT and OT.  Therapy recommends discharge to acute rehab.  Referral sent to both [place] and [facility] rehab, once bed offered and accepted, pre-CERT will be started.    Review of Systems   All pertinent negatives and positives are as above. All other systems have been reviewed and are negative unless otherwise stated.     Objective    Temp:  [97.6 °F (36.4 °C)-98.7 °F (37.1 °C)] 97.6 °F (36.4 °C)  Heart Rate:  [50-62] 50  Resp:  [16-18]  Spoke with wife. Informed her Dr. Collado is out of town until Monday. Message forwarded.   16  BP: (119-134)/(52-72) 127/52  Physical Exam  Vitals reviewed.   Constitutional:       General: She is not in acute distress.     Appearance: She is obese. She is not toxic-appearing.      Comments: Sitting on the edge of the bed eating lunch with spouse at bedside.  No acute distress.  On room air.   HENT:      Head: Normocephalic and atraumatic.      Mouth/Throat:      Mouth: Mucous membranes are moist.      Pharynx: Oropharynx is clear.   Eyes:      Extraocular Movements: Extraocular movements intact.      Conjunctiva/sclera: Conjunctivae normal.      Pupils: Pupils are equal, round, and reactive to light.   Cardiovascular:      Rate and Rhythm: Normal rate and regular rhythm.      Pulses: Normal pulses.   Pulmonary:      Effort: Pulmonary effort is normal. No respiratory distress.      Breath sounds: Normal breath sounds.   Abdominal:      General: Bowel sounds are normal. There is no distension.      Palpations: Abdomen is soft.      Tenderness: There is no abdominal tenderness.   Musculoskeletal:         General: No swelling or tenderness. Normal range of motion.      Cervical back: Normal range of motion and neck supple. No muscular tenderness.   Skin:     General: Skin is warm and dry.      Findings: No erythema or rash.   Neurological:      General: No focal deficit present.      Mental Status: She is alert and oriented to person, place, and time.      Sensory: Sensory deficit (She has numbness from the right side of her stomach down to her right leg.) present.      Motor: Weakness (RLE) present.      Gait: Gait abnormal.   Psychiatric:         Mood and Affect: Mood normal.         Behavior: Behavior normal.         Results Review:  I have reviewed the labs, radiology results, and diagnostic studies.    Laboratory Data:   Results from last 7 days   Lab Units 10/18/23  0350 10/17/23  0445 10/16/23  1551   WBC 10*3/mm3 8.07 8.26 8.11   HEMOGLOBIN g/dL 12.9 12.1 12.5   HEMATOCRIT % 39.9 38.1 38.6  "  PLATELETS 10*3/mm3 187 161 186        Results from last 7 days   Lab Units 10/18/23  0350 10/17/23  0445 10/16/23  1551   SODIUM mmol/L 139 140 139   POTASSIUM mmol/L 3.9 4.0 3.9   CHLORIDE mmol/L 103 105 104   CO2 mmol/L 27.0 28.0 27.0   BUN mg/dL 15 14 16   CREATININE mg/dL 0.65 0.76 0.75   CALCIUM mg/dL 9.8 9.1 9.2   BILIRUBIN mg/dL 0.2 0.2 0.2   ALK PHOS U/L 71 58 63   ALT (SGPT) U/L 15 14 16   AST (SGOT) U/L 15 13 15   GLUCOSE mg/dL 189* 153* 195*       Culture Data:   No results found for: \"ACANTHNAEG\", \"AFBCX\", \"BPERTUSSISCX\", \"BLOODCX\"  No results found for: \"BCIDPCR\", \"CXREFLEX\", \"CSFCX\", \"CULTURETIS\"  No results found for: \"CULTURES\", \"HSVCX\", \"URCX\"  No results found for: \"EYECULTURE\", \"GCCX\", \"HSVCULTURE\", \"LABHSV\"  No results found for: \"LEGIONELLA\", \"MRSACX\", \"MUMPSCX\", \"MYCOPLASCX\"  No results found for: \"NOCARDIACX\", \"STOOLCX\"  No results found for: \"THROATCX\", \"UNSTIMCULT\", \"URINECX\", \"CULTURE\", \"VZVCULTUR\"  No results found for: \"VIRALCULTU\", \"WOUNDCX\"    Radiology Data:   Imaging Results (Last 24 Hours)       Procedure Component Value Units Date/Time    MRI Brain With & Without Contrast [[id number]] Collected: 10/17/23 1847     Updated: 10/17/23 1856    Narrative:      EXAM/TECHNIQUE: MRI brain without and with IV contrast     INDICATION: RLE weakness and numbness; R26.9-Unspecified abnormalities  of gait and mobility     COMPARISON: None     FINDINGS:     No diffusion restriction to suggest acute infarction. No increased  susceptibility to suggest acute or chronic intracranial hemorrhage. The  major physiologic flow voids are maintained.     No abnormality of brain parenchyma signal intensity. No midline shift or  mass effect. Lateral ventricles are nondilated. Basilar cisterns are  patent. The sella turcica and its contents appear unremarkable.  Following contrast administration, no abnormal foci of enhancement are  identified.     No acute orbital finding. Small left mastoid effusion. " Paranasal sinuses  are clear.       Impression:         No acute intracranial findings.     This report was signed and finalized on 10/17/2023 6:52 PM CDT by Dr. Luis Chawla MD.               I have reviewed the patient's current medications.     Assessment/Plan   Assessment  Active Hospital Problems    Diagnosis     **Paresthesia and pain of right extremity     Type 2 diabetes mellitus with hyperglycemia     Hypothyroidism (acquired)     Chronic pancreatitis     Nonintractable migraine     Dizziness, nonspecific        Treatment Plan  Neurology, Dr. Lynn consulted. MRI TS showed no acute finding. MRI LS showed moderate multlilevel degenerative changes and 8 mm anterolisthesis osf L5 on S1.  MRI brain showed no acute findings.  Neurology suspects post-COVID plexitis.  She declines lumbar puncture.  She has had overall drastic improvement.  Dr. Lynn has started gabapentin 300 mg 3 times daily.    , continue high intensity statin.    Type 2 diabetes with A1c 7.8.  Continue Accu-Cheks with sliding scale insulin, Levemir.    Continue PT/OT.  Therapy recommends inpatient rehab.    SCDs for DVT prophylaxis.    Medical Decision Making  Number and Complexity of problems: 1 acute problem with high complexity.  Multiple chronic medical conditions  Differential Diagnosis: as above     Conditions and Status        Condition is improving.     [facility] Data  External documents reviewed: Epic records, facesheet and Care Everywhere  Cardiac tracing (EKG, telemetry) interpretation: EKG from outside facility shows sinus bradycardia.  Radiology interpretation: CT head, CT abdomen pelvis from outside facility reviewed  Labs reviewed: Labs from outside facility reviewed  Any tests that were considered but not ordered: N/A     Decision rules/scores evaluated (example [id number], [place], etc): N/A     Discussed with: Patient and her spouse.     Care Planning  Shared decision making: Patient is agreeable to ongoing work-up  and treatment  Code status and discussions: Full code with full interventions    Disposition  Social Determinants of Health that impact treatment or disposition: Referral sent to both [place] and [facility] rehab, once bed offered and accepted, pre-CERT will be started.  I expect the patient to be discharged to inpatient rehab in 1-2 days.     Electronically signed by GREGORIO Samaniego, 10/18/23, 10:39 CDT.

## 2023-10-18 NOTE — THERAPY TREATMENT NOTE
Acute Care - Physical Therapy Treatment Note  [facility]     Patient Name: Charlene Bey  : 1955  MRN: [id number]  Today's Date: 10/18/2023      Visit Dx:     ICD-10-CM ICD-9-CM   1. Gait abnormality [R26.9]  R26.9 781.2     Patient Active Problem List   Diagnosis    Paresthesia and pain of right extremity    Type 2 diabetes mellitus with hyperglycemia    Hypothyroidism (acquired)    Chronic pancreatitis    Nonintractable migraine    Dizziness, nonspecific     Past Medical History:   Diagnosis Date    Cystocele with rectocele     Diabetes mellitus     Goiter     Hashimoto's thyroiditis     Headache     Hypothyroidism     Mumps pancreatitis     Pancreatitis     Peptic ulceration     Thyroid nodule      Past Surgical History:   Procedure Laterality Date    BLADDER SUSPENSION      CHOLECYSTECTOMY      COLOSTOMY      CYSTOCELE REPAIR      HERNIA REPAIR      HYSTERECTOMY      OVARIAN CYST REMOVAL      RECTOCELE REPAIR      SMALL INTESTINE SURGERY      TONSILLECTOMY       PT Assessment (last 12 hours)       PT Evaluation and Treatment       Row Name 10/18/23 0829          Physical Therapy Time and Intention    Subjective Information --  states she does feel stronger today  -KJ     Document Type therapy note (daily note)  -KJ     Mode of Treatment physical therapy  -KJ     Patient Effort good  -KJ       Row Name 10/18/23 0806          General Information    Existing Precautions/Restrictions fall  RLE weakness; decreased motor control RLE  -KJ       Row Name 10/18/23 5956          Pain    Pretreatment Pain Rating 0/10 - no pain  -KJ     Posttreatment Pain Rating 0/10 - no pain  -KJ       Row Name 10/18/23 6447          Bed Mobility    Supine-Sit [place] (Bed Mobility) independent  -KJ       Row Name 10/18/23 0887          Sit-Stand Transfer    Sit-Stand [place] (Transfers) contact guard;verbal cues  -KJ     Assistive Device (Sit-Stand Transfers) walker, front-wheeled  -KJ       Row Name 10/18/23 3096           Stand-Sit Transfer    Stand-Sit [place] (Transfers) supervision  -KJ     Assistive Device (Stand-Sit Transfers) walker, front-wheeled  -KJ       Row Name 10/18/23 0839          Gait/Stairs (Locomotion)    [place] Level (Gait) verbal cues;minimum assist (75% patient effort)  -KJ     Distance in Feet (Gait) 10 steps without wx, required min/mod assist for balance. 30' with Rwx CG/Antonio  -KJ     Deviations/Abnormal Patterns (Gait) ataxic;gait speed decreased;steppage;stride length decreased;weight shifting decreased  -KJ     Right Sided Gait Deviations heel strike decreased;weight shift ability decreased  -KJ     Comment, (Gait/Stairs) continued decreased normal motor planning of RLE during gait. Decreased hip/knee flex unless over exaggerated. Unable safely to ambulate without the use of wx  -KJ       Row Name 10/18/23 0839          Balance    Balance Assessment sitting static balance  -KJ     Static Sitting Balance independent  -KJ     Dynamic Sitting Balance independent  -KJ     Position, Sitting Balance sitting edge of bed;unsupported  -KJ     Sit to Stand Dynamic Balance contact guard  -KJ     Static Standing Balance contact guard  -KJ     Dynamic Standing Balance minimal assist;moderate assist  -KJ     Position/Device Used, Standing Balance supported  -KJ       Row Name 10/18/23 0839          Aerobic Exercise    Comment, Aerobic Exercise (Therapeutic Exercise) standing hip flex,abd, perturbations no holding to on for support, tandem stance unable to hold. A & P weight shifts, lateral weight shifts.  -KJ       Row Name 10/18/23 0839          Positioning and Restraints    Pre-Treatment Position in bed  -KJ     Post Treatment Position chair  -KJ     In Bed call light within reach  -KJ               User Key  (r) = Recorded By, (t) = Taken By, (c) = Cosigned By      Initials Name Provider Type    Bri Hoffman, PTA Physical Therapist Assistant                    Physical Therapy Education        Title: PT OT SLP Therapies (Done)       Topic: Physical Therapy (Done)       Point: Mobility training (Done)       Learning Progress Summary             Patient Acceptance, E,TB,D, VU,DU,NR by  at 10/17/2023 1014    Comment: education re: purpose of PT/importance of activity, for safety/falls prevention, proper use of rwx w/ emphasis on gait sequencing, placement of rwx, hand placement w/ transitions, T/I AA HS and quad sets w/ return demo                         Point: Home exercise program (Done)       Learning Progress Summary             Patient Acceptance, E,TB,D, VU,DU,NR by  at 10/17/2023 1014    Comment: education re: purpose of PT/importance of activity, for safety/falls prevention, proper use of rwx w/ emphasis on gait sequencing, placement of rwx, hand placement w/ transitions, T/I AA HS and quad sets w/ return demo                         Point: Precautions (Done)       Learning Progress Summary             Patient Acceptance, E,TB,D, VU,DU,NR by  at 10/17/2023 1014    Comment: education re: purpose of PT/importance of activity, for safety/falls prevention, proper use of rwx w/ emphasis on gait sequencing, placement of rwx, hand placement w/ transitions, T/I AA HS and quad sets w/ return demo                                         User Key       Initials Effective Dates Name Provider Type Discipline     08/02/18 -  Tiffani York, PT Physical Therapist PT                  PT Recommendation and Plan     Plan of Care Reviewed With: patient  Progress: improving  Outcome Evaluation: PT tx completed. Pt reports she is moving RLE better and feels stronger. Although, her movement is improved she still presents with decreased motor control/coordination of RLE during gait. She is unable to ambulate without the use of an assistive device safely. High risk for falls. Recommend rehab or OP therapy.   Outcome Measures       Row Name 10/18/23 0900             How much help from another person do you  currently need...    Turning from your back to your side while in flat bed without using bedrails? 4  -KJ      Moving from lying on back to sitting on the side of a flat bed without bedrails? 4  -KJ      Moving to and from a bed to a chair (including a wheelchair)? 3  -KJ      Standing up from a chair using your arms (e.g., wheelchair, bedside chair)? 3  -KJ      Climbing 3-5 steps with a railing? 3  -KJ      To walk in hospital room? 3  -KJ      AM-PAC 6 Clicks Score (PT) 20  -KJ      Highest level of mobility 6 --> Walked 10 steps or more  -KJ         Functional Assessment    Outcome Measure Options AM-PAC 6 Clicks Basic Mobility (PT)  -KJ                User Key  (r) = Recorded By, (t) = Taken By, (c) = Cosigned By      Initials Name Provider Type    Bri Hoffman PTA Physical Therapist Assistant                     Time Calculation:    PT Charges       Row Name 10/18/23 0953             Time Calculation    Start Time 0839  -KJ      Stop Time 0910  -KJ      Time Calculation (min) 31 min  -KJ      PT Received On 10/18/23  -KJ      PT Goal Re-Cert Due Date 10/27/23  -KJ         Time Calculation- PT    Total Timed Code Minutes- PT 31 minute(s)  -KJ                User Key  (r) = Recorded By, (t) = Taken By, (c) = Cosigned By      Initials Name Provider Type    Bri Hoffman PTA Physical Therapist Assistant                  Therapy Charges for Today       Code Description Service Date Service Provider Modifiers Qty    27050128937 HC GAIT TRAINING EA 15 MIN 10/17/2023 Bri Phillips, PTA GP 1    45012664485 HC PT THER PROC EA 15 MIN 10/17/2023 Bri Phillips, PTA GP 1    29524241648 HC GAIT TRAINING EA 15 MIN 10/18/2023 Bri Phillips, PTA GP 1    18737255795 HC PT THER PROC EA 15 MIN 10/18/2023 Bri Phillips, PTA GP 1            PT G-Codes  Outcome Measure Options: AM-PAC 6 Clicks Basic Mobility (PT)  AM-PAC 6 Clicks Score (PT): 20  AM-PAC 6 Clicks Score (OT): 21    Bri Phillips  PTA  10/18/2023

## 2023-10-18 NOTE — CASE MANAGEMENT/SOCIAL WORK
Discharge Planning Assessment   [facility]     Patient Name: Charlene Bey  MRN: [id number]  Today's Date: 10/18/2023    Admit Date: 10/16/2023        Discharge Needs Assessment       Row Name 10/18/23 1235       Living Environment    People in Home spouse    Current Living Arrangements home    Potentially Unsafe Housing Conditions none    In the past 12 months has the electric, gas, oil, or water company threatened to shut off services in your home? No    Primary Care Provided by self    Provides Primary Care For no one    Family Caregiver if Needed spouse    Quality of Family Relationships helpful;involved    Able to Return to Prior Arrangements other (see comments)    Living Arrangement Comments Acute rehab       Resource/Environmental Concerns    Resource/Environmental Concerns none    Transportation Concerns none       Food Insecurity    Within the past 12 months, you worried that your food would run out before you got the money to buy more. Never true    Within the past 12 months, the food you bought just didn't last and you didn't have money to get more. Never true       Transition Planning    Patient/Family Anticipates Transition to inpatient rehabilitation facility    Patient/Family Anticipated Services at Transition rehabilitation services    Transportation Anticipated family or friend will provide       Discharge Needs Assessment    Readmission Within the Last 30 Days no previous admission in last 30 days    Equipment Currently Used at Home none    Concerns to be Addressed denies needs/concerns at this time    Anticipated Changes Related to Illness none    Equipment Needed After Discharge other (see comments)    Outpatient/Agency/Support Group Needs inpatient rehabilitation facility    Discharge Facility/Level of Care Needs rehabilitation facility    Provided Post Acute Provider List? Yes                   Discharge Plan       Row Name 10/18/23 0052       Plan    Plan Comments SW spoke to pt and spouse in  room regarding dc planning and recommendations for rehab. Pt is [facility] [place] [facility] Referrals sent [facility] both, await answers. Once bed offered and accepted, precert will be started.                  Continued Care and Services - Admitted Since 10/16/2023    Coordination has not been started for this encounter.          Demographic Summary    No documentation.                  Functional Status    No documentation.                  Psychosocial    No documentation.                  Abuse/Neglect    No documentation.                  Legal    No documentation.                  Substance Abuse    No documentation.                  Patient Forms    No documentation.                     FARHAN Chaudhry

## 2023-10-18 NOTE — DISCHARGE PLACEMENT REQUEST
"Charlene Trent (67 y.o. Female)       Date of Birth   1955    Social Security Number       Address   [address] [place] [address]    Home Phone   [phone]    MRN   [id number]       Restorationist   P[employer]alism    Marital Status                               Admission Date   10/16/23    Admission Type   Urgent    Admitting Provider   Jose Avery MD    Attending Provider   Jose Avery MD    Department, Room/Bed   [facility] 3A, 326/1       Discharge Date       Discharge Disposition       Discharge Destination                                 Attending Provider: Jose Avery MD    Allergies: Compazine [Prochlorperazine], Erythromycin, Ultram [Tramadol], Adhesive Tape    Isolation: None   Infection: None   Code Status: CPR    Ht: 170.2 cm (67\")   Wt: 87.5 kg (193 lb)    Admission Cmt: None   Principal Problem: Paresthesia and pain of right extremity [M79.609,R20.2]                   Active Insurance as of 10/16/2023       Primary Coverage       Payor Plan Insurance Group Employer/Plan Group    [employer] BLUE [employer] PPO [id number]       Payor Plan Address Payor Plan Phone Number Payor Plan Fax Number Effective Dates    [address] [phone]  1/2/2021 - None Entered    [place] [address]         Subscriber Name Subscriber Birth Date Member ID       DUONG TRENT SR [birth year]/14/1957 [id number]                     Emergency Contacts        (Rel.) Home Phone Work Phone Mobile Phone    DUONG TRENT (Spouse) [phone]              Insurance Information                  [employer] BL[employer] CROSS/[employer]UE [employer] BLUE [employer] PPO Phone: [phone]    Subscriber: Duong Trent Sr. Subscriber#: [id number]    Group#: [id number] Precert#: --          "

## 2023-10-18 NOTE — DISCHARGE PLACEMENT REQUEST
"Charlene Trent (67 y.o. Female)       Date of Birth   1955    Social Security Number       Address   [address] [place] [address]    Home Phone   [phone]    MRN   [id number]       Congregational   [employer]ism    Marital Status                               Admission Date   10/16/23    Admission Type   Urgent    Admitting Provider   Jose Avery MD    Attending Provider   Jose Avery MD    Department, Room/Bed   [facility] 3A, 326/1       Discharge Date       Discharge Disposition       Discharge Destination                                 Attending Provider: Jose Avery MD    Allergies: Compazine [Prochlorperazine], Erythromycin, Ultram [Tramadol], Adhesive Tape    Isolation: None   Infection: None   Code Status: CPR    Ht: 170.2 cm (67\")   Wt: 87.5 kg (193 lb)    Admission Cmt: None   Principal Problem: Paresthesia and pain of right extremity [M79.609,R20.2]                   Active Insurance as of 10/16/2023       Primary Coverage       Payor Plan Insurance Group Employer/Plan Group    [employer] ANTHEM BLUE CROSS BLUE SHIELD PPO [id number]       Payor Plan Address Payor Plan Phone Number Payor Plan Fax Number Effective Dates    [address] [phone]  1/2/2021 - None Entered    [place] [address]         Subscriber Name Subscriber Birth Date Member ID       DUONG TRENT SR 3/14/1957 [id number]                     Emergency Contacts        (Rel.) Home Phone Work Phone Mobile Phone    DUONG TRENT (Spouse) [phone]              Insurance Information                  ANTHEM BLUE CROSS/ANTHEM BLUE CROSS BLUE SHIELD PPO Phone: [phone]    Subscriber: Duong Trent Sr. Subscriber#: [id number]    Group#: [id number] Precert#: --             History & Physical        Jose Avery MD at 10/16/23 1433              [facility] Medicine Services  HISTORY AND " PHYSICAL    Date of Admission: 10/16/2023  Primary Care Physician: Alex Saravia APRN    Subjective   Primary Historian: Patient     Chief Complaint: right leg numbness    History of Present Illness  Patient is a 67-year-old female with history of hypothyroidism, type 2 diabetes, chronic pancreatitis, migraine headaches, dizziness who was transferred from [facility] due to acute right lower extremity numbness and tingling sensation affecting her gait.  Patient did have a CT of the head completed at the facility that did not show any acute process.  CT abdomen pelvis from outside facility showed hepatic steatosis, moderately large hiatal hernia, colonic diverticulosis, umbilical supraumbilical hernia.  In-house neurologist Dr. Lynn was notified about this patient before transferred who recommends admitting patient to the hospitalist team and consulting neurology.    Patient was seen when she got to the floor patient states she developed right lower extremity numbness and tingling sensation that started this morning.  She states the paresthesias seem to be moving upwards only on the right side.  Patient denies respiratory distress.        Review of Systems   Otherwise complete ROS reviewed and negative except as mentioned in the HPI.    Past Medical History:   Past Medical History:   Diagnosis Date    Cystocele with rectocele     Diabetes mellitus     Goiter     Hashimoto's thyroiditis     Headache     Hypothyroidism     Mumps pancreatitis     Pancreatitis     Peptic ulceration     Thyroid nodule      Past Surgical History:  Past Surgical History:   Procedure Laterality Date    BLADDER SUSPENSION      CHOLECYSTECTOMY      COLOSTOMY      CYSTOCELE REPAIR      HERNIA REPAIR      HYSTERECTOMY      OVARIAN CYST REMOVAL      RECTOCELE REPAIR      SMALL INTESTINE SURGERY      TONSILLECTOMY       Social History:  reports that she has never smoked. She has never used smokeless tobacco. She reports that she does  not drink alcohol and does not use drugs.    Family History: family history includes Cancer in her mother; Hypertension in her father; Stroke in her father.       Allergies:  Allergies   Allergen Reactions    Compazine [Prochlorperazine] Seizure    Erythromycin Itching and Other (See Comments)     Balance issues      Ultram [Tramadol] Other (See Comments)     faints    Adhesive Tape Rash       Medications:  Prior to Admission medications    Medication Sig Start Date End Date Taking? Authorizing Provider   CALCIUM-MAGNESIUM-ZINC PO Take 1 tablet by mouth Daily.   Yes Anamaria Kenyon MD   Coenzyme Q10 (CoQ10) 100 MG capsule Take 3 capsules by mouth Daily.   Yes Anamaria Kenyon MD   Cyanocobalamin (B-12) 1000 MCG tablet Take 1 tablet by mouth Daily.   Yes Anamaria Kenyon MD   glipizide (GLUCOTROL XL) 10 MG 24 hr tablet Take 1 tablet by mouth Daily.   Yes Anamaria Kenyon MD   glucosamine-chondroitin 500-400 MG capsule capsule Take 1 capsule by mouth 3 (Three) Times a Day With Meals.   Yes Anamaria Kenyon MD   insulin detemir (Levemir FlexPen) 100 UNIT/ML injection Inject 8 Units under the skin into the appropriate area as directed Daily. 7/25/23  Yes Alex Saravia APRN   levothyroxine (SYNTHROID, LEVOTHROID) 150 MCG tablet Take 1 tablet by mouth Daily. Am, tues/thurs/sa/land 5/10/23  Yes Anamaria Kenyon MD   levothyroxine (SYNTHROID, LEVOTHROID) 175 MCG tablet Take 1 tablet by mouth Every Morning. Monday, Wednesday and Friday   Yes Anamaria Kenyon MD   magnesium gluconate (MAGONATE) 500 MG tablet Take 1 tablet by mouth 2 (Two) Times a Day.   Yes Anamaria Kenyon MD   montelukast (SINGULAIR) 10 MG tablet Take 1 tablet by mouth Daily. 6/14/23  Yes Anamaria Kenyon MD   neomycin-bacitracin-polymyxin-hydrocortisone (CORTISPORIN) 1 % ophthalmic ointment Administer 1 application  to both eyes 2 (Two) Times a Day.   Yes Anamaria Kenyon MD   Omega-3 Fatty Acids (fish  oil) 1000 MG capsule capsule Take 1 capsule by mouth Daily With Breakfast.   Yes Anamaria Kenyon MD   Pancrelipase, Lip-Prot-Amyl, (CREON) 93794-948049 units capsule delayed-release particles capsule Take 1 capsule by mouth 3 (Three) Times a Day With Meals.   Yes Anamaria Kenyon MD   Probiotic Product (Risaquad-2) capsule capsule Take 1 capsule by mouth Daily.   Yes Anamaria Kenyon MD   sertraline (ZOLOFT) 100 MG tablet Take 1 tablet by mouth Daily. 7/12/23  Yes Anamaria Kenyon MD   vitamin D3 125 MCG (5000 UT) capsule capsule Take 1 capsule by mouth Daily.   Yes Anamaria Kenyon MD   omeprazole (priLOSEC) 40 MG capsule Take 1 capsule by mouth Daily.  10/16/23 Yes Anamaria Kenyon MD   estradiol (ESTRACE) 0.1 MG/GM vaginal cream Insert 2 g into the vagina Daily As Needed. 2-3 times wk    Anamaria Kenyon MD   hydrOXYzine (ATARAX) 25 MG tablet Take 1 tablet by mouth Daily As Needed (Dizziness). 8/23/23   Alex Saravia APRN   methocarbamol (ROBAXIN) 500 MG tablet Take 1 tablet by mouth Daily As Needed for Muscle Spasms. 8/23/23   Alex Saravia APRN   ondansetron (ZOFRAN) 4 MG tablet Take 1 tablet by mouth Every 8 (Eight) Hours As Needed for Nausea or Vomiting. 8/23/23   Alex Saravia APRN   rizatriptan (MAXALT) 10 MG tablet Take 1 tablet by mouth 1 (One) Time As Needed. 3/30/23   Anamaria Kenyon MD   albuterol (PROVENTIL) 2.5 MG/0.5ML nebulizer solution Take 2.5 mg by nebulization Every 8 (Eight) Hours As Needed for Wheezing or Shortness of Air (for 30 days).  10/16/23  Anamaria Kenyon MD   albuterol sulfate  (90 Base) MCG/ACT inhaler Inhale 2 puffs Every 4 (Four) Hours As Needed for Wheezing.  10/16/23  Anamaria Kenyon MD   brompheniramine-pseudoephedrine-DM 30-2-10 MG/5ML syrup Take 5 mL by mouth Every 6 (Six) Hours As Needed for Congestion or Cough.  Patient not taking: Reported on 10/16/2023 10/11/23 10/16/23  Alex Saravia APRN  "  cephalexin (Keflex) 500 MG capsule Take 1 capsule by mouth 2 (Two) Times a Day.  Patient not taking: Reported on 10/16/2023 9/22/23 10/16/23  Digna Dominguez APRN   clonazePAM (KlonoPIN) 0.5 MG tablet Take 1 tablet by mouth At Night As Needed.  Patient not taking: Reported on 10/16/2023 3/30/23 10/16/23  Anamaria Kenyon MD   glipizide (GLUCOTROL) 10 MG tablet Take 1 tablet by mouth 2 (Two) Times a Day Before Meals. 6/14/23 10/16/23  Anamaria Kenyon MD   Insulin Pen Needle (Pen Needles) 31G X 8 MM misc 1 each Daily. 7/25/23 10/16/23  Alex Saravia APRN   levothyroxine (SYNTHROID, LEVOTHROID) 175 MCG tablet 1 TABLET IN THE MORNING ON AN EMPTY STOMACH ORALLY MONDAY, WEDNESDAY, FRIDAY 90 DAYS 9/13/23 10/16/23  Alex Saravia APRN   neomycin-bacitracin-polymyxin-hydrocortisone (CORTISPORIN) 1 % ophthalmic ointment Administer  to both eyes 2 (Two) Times a Day. 9/22/23 10/16/23  Digna Dominguez APRN   Nirmatrelvir&Ritonavir 300/100 (PAXLOVID) 20 x 150 MG & 10 x 100MG tablet therapy pack tablet Take 3 tablets by mouth 2 (Two) Times a Day for 5 days.  Patient not taking: Reported on 10/16/2023 10/11/23 10/16/23  Alex Saravia APRN   Probiotic Product (PROBIOTIC DAILY PO) Take  by mouth.  10/16/23  ProviderAnamaria MD     I have utilized all available immediate resources to obtain, update, or review the patient's current medications (including all prescriptions, over-the-counter products, herbals, cannabis/cannabidiol products, and vitamin/mineral/dietary (nutritional) supplements).    Objective     Vital Signs: /59 (BP Location: Right arm, Patient Position: Lying)   Pulse 56   Temp 98.6 °F (37 °C) (Oral)   Resp 18   Ht 170.2 cm (67\")   Wt 87.5 kg (193 lb)   SpO2 99%   BMI 30.23 kg/m²   Physical Exam  Vitals and nursing note reviewed.   Constitutional:       General: She is not in acute distress.     Appearance: Normal appearance. She is not diaphoretic.   HENT:      Head: " Normocephalic and atraumatic.      Right Ear: External ear normal.      Left Ear: External ear normal.   Eyes:      General: No scleral icterus.     Extraocular Movements: Extraocular movements intact.      Conjunctiva/sclera: Conjunctivae normal.   Cardiovascular:      Rate and Rhythm: Normal rate and regular rhythm.      Heart sounds: Normal heart sounds.   Pulmonary:      Effort: Pulmonary effort is normal. No respiratory distress.      Breath sounds: Normal breath sounds.   Chest:      Chest wall: No tenderness.   Abdominal:      General: Bowel sounds are normal.      Palpations: Abdomen is soft.      Tenderness: There is no abdominal tenderness.   Musculoskeletal:         General: No swelling or tenderness.      Cervical back: Normal range of motion and neck supple.      Right lower leg: No edema.      Left lower leg: No edema.   Skin:     General: Skin is warm and dry.      Capillary Refill: Capillary refill takes less than 2 seconds.      Findings: No erythema or rash.   Neurological:      General: No focal deficit present.      Mental Status: She is alert and oriented to person, place, and time.      Cranial Nerves: No cranial nerve deficit.      Sensory: Sensory deficit (right LE) present.      Motor: Weakness (3/5 MS of right LE) present.      Coordination: Coordination normal.      Gait: Gait abnormal.   Psychiatric:         Behavior: Behavior normal.              Results Reviewed:  Lab Results (last 24 hours)       ** No results found for the last 24 hours. **          Imaging Results (Last 24 Hours)       ** No results found for the last 24 hours. **          I have personally reviewed and interpreted the radiology studies and ECG obtained at time of admission.     Assessment / Plan   Assessment:   Active Hospital Problems    Diagnosis     **Paresthesia and pain of right extremity     Type 2 diabetes mellitus with hyperglycemia     Hypothyroidism (acquired)     Chronic pancreatitis     Nonintractable  migraine     Dizziness, nonspecific      Patient is a 67-year-old female with history of hypothyroidism, type 2 diabetes, chronic pancreatitis, migraine headaches, dizziness who was transferred from [facility] ER due to acute right lower extremity numbness and tingling sensation affecting her gait.  Patient was recently treated for COVID-19 infection with Paxlovid.  Given that patient does have a history of chronic dizziness and migraine.  This could be presentation of complex migraine versus other etiologies such as uncontrolled diabetes, uncontrolled thyroid disease, electrolyte abnormalities.     Treatment Plan  The patient will be admitted to my service here at [facility].     Paresthesia of the right lower extremity: Etiology unclear.  Differential diagnosis complex migraine versus transverse myelitis versus Guillain-Barré versus diabetic versus endocrinology.    Admit patient to the floor  Obtain TSH, vitamin B12, folate, CRP and sed rate.  Neurology consulted appreciate recommendations and management  Neurochecks  Monitor vitals  May consider MRI of the spine and LP if indicated.  Will defer to neurology assessment.    2: Type 2 diabetes  Hemoglobin A1c  Resume basal insulin and insulin sliding scale  Monitor labs per protocol.    3: Hypothyroidism: TSH was 9.9 at outside facility.  We will repeat labs and continue levothyroxine therapy.    4.  Migraine headaches and chronic dizziness: Complex migraines may present with other neurological symptoms.  I will defer to neurology for further clarifications.    Cardiac monitoring.      DVT prophylaxis: SCDs  Medical Decision Making  Number and Complexity of problems: 1 acute problem with high complexity.  Multiple chronic medical conditions  Differential Diagnosis: As above    Conditions and Status        Condition is unchanged.     [facility] Data  External documents reviewed: Epic records, facesheet and Care Everywhere  Cardiac tracing (EKG, telemetry)  interpretation: EKG from outside facility shows sinus bradycardia.  Radiology interpretation: CT head, CT abdomen pelvis from outside facility reviewed  Labs reviewed: Labs from outside facility reviewed  Any tests that were considered but not ordered: N/A     Decision rules/scores evaluated (example [id number], Wells, etc): N/A     Discussed with: Patient and her spouse who was in the room     Care Planning  Shared decision making: Patient apprised of current labs, vitals, imaging and treatment plan.  They are agreeable with proceeding with plans as discussed.    Code status and discussions: ....  Code Status and Medical Interventions:   Ordered at: 10/16/23 1449     Level Of Support Discussed With:    Patient     Code Status (Patient has no pulse and is not breathing):    CPR (Attempt to Resuscitate)     Medical Interventions (Patient has pulse or is breathing):    Full Support         Disposition  Social Determinants of Health that impact treatment or disposition: none  Estimated length of stay is 1-2 days.     I confirmed that the patient's advanced care plan is present, code status is documented, and a surrogate decision maker is listed in the patient's medical record.     The patient's surrogate decision maker is spouse. Information confirmed on ACP documentation.     The patient was seen and examined by me on 10/16/23 at 1400.    Electronically signed by Jose Avery MD, 10/16/23, 15:19 CDT.               Electronically signed by Jose Avery MD at 10/16/23 1521       Operative/Procedure Notes (last 7 days)  Notes from 10/11/23 1230 through 10/18/23 1230   No notes of this type exist for this encounter.          Physician Progress Notes (last 24 hours)        Cirilo Lynn MD at 10/18/23 1022            Neurology Progress Note      Chief Complaint:  f/u RLE weakness and numbness  Length of Stay:  0   Subjective     Subjective:  She is doing very well this morning.  She is sitting up at the  bedside in a chair.  Her  is in the room.  She tells me that her right lower extremity has improved from a strength standpoint.  Unfortunately she is having some dysesthesia and  paresthesias.   This has kept her up for the last 2 nights and is extremely uncomfortable.    Medications:  Current Facility-Administered Medications   Medication Dose Route Frequency Provider Last Rate Last Admin    acetaminophen (TYLENOL) tablet 650 mg  650 mg Oral Q4H PRN Jose Avery MD   650 mg at 10/17/23 2219    Or    acetaminophen (TYLENOL) 160 MG/5ML oral solution 650 mg  650 mg Oral Q4H PRN Jose Avery MD        Or    acetaminophen (TYLENOL) suppository 650 mg  650 mg Rectal Q4H PRN Jose Avery MD        atorvastatin (LIPITOR) tablet 80 mg  80 mg Oral Nightly Sherlyn Fernandes, APRN   80 mg at 10/17/23 2219    sennosides-docusate (PERICOLACE) 8.6-50 MG per tablet 2 tablet  2 tablet Oral BID Jose Avery MD   2 tablet at 10/18/23 0827    And    polyethylene glycol (MIRALAX) packet 17 g  17 g Oral Daily PRN Jose Avery MD        And    bisacodyl (DULCOLAX) EC tablet 5 mg  5 mg Oral Daily PRN Jose Avery MD        And    bisacodyl (DULCOLAX) suppository 10 mg  10 mg Rectal Daily PRN Jose Avery MD        calcium carbonate (TUMS) chewable tablet 500 mg (200 mg elemental)  1 tablet Oral BID PRN Jose Avery MD        Calcium Replacement - Follow Nurse / BPA Driven Protocol   Does not apply PRN Jose Avery MD        dextrose (D50W) (25 g/50 mL) IV injection 25 g  25 g Intravenous Q15 Min PRN Jose Avery MD        dextrose (GLUTOSE) oral gel 15 g  15 g Oral Q15 Min PRN Jose Avery MD        glucagon (GLUCAGEN) injection 1 mg  1 mg Intramuscular Q15 Min PRN Jose Avery MD        HYDROcodone-acetaminophen (NORCO) 7.5-325 MG per tablet 1 tablet  1 tablet Oral Q6H PRN Jose Avery MD   1 tablet at 10/17/23 1444    insulin detemir  (LEVEMIR) injection 10 Units  10 Units Subcutaneous Nightly Jose Avery MD   10 Units at 10/17/23 2220    Insulin Lispro (humaLOG) injection 3-14 Units  3-14 Units Subcutaneous 4x Daily AC & at Bedtime Jose Avery MD   3 Units at 10/18/23 0826    lactobacillus acidophilus (RISAQUAD) capsule 1 capsule  1 capsule Oral Daily Jose Avery MD   1 capsule at 10/18/23 0827    levothyroxine (SYNTHROID, LEVOTHROID) tablet 175 mcg  175 mcg Oral Q AM Jose Avery MD   175 mcg at 10/18/23 0548    magnesium (as) gluconate (MAGONATE) tablet 27 mg  27 mg Oral BID Jose Avery MD   27 mg at 10/18/23 0827    Magnesium Standard Dose Replacement - Follow Nurse / BPA Driven Protocol   Does not apply PRN Jose Avery MD        melatonin tablet 5 mg  5 mg Oral Nightly PRN Jose Avery MD   5 mg at 10/17/23 2219    methocarbamol (ROBAXIN) tablet 500 mg  500 mg Oral Daily PRN Jose Avery MD   500 mg at 10/17/23 2219    montelukast (SINGULAIR) tablet 10 mg  10 mg Oral Nightly Jose Avery MD   10 mg at 10/17/23 2219    nitroglycerin (NITROSTAT) SL tablet 0.4 mg  0.4 mg Sublingual Q5 Min PRN Jose Avery MD        ondansetron (ZOFRAN) tablet 4 mg  4 mg Oral Q6H PRN Jose Avery MD        Or    ondansetron (ZOFRAN) injection 4 mg  4 mg Intravenous Q6H PRN Jose Avery MD        pancrelipase (Lip-Prot-Amyl) (CREON) capsule 12,000 units of lipase  12,000 units of lipase Oral TID With Meals Jose Avery MD   12,000 units of lipase at 10/18/23 0827    Phosphorus Replacement - Follow Nurse / BPA Driven Protocol   Does not apply PRN Jose Avery MD        Potassium Replacement - Follow Nurse / BPA Driven Protocol   Does not apply PRN Jose Avery MD        sertraline (ZOLOFT) tablet 100 mg  100 mg Oral Daily Jose Avery MD   100 mg at 10/18/23 0827    sodium chloride 0.9 % flush 10 mL  10 mL Intravenous Q12H Jose Avery MD    10 mL at 10/18/23 0829    sodium chloride 0.9 % flush 10 mL  10 mL Intravenous PRN Jose Avery MD        sodium chloride 0.9 % infusion 40 mL  40 mL Intravenous PRN Jose Avery MD        vitamin B-12 (CYANOCOBALAMIN) tablet 1,000 mcg  1,000 mcg Oral Daily Jose Avery MD   1,000 mcg at 10/18/23 0827    vitamin D3 capsule 5,000 Units  5,000 Units Oral Daily Jose Avery MD   5,000 Units at 10/18/23 0827             Objective      Vital Signs  Temp:  [97.6 °F (36.4 °C)-98.7 °F (37.1 °C)] 97.6 °F (36.4 °C)  Heart Rate:  [50-62] 50  Resp:  [16-18] 16  BP: (119-134)/(52-72) 127/52    Telemetry SB - S 49-73    Physical Exam:    General Exam:  Head:  Normocephalic, atraumatic  HEENT:  Neck supple  Fundoscopic Exam:  No signs of disc edema  CVS:  Regular rate and rhythm.  No murmurs  Carotid Examination:  No bruits  Lungs:  Clear to auscultation  Abdomen:  Nontender, nondistended  Extremities:  No signs of peripheral edema  Skin:  No rashes    Neurologic Exam:    Mental Status:    -Alert, Oriented X 3  -No word-finding difficulties  -No aphasia  -No dysarthria  -Follows simple and complex commands    CN II:  Visual fields full.  Pupils equally reactive to light  CN III, IV, VI:  Extraocular Muscles full with no signs of nystagmus  CN V:  Facial sensory is symmetric with no asymmetries.  CN VII:  Facial motor symmetric  CN VIII:  Gross hearing intact bilaterally  CN IX:  Palate elevates symmetrically  CN X:  Palate elevates symmetrically  CN XI:  Shoulder shrug symmetric  CN XII:  Tongue protrudes to midline  Motor: (strength out of 5:  1= minimal movement, 2 = movement in plane of gravity, 3 = movement against gravity, 4 = movement against some resistance, 5 = full strength)    -Right Upper Ext: Proximal: 5 Distal: 5  -Left Upper Ext: Proximal: 5 Distal: 5    -Right Lower Ext: Proximal: 4 Distal: 4  -Left Lower Ext: Proximal: 5 Distal: 5    DTR:  -Right   Bicep: 2+ Tricep: 2+ Brachoradialis:  2+   Patella: 2+ Ankle: 1+ Neg Babinski  -Left   Bicep: 2+ Tricep: 2+ Brachoradialis: 2+   Patella: 2+ Ankle: 2+ Neg Babinski    Sensory:  Increased sensitivity to touch in RLE.      Coordination:  -Finger to nose intact  -Heel to shin intact      Gait  -up with assist.      Results Review:      Labs:    A1c 7.8  THS 4.27  T4 0.96  B12 571  ESR 6  CRP <0.3      Imaging:  MRI Brain With & Without Contrast    Result Date: 10/17/2023   No acute intracranial findings.  This report was signed and finalized on 10/17/2023 6:52 PM CDT by Dr. Luis Chawla MD.      MRI Thoracic Spine With & Without Contrast    Result Date: 10/16/2023  1.  No acute findings. 2.  Mild multilevel degenerative change. 3.  Large hiatal hernia.   This report was signed and finalized on 10/16/2023 9:04 PM CDT by Dr. Luis Chawla MD.      MRI Lumbar Spine With & Without Contrast    Result Date: 10/16/2023   1.  No acute osseous findings. 2.  8 mm anterolisthesis of L5 on S1. 3.  Moderate multilevel degenerative change, as described above. No area of severe central canal stenosis. There is moderate right sided neural foraminal stenosis at L4-L5 and L5-S1 with disc/osteophyte material closely approximating and likely abutting the exiting right L4 and L5 nerve roots.   This report was signed and finalized on 10/16/2023 7:56 PM CDT by Dr. Luis Chawla MD.        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  There are no active signs of inflammation with her inflammatory factors being unremarkable.  There are no active signs of a myelitis on the MRI of her spine.  There is no evidence of an autoimmune disease in her brain.  There are no T2 flair abnormalities consistent with this.   She has no visual issues and therefore neuromyelitis optica is less likely.  I have offered her lumbar puncture to pursue further testing but she does not want to proceed with this at this time.  She is having drastic improvement with supportive care.  She wants to hold off for now.  We can [place] back to a lumbar puncture if she fails to reach her baseline or if she has any worsening of her neurologic symptoms.  I would like to give her a neuropathic agent to help with her symptomatic paresthesias.  Covid + on 10/11/2023  DM uncontrolled with hyperglycemia. A1C 7.8  Thyroid disorder  HLD.     Plan:  Follow serum NMO  We will start gabapentin 300 mg 3 times daily  Discharge planning will be acute rehabilitation        Medical Decision Making    Number/Complexity of Problems  Moderate  1 undiagnosed new problem with uncertain prognosis -   1 acute illness with systemic symptoms -   High  1 acute or chronic illness that pose a threat to life/body function -   high     MDM Data  Moderate - 1/3 categories  Extensive - 2/3 categories    Category 1: 3 of the following  Review of external notes  Review of results  Ordering of each unique test  Independent historian  Category 2:  Independent interpretation of test (ex: imaging)  Category 3:  Discussion of management with another provider    extensive     Treatment Plan  Moderate - Prescription Drug management  High  Drug therapy requiring intensive monitoring for toxicity  Decision regarding hospitalization or escalation of care  De-escalate care/DNR decisions  high       Cirilo Lynn MD  10/18/23  10:22 CDT    Electronically signed by Cirilo Lynn MD at 10/18/23 1026       Consult Notes (last 24 hours)  Notes from 10/17/23 1231 through 10/18/23 1231   No notes of this type exist for this encounter.          Physical Therapy Notes (last 24 hours)        Bri Phillips, JULIO at 10/17/23 4302  Version 1 of 1         Acute Care - Physical Therapy Treatment  Note   [facility]     Patient Name: Charlene Bey  : 1955  MRN: [id number]  Today's Date: 10/17/2023      Visit Dx:     ICD-10-CM ICD-9-CM   1. Gait abnormality [R26.9]  R26.9 781.2     Patient Active Problem List   Diagnosis    Paresthesia and pain of right extremity    Type 2 diabetes mellitus with hyperglycemia    Hypothyroidism (acquired)    Chronic pancreatitis    Nonintractable migraine    Dizziness, nonspecific     Past Medical History:   Diagnosis Date    Cystocele with rectocele     Diabetes mellitus     Goiter     Hashimoto's thyroiditis     Headache     Hypothyroidism     Mumps pancreatitis     Pancreatitis     Peptic ulceration     Thyroid nodule      Past Surgical History:   Procedure Laterality Date    BLADDER SUSPENSION      CHOLECYSTECTOMY      COLOSTOMY      CYSTOCELE REPAIR      HERNIA REPAIR      HYSTERECTOMY      OVARIAN CYST REMOVAL      RECTOCELE REPAIR      SMALL INTESTINE SURGERY      TONSILLECTOMY       PT Assessment (last 12 hours)       PT Evaluation and Treatment       Row Name 10/17/23 1310          Physical Therapy Time and Intention    Subjective Information complains of;weakness  -KJ     Document Type therapy note (daily note)  -KJ     Mode of Treatment physical therapy  -KJ     Patient Effort good  -KJ       Row Name 10/17/23 1310          General Information    Existing Precautions/Restrictions fall  R side weakness/ decreased motor planning and coordination  -KJ       Row Name 10/17/23 1310          Pain    Pretreatment Pain Rating 3/10  -KJ     Posttreatment Pain Rating 3/10  -KJ     Pain Location - Side/Orientation Right  -KJ     Pain Location lower  -KJ     Pain Location - back  -KJ       Row Name 10/17/23 1310          Bed Mobility    Comment, (Bed Mobility) in bathroom  -KJ       Row Name 10/17/23 1310          Sit-Stand Transfer    Sit-Stand [place] (Transfers) contact guard  -KJ     Assistive Device (Sit-Stand Transfers) walker, front-wheeled  -KJ       Row Name  10/17/23 1310          Stand-Sit Transfer    Stand-Sit [place] (Transfers) verbal cues;contact guard  -KJ     Assistive Device (Stand-Sit Transfers) walker, front-wheeled  -KJ       Row Name 10/17/23 1310          Gait/Stairs (Locomotion)    [place] Level (Gait) verbal cues;contact guard;minimum assist (75% patient effort)  -KJ     Assistive Device (Gait) walker, front-wheeled  -KJ     Distance in Feet (Gait) 40' x 2  -KJ     Deviations/Abnormal Patterns (Gait) gait speed decreased;right sided deviations;stride length decreased;weight shifting decreased;ataxic  -KJ     Bilateral Gait Deviations heel strike decreased  -KJ       Row Name 10/17/23 1310          Motor Skills    Therapeutic Exercise aerobic  -KJ       Row Name 10/17/23 1310          Aerobic Exercise    Comment, Aerobic Exercise (Therapeutic Exercise) HEP given for BLE's strengthening  -KJ       Row Name 10/17/23 1310          Positioning and Restraints    Pre-Treatment Position in bed  -KJ     Post Treatment Position chair  -KJ     In Chair call light within reach  -KJ               User Key  (r) = Recorded By, (t) = Taken By, (c) = Cosigned By      Initials Name Provider Type    Bri Hoffman, PTA Physical Therapist Assistant                    Physical Therapy Education       Title: PT OT SLP Therapies (Done)       Topic: Physical Therapy (Done)       Point: Mobility training (Done)       Learning Progress Summary             Patient Acceptance, E,TB,DAVIDA, LILIBETH,CARMEN,NR by GLENNA at 10/17/2023 1014    Comment: education re: purpose of PT/importance of activity, for safety/falls prevention, proper use of rwx w/ emphasis on gait sequencing, placement of rwx, hand placement w/ transitions, T/I AA HS and quad sets w/ return demo                         Point: Home exercise program (Done)       Learning Progress Summary             Patient Acceptance, E,TB,DAVIDA, LILIBETH,CARMEN,NR by GLENNA at 10/17/2023 1014    Comment: education re: purpose of PT/importance of  activity, for safety/falls prevention, proper use of rwx w/ emphasis on gait sequencing, placement of rwx, hand placement w/ transitions, T/I AA HS and quad sets w/ return demo                         Point: Precautions (Done)       Learning Progress Summary             Patient Acceptance, E,TB,D, VU,DU,NR by  at 10/17/2023 1014    Comment: education re: purpose of PT/importance of activity, for safety/falls prevention, proper use of rwx w/ emphasis on gait sequencing, placement of rwx, hand placement w/ transitions, T/I AA HS and quad sets w/ return demo                                         User Key       Initials Effective Dates Name Provider Type Discipline     08/02/18 -  Tiffani York, PT Physical Therapist PT                  PT Recommendation and Plan             Time Calculation:    PT Charges       Row Name 10/17/23 1359 10/17/23 1016          Time Calculation    Start Time 1310  -KJ 0849  -     Stop Time 1333  -KJ 1015  -     Time Calculation (min) 23 min  -KJ 86 min  -     PT Received On -- 10/17/23  -     PT Goal Re-Cert Due Date -- 10/27/23  -        Time Calculation- PT    Total Timed Code Minutes- PT 23 minute(s)  -KJ --               User Key  (r) = Recorded By, (t) = Taken By, (c) = Cosigned By      Initials Name Provider Type     Bri Phillips PTA Physical Therapist Assistant    Tiffani Barajas, PT Physical Therapist                  Therapy Charges for Today       Code Description Service Date Service Provider Modifiers Qty    28775698082 HC GAIT TRAINING EA 15 MIN 10/17/2023 Bri Phillips, PTA GP 1    13378990495 HC PT THER PROC EA 15 MIN 10/17/2023 Bri Phillips, PTA GP 1            PT G-Codes  Outcome Measure Options: AM-PAC 6 Clicks Daily Activity (OT)  AM-PAC 6 Clicks Score (PT): 17  AM-PAC 6 Clicks Score (OT): 21    Bri Phillips PTA  10/17/2023      Electronically signed by Bri Phillips PTA at 10/17/23 1400       Cali Liang PTA at 10/17/23  1441  Version 1 of 1         Acute Care - Physical Therapy Treatment Note   [facility]     Patient Name: Charlene Bey  : 1955  MRN: [id number]  Today's Date: 10/17/2023      Visit Dx:     ICD-10-CM ICD-9-CM   1. Gait abnormality [R26.9]  R26.9 781.2     Patient Active Problem List   Diagnosis    Paresthesia and pain of right extremity    Type 2 diabetes mellitus with hyperglycemia    Hypothyroidism (acquired)    Chronic pancreatitis    Nonintractable migraine    Dizziness, nonspecific     Past Medical History:   Diagnosis Date    Cystocele with rectocele     Diabetes mellitus     Goiter     Hashimoto's thyroiditis     Headache     Hypothyroidism     Mumps pancreatitis     Pancreatitis     Peptic ulceration     Thyroid nodule      Past Surgical History:   Procedure Laterality Date    BLADDER SUSPENSION      CHOLECYSTECTOMY      COLOSTOMY      CYSTOCELE REPAIR      HERNIA REPAIR      HYSTERECTOMY      OVARIAN CYST REMOVAL      RECTOCELE REPAIR      SMALL INTESTINE SURGERY      TONSILLECTOMY       PT Assessment (last 12 hours)       PT Evaluation and Treatment       Row Name 10/17/23 1415 10/17/23 1310       Physical Therapy Time and Intention    Subjective Information complains of;weakness  -TB complains of;weakness  -KJ    Document Type therapy note (daily note)  -TB therapy note (daily note)  -KJ    Mode of Treatment physical therapy  -TB physical therapy  -KJ    Patient Effort good  -TB good  -KJ      Row Name 10/17/23 1415 10/17/23 1310       General Information    Existing Precautions/Restrictions fall  R side weakness/ decreased motor planning and coordination  -TB fall  R side weakness/ decreased motor planning and coordination  -KJ      Row Name 10/17/23 1415 10/17/23 1310       Pain    Pretreatment Pain Rating 6/10  -TB 3/10  -KJ    Posttreatment Pain Rating 6/10  -TB 3/10  -KJ    Pain Location - Side/Orientation Right  -TB Right  -KJ    Pain Location lower  -TB lower  -KJ    Pain Location - back  -TB  back  -KJ      Row Name 10/17/23 1415 10/17/23 1310       Bed Mobility    Bed Mobility scooting/bridging;supine-sit  -TB --    Scooting/Bridging [place] (Bed Mobility) independent  -TB --    Supine-Sit [place] (Bed Mobility) independent  -TB --    Comment, (Bed Mobility) -- in bathroom  -KJ      Row Name 10/17/23 1415 10/17/23 1310       Sit-Stand Transfer    Sit-Stand [place] (Transfers) contact guard  -TB contact guard  -KJ    Assistive Device (Sit-Stand Transfers) walker, front-wheeled  -TB walker, front-wheeled  -KJ      Row Name 10/17/23 1415 10/17/23 1310       Stand-Sit Transfer    Stand-Sit [place] (Transfers) verbal cues;contact guard  -TB verbal cues;contact guard  -KJ    Assistive Device (Stand-Sit Transfers) walker, front-wheeled  -TB walker, front-wheeled  -KJ      Row Name 10/17/23 1415 10/17/23 1310       Gait/Stairs (Locomotion)    [place] Level (Gait) verbal cues;contact guard;minimum assist (75% patient effort)  -TB verbal cues;contact guard;minimum assist (75% patient effort)  -KJ    Assistive Device (Gait) walker, front-wheeled  -TB walker, front-wheeled  -KJ    Distance in Feet (Gait) 50'x2  -TB 40' x 2  -KJ    Deviations/Abnormal Patterns (Gait) gait speed decreased;right sided deviations;stride length decreased;weight shifting decreased;ataxic  -TB gait speed decreased;right sided deviations;stride length decreased;weight shifting decreased;ataxic  -KJ    Bilateral Gait Deviations heel strike decreased  -TB heel strike decreased  -KJ      Row Name 10/17/23 1415          Balance    Balance Assessment sitting static balance;sitting dynamic balance  -TB     Static Sitting Balance independent  -TB     Dynamic Sitting Balance independent  -TB     Position, Sitting Balance sitting edge of bed  -TB       Row Name 10/17/23 1310          Motor Skills    Therapeutic Exercise aerobic  -KJ       Row Name 10/17/23 1310          Aerobic Exercise    Comment, Aerobic Exercise  (Therapeutic Exercise) HEP given for BLE's strengthening  -KJ       Row Name 10/17/23 1415 10/17/23 1310       Positioning and Restraints    Pre-Treatment Position in bed  -TB in bed  -KJ    Post Treatment Position bed  -TB chair  -KJ    In Bed fowlers;call light within reach;encouraged to call for assist;with family/caregiver;side rails up x2  -TB --    In Chair -- call light within reach  -KJ              User Key  (r) = Recorded By, (t) = Taken By, (c) = Cosigned By      Initials Name Provider Type    Bri Hoffman, PTA Physical Therapist Assistant    Cali Mederos, PTA Physical Therapist Assistant                    Physical Therapy Education       Title: PT OT SLP Therapies (Done)       Topic: Physical Therapy (Done)       Point: Mobility training (Done)       Learning Progress Summary             Patient Acceptance, E,TB,D, LILIBETH,DU,NR by  at 10/17/2023 1014    Comment: education re: purpose of PT/importance of activity, for safety/falls prevention, proper use of rwx w/ emphasis on gait sequencing, placement of rwx, hand placement w/ transitions, T/I AA HS and quad sets w/ return demo                         Point: Home exercise program (Done)       Learning Progress Summary             Patient Acceptance, E,TB,D, VU,DU,NR by  at 10/17/2023 1014    Comment: education re: purpose of PT/importance of activity, for safety/falls prevention, proper use of rwx w/ emphasis on gait sequencing, placement of rwx, hand placement w/ transitions, T/I AA HS and quad sets w/ return demo                         Point: Precautions (Done)       Learning Progress Summary             Patient Acceptance, E,TB,D, VU,DU,NR by  at 10/17/2023 1014    Comment: education re: purpose of PT/importance of activity, for safety/falls prevention, proper use of rwx w/ emphasis on gait sequencing, placement of rwx, hand placement w/ transitions, T/I AA HS and quad sets w/ return demo                                          User Key       Initials Effective Dates Name Provider Type Discipline     08/02/18 -  Tiffani York, PT Physical Therapist PT                  PT Recommendation and Plan             Time Calculation:    PT Charges       Row Name 10/17/23 1604 10/17/23 1359 10/17/23 1016       Time Calculation    Start Time 1415  -TB 1310  -KJ 0849  -JE    Stop Time 1441  -TB 1333  -KJ 1015  -JE    Time Calculation (min) 26 min  -TB 23 min  -KJ 86 min  -JE    PT Received On 10/17/23  -TB -- 10/17/23  -    PT Goal Re-Cert Due Date -- -- 10/27/23  -       Time Calculation- PT    Total Timed Code Minutes- PT 26 minute(s)  -TB 23 minute(s)  -KJ --              User Key  (r) = Recorded By, (t) = Taken By, (c) = Cosigned By      Initials Name Provider Type    Bri Hoffman, JULIO Physical Therapist Assistant    Cali Mederos PTA Physical Therapist Assistant    Tiffani Barajas, PT Physical Therapist                  Therapy Charges for Today       Code Description Service Date Service Provider Modifiers Qty    50881481584 HC GAIT TRAINING EA 15 MIN 10/17/2023 Cali Liang PTA GP 2            PT G-Codes  Outcome Measure Options: AM-PAC 6 Clicks Daily Activity (OT)  AM-PAC 6 Clicks Score (PT): 17  AM-PAC 6 Clicks Score (OT): 21    Cali Liang PTA  10/17/2023      Electronically signed by Cali Liang PTA at 10/17/23 6345       Bri Phillips PTA at 10/18/23 0973  Version 1 of 1         Goal Outcome Evaluation:  Plan of Care Reviewed With: patient        Progress: improving  Outcome Evaluation: PT tx completed. Pt reports she is moving RLE better and feels stronger. Although, her movement is improved she still presents with decreased motor control/coordination of RLE during gait. She is unable to ambulate without the use of an assistive device safely. High risk for falls. Recommend rehab or OP therapy.           Electronically signed by Bri Phillips PTA at 10/18/23 0925        Bri Phillips, PTA at 10/18/23 0958  Version 1 of 1         Acute Care - Physical Therapy Treatment Note  [facility]     Patient Name: Charlene Bey  : 1955  MRN: [id number]  Today's Date: 10/18/2023      Visit Dx:     ICD-10-CM ICD-9-CM   1. Gait abnormality [R26.9]  R26.9 781.2     Patient Active Problem List   Diagnosis    Paresthesia and pain of right extremity    Type 2 diabetes mellitus with hyperglycemia    Hypothyroidism (acquired)    Chronic pancreatitis    Nonintractable migraine    Dizziness, nonspecific     Past Medical History:   Diagnosis Date    Cystocele with rectocele     Diabetes mellitus     Goiter     Hashimoto's thyroiditis     Headache     Hypothyroidism     Mumps pancreatitis     Pancreatitis     Peptic ulceration     Thyroid nodule      Past Surgical History:   Procedure Laterality Date    BLADDER SUSPENSION      CHOLECYSTECTOMY      COLOSTOMY      CYSTOCELE REPAIR      HERNIA REPAIR      HYSTERECTOMY      OVARIAN CYST REMOVAL      RECTOCELE REPAIR      SMALL INTESTINE SURGERY      TONSILLECTOMY       PT Assessment (last 12 hours)       PT Evaluation and Treatment       Row Name 10/18/23 0878          Physical Therapy Time and Intention    Subjective Information --  states she does feel stronger today  -KJ     Document Type therapy note (daily note)  -KJ     Mode of Treatment physical therapy  -KJ     Patient Effort good  -KJ       Row Name 10/18/23 0839          General Information    Existing Precautions/Restrictions fall  RLE weakness; decreased motor control RLE  -KJ       Row Name 10/18/23 0839          Pain    Pretreatment Pain Rating 0/10 - no pain  -KJ     Posttreatment Pain Rating 0/10 - no pain  -KJ       Row Name 10/18/23 0839          Bed Mobility    Supine-Sit [place] (Bed Mobility) independent  -KJ       Row Name 10/18/23 0839          Sit-Stand Transfer    Sit-Stand [place] (Transfers) contact guard;verbal cues  -KJ     Assistive Device (Sit-Stand  Transfers) walker, front-wheeled  -KJ       Row Name 10/18/23 0839          Stand-Sit Transfer    Stand-Sit [place] (Transfers) supervision  -KJ     Assistive Device (Stand-Sit Transfers) walker, front-wheeled  -KJ       Row Name 10/18/23 0839          Gait/Stairs (Locomotion)    [place] Level (Gait) verbal cues;minimum assist (75% patient effort)  -KJ     Distance in Feet (Gait) 10 steps without wx, required min/mod assist for balance. 30' with Rwx CG/[place]  -KJ     Deviations/Abnormal Patterns (Gait) ataxic;gait speed decreased;steppage;stride length decreased;weight shifting decreased  -KJ     Right Sided Gait Deviations heel strike decreased;weight shift ability decreased  -KJ     Comment, (Gait/Stairs) continued decreased normal motor planning of RLE during gait. Decreased hip/knee flex unless over exaggerated. Unable safely to ambulate without the use of wx  -KJ       Row Name 10/18/23 0839          Balance    Balance Assessment sitting static balance  -KJ     Static Sitting Balance independent  -KJ     Dynamic Sitting Balance independent  -KJ     Position, Sitting Balance sitting edge of bed;unsupported  -KJ     Sit to Stand Dynamic Balance contact guard  -KJ     Static Standing Balance contact guard  -KJ     Dynamic Standing Balance minimal assist;moderate assist  -KJ     Position/Device Used, Standing Balance supported  -KJ       Row Name 10/18/23 0839          Aerobic Exercise    Comment, Aerobic Exercise (Therapeutic Exercise) standing hip flex,abd, perturbations no holding to on for support, tandem stance unable to hold. A & P weight shifts, lateral weight shifts.  -KJ       Row Name 10/18/23 0839          Positioning and Restraints    Pre-Treatment Position in bed  -KJ     Post Treatment Position chair  -KJ     In Bed call light within reach  -KJ               User Key  (r) = Recorded By, (t) = Taken By, (c) = Cosigned By      Initials Name Provider Type    Bri Hoffman, PTA Physical  Therapist Assistant                    Physical Therapy Education       Title: PT OT SLP Therapies (Done)       Topic: Physical Therapy (Done)       Point: Mobility training (Done)       Learning Progress Summary             Patient Acceptance, E,TB,D, VU,DU,NR by  at 10/17/2023 1014    Comment: education re: purpose of PT/importance of activity, for safety/falls prevention, proper use of rwx w/ emphasis on gait sequencing, placement of rwx, hand placement w/ transitions, T/I AA HS and quad sets w/ return demo                         Point: Home exercise program (Done)       Learning Progress Summary             Patient Acceptance, E,TB,D, VU,DU,NR by GLENNA at 10/17/2023 1014    Comment: education re: purpose of PT/importance of activity, for safety/falls prevention, proper use of rwx w/ emphasis on gait sequencing, placement of rwx, hand placement w/ transitions, T/I AA HS and quad sets w/ return demo                         Point: Precautions (Done)       Learning Progress Summary             Patient Acceptance, E,TB,D, VU,DU,NR by GLENNA at 10/17/2023 1014    Comment: education re: purpose of PT/importance of activity, for safety/falls prevention, proper use of rwx w/ emphasis on gait sequencing, placement of rwx, hand placement w/ transitions, T/I AA HS and quad sets w/ return demo                                         User Key       Initials Effective Dates Name Provider Type Discipline     08/02/18 -  Tiffani York, PT Physical Therapist PT                  PT Recommendation and Plan     Plan of Care Reviewed With: patient  Progress: improving  Outcome Evaluation: PT tx completed. Pt reports she is moving RLE better and feels stronger. Although, her movement is improved she still presents with decreased motor control/coordination of RLE during gait. She is unable to ambulate without the use of an assistive device safely. High risk for falls. Recommend rehab or OP therapy.   Outcome Measures       Row Name  10/18/23 0900             How much help from another person do you currently need...    Turning from your back to your side while in flat bed without using bedrails? 4  -KJ      Moving from lying on back to sitting on the side of a flat bed without bedrails? 4  -KJ      Moving to and from a bed to a chair (including a wheelchair)? 3  -KJ      Standing up from a chair using your arms (e.g., wheelchair, bedside chair)? 3  -KJ      Climbing 3-5 steps with a railing? 3  -KJ      To walk in hospital room? 3  -KJ      AM-PAC 6 Clicks Score (PT) 20  -KJ      Highest level of mobility 6 --> Walked 10 steps or more  -KJ         Functional Assessment    Outcome Measure Options AM-PAC 6 Clicks Basic Mobility (PT)  -KJ                User Key  (r) = Recorded By, (t) = Taken By, (c) = Cosigned By      Initials Name Provider Type    Bri Hoffman PTA Physical Therapist Assistant                     Time Calculation:    PT Charges       Row Name 10/18/23 0953             Time Calculation    Start Time 0839  -KJ      Stop Time 0910  -KJ      Time Calculation (min) 31 min  -KJ      PT Received On 10/18/23  -KJ      PT Goal Re-Cert Due Date 10/27/23  -KJ         Time Calculation- PT    Total Timed Code Minutes- PT 31 minute(s)  -KJ                User Key  (r) = Recorded By, (t) = Taken By, (c) = Cosigned By      Initials Name Provider Type    Bri Hoffman PTA Physical Therapist Assistant                  Therapy Charges for Today       Code Description Service Date Service Provider Modifiers Qty    02856742757 HC GAIT TRAINING EA 15 MIN 10/17/2023 Bri Phillips, PTA GP 1    44067426438 HC PT THER PROC EA 15 MIN 10/17/2023 Bri Phillips, PTA GP 1    15300994437 HC GAIT TRAINING EA 15 MIN 10/18/2023 Bri Phillips, PTA GP 1    09831349337 HC PT THER PROC EA 15 MIN 10/18/2023 Bri Phillips, PTA GP 1            PT G-Codes  Outcome Measure Options: AM-PAC 6 Clicks Basic Mobility (PT)  AM-PAC 6 Clicks Score (PT):  20  AM-PAC 6 Clicks Score (OT): 21    Bri Phillips PTA  10/18/2023      Electronically signed by Bri Phillips PTA at 10/18/23 0926          Occupational Therapy Notes (last 48 hours)        Anastasia Chino, OTR/L at 10/18/23 1143          Patient Name: Charlene Bey  : 1955    MRN: [id number]                              Today's Date: 10/18/2023       Admit Date: 10/16/2023    Visit Dx:     ICD-10-CM ICD-9-CM   1. Gait abnormality [R26.9]  R26.9 781.2     Patient Active Problem List   Diagnosis    Paresthesia and pain of right extremity    Type 2 diabetes mellitus with hyperglycemia    Hypothyroidism (acquired)    Chronic pancreatitis    Nonintractable migraine    Dizziness, nonspecific     Past Medical History:   Diagnosis Date    Cystocele with rectocele     Diabetes mellitus     Goiter     Hashimoto's thyroiditis     Headache     Hypothyroidism     Mumps pancreatitis     Pancreatitis     Peptic ulceration     Thyroid nodule      Past Surgical History:   Procedure Laterality Date    BLADDER SUSPENSION      CHOLECYSTECTOMY      COLOSTOMY      CYSTOCELE REPAIR      HERNIA REPAIR      HYSTERECTOMY      OVARIAN CYST REMOVAL      RECTOCELE REPAIR      SMALL INTESTINE SURGERY      TONSILLECTOMY        General Information       Row Name 10/18/23 1040          OT Time and Intention    Document Type therapy note (daily note)  -     Mode of Treatment occupational therapy  -       Row Name 10/18/23 1040          General Information    Patient Profile Reviewed yes  -     Existing Precautions/Restrictions fall  -     Barriers to Rehab medically complex  -       Row Name 10/18/23 1040          Living Environment    People in Home spouse  -       Row Name 10/18/23 1040          Cognition    Orientation Status (Cognition) oriented x 4  -       Row Name 10/18/23 1040          Safety Issues, Functional Mobility    Safety Issues Affecting Function (Mobility) friction/shear risk  -     Impairments  Affecting Function (Mobility) balance;endurance/activity tolerance;pain;strength  -               User Key  (r) = Recorded By, (t) = Taken By, (c) = Cosigned By      Initials Name Provider Type     Anastasia Chino, OTR/L Occupational Therapist                     Mobility/ADL's       Row Name 10/18/23 1040          Bed Mobility    Bed Mobility supine-sit  -     Supine-Sit [place] (Bed Mobility) independent  -     Assistive Device (Bed Mobility) bed rails;head of bed elevated  -       Row Name 10/18/23 1040          Transfers    Transfers sit-stand transfer;stand-sit transfer  -       Row Name 10/18/23 1040          Sit-Stand Transfer    Sit-Stand [place] (Transfers) contact guard;verbal cues  -     Assistive Device (Sit-Stand Transfers) walker, front-wheeled  -       Row Name 10/18/23 1040          Stand-Sit Transfer    Stand-Sit [place] (Transfers) contact guard;verbal cues  -     Assistive Device (Stand-Sit Transfers) walker, front-wheeled  -       Row Name 10/18/23 1040          Functional Mobility    Functional Mobility- Ind. Level contact guard assist;verbal cues required  -     Functional Mobility- Device walker, front-wheeled  -     Functional Mobility- Comment amb in room, edu'd spouse in safety techs to assist pt while up with rwx  -       Row Name 10/18/23 1040          Activities of Daily Living    BADL Assessment/Intervention lower body dressing  -       Row Name 10/18/23 1040          Lower Body Dressing Assessment/Training    [place] Level (Lower Body Dressing) set up;don;doff;shoes/slippers;pants/bottoms  -     Position (Lower Body Dressing) edge of bed sitting;unsupported sitting;supported standing  -               User Key  (r) = Recorded By, (t) = Taken By, (c) = Cosigned By      Initials Name Provider Type     Anastasia Chino, OTR/L Occupational Therapist                   Obj/Interventions       Row Name 10/18/23 1040          Sensory Assessment  (Somatosensory)    Sensory Assessment (Somatosensory) right LE  -     Sensory Subjective Reports hypersensitivity  -     Sensory Assessment Pt. reports R LE hypersensitivity particularly to pin point & light touch  -       Row Name 10/18/23 1040          Balance    Balance Interventions sitting;standing;sit to stand;supported;static;dynamic;occupation based/functional task  -               User Key  (r) = Recorded By, (t) = Taken By, (c) = Cosigned By      Initials Name Provider Type     Anastasia Chino, OTR/L Occupational Therapist                   Goals/Plan    No documentation.                  Clinical Impression       Row Name 10/18/23 1040          Pain Assessment    Additional Documentation Pain Scale: FACES Pre/Post-Treatment (Group)  -[facility] Name 10/18/23 1040          Pain Scale: FACES Pre/Post-Treatment    Pain: FACES Scale, Pretreatment 2-->hurts little bit  -     Posttreatment Pain Rating 4-->hurts little more  -     Pain Location - Side/Orientation Right  -     Pain Location lower  -     Pain Location - extremity  -     Pre/Posttreatment Pain Comment R LE hypersensivity  -       Row Name 10/18/23 1040          Plan of Care Review    Plan of Care Reviewed With patient  -     Outcome Evaluation PT reports R LE hypersensitivity particularly with pin point & light touch. She reports difficulty tolerating her leggings - the pants most closely available to her.  Pt. spouse provided denim jeans and OTR provided edu regarding LBD dressing techs to improve indep, clothing choice to reduce risk of noxious reaction with hypersensitivity.  Pt. elected & reported that sherlyn provided most comfort.  OTR edu'd spouse in safety techs with gait belt during fxl mob to provide pt. assist and reduce her risk of falls.  OTR also provided education on positioning to reduce pain.  Cont OT tx.  -[facility] Name 10/18/23 1040          Therapy Assessment/Plan (OT)    Rehab Potential (OT) good, to  achieve stated therapy goals  -     Criteria for Skilled Therapeutic Interventions Met (OT) meets criteria;skilled treatment is necessary  -       Row Name 10/18/23 1040          Therapy Plan Review/Discharge Plan (OT)    Anticipated Discharge Disposition (OT) inpatient rehabilitation facility  -       Row Name 10/18/23 1040          Positioning and Restraints    Pre-Treatment Position in bed  -     Post Treatment Position bed  -     In Bed sitting EOB;call light within reach;encouraged to call for assist;side rails up x2;with family/caregiver  -               User Key  (r) = Recorded By, (t) = Taken By, (c) = Cosigned By      Initials Name Provider Type     Anastasia Chino, OTR/L Occupational Therapist                   Outcome Measures       Row Name 10/18/23 1040          How much help from another is currently needed...    Putting on and taking off regular lower body clothing? 3  -CH     Bathing (including washing, rinsing, and drying) 3  -CH     Toileting (which includes using toilet bed pan or urinal) 3  -CH     Putting on and taking off regular upper body clothing 4  -CH     Taking care of personal grooming (such as brushing teeth) 4  -CH     Eating meals 4  -CH     AM-PAC 6 Clicks Score (OT) 21  -CH       Row Name 10/18/23 0900 10/18/23 0723       How much help from another person do you currently need...    Turning from your back to your side while in flat bed without using bedrails? 4  -KJ 3  -KP    Moving from lying on back to sitting on the side of a flat bed without bedrails? 4  -KJ 3  -KP    Moving to and from a bed to a chair (including a wheelchair)? 3  -KJ 3  -KP    Standing up from a chair using your arms (e.g., wheelchair, bedside chair)? 3  -KJ 3  -KP    Climbing 3-5 steps with a railing? 3  -KJ 2  -KP    To walk in hospital room? 3  -KJ 3  -KP    AM-PAC 6 Clicks Score (PT) 20  -KJ 17  -KP    Highest level of mobility 6 --> Walked 10 steps or more  -KJ 5 --> Static standing  -KP       Row Name 10/18/23 1040 10/18/23 0900       Functional Assessment    Outcome Measure Options AM-PAC 6 Clicks Daily Activity (OT)  - AM-PAC 6 Clicks Basic Mobility (PT)  -[place]              User Key  (r) = Recorded By, (t) = Taken By, (c) = Cosigned By      Initials Name Provider Type     Anastasia Chino, OTR/L Occupational Therapist    Bri Hoffman, JULIO Physical Therapist Assistant    Willie Ellis, RN Registered Nurse                    Occupational Therapy Education       Title: PT OT SLP Therapies (Done)       Topic: Occupational Therapy (Done)       Point: ADL training (Done)       Description:   Instruct learner(s) on proper safety adaptation and remediation techniques during self care or transfers.   Instruct in proper use of assistive devices.                  Learning Progress Summary             Patient Acceptance, E,D, VU,NR by  at 10/18/2023 1141    Acceptance, E, VU by MB at 10/17/2023 1007                         Point: Home exercise program (Done)       Description:   Instruct learner(s) on appropriate technique for monitoring, assisting and/or progressing therapeutic exercises/activities.                  Learning Progress Summary             Patient Acceptance, E, VU by MB at 10/17/2023 1007                         Point: Precautions (Done)       Description:   Instruct learner(s) on prescribed precautions during self-care and functional transfers.                  Learning Progress Summary             Patient Acceptance, E,D, VU,NR by  at 10/18/2023 1141    Acceptance, E, VU by MB at 10/17/2023 1007                         Point: Body mechanics (Done)       Description:   Instruct learner(s) on proper positioning and spine alignment during self-care, functional mobility activities and/or exercises.                  Learning Progress Summary             Patient Acceptance, E,D, VU,NR by  at 10/18/2023 1141    Acceptance, E, VU by MB at 10/17/2023 1007                                          User Key       Initials Effective Dates Name Provider Type Discipline     07/11/23 -  Anastasia Chino OTR/L Occupational Therapist OT    MB 08/04/23 -  Misha Smith OT Student OT Student OT                  OT Recommendation and Plan     Plan of Care Review  Plan of Care Reviewed With: patient  Outcome Evaluation: PT reports R LE hypersensitivity particularly with pin point & light touch. She reports difficulty tolerating her leggings - the pants most closely available to her.  Pt. spouse provided sherlyn jemi and OTR provided edu regarding LBD dressing techs to improve indep, clothing choice to reduce risk of noxious reaction with hypersensitivity.  Pt. elected & reported that sherlyn provided most comfort.  OTR edu'd spouse in safety techs with gait belt during fxl mob to provide pt. assist and reduce her risk of falls.  OTR also provided education on positioning to reduce pain.  Cont OT tx.     Time Calculation:         Time Calculation- OT       Row Name 10/18/23 1040             Time Calculation- OT    OT Start Time 1040  -CH      OT Stop Time 1130  -CH      OT Time Calculation (min) 50 min  -CH      OT Received On 10/18/23  -         Timed Charges    66847 - OT Self Care/Mgmt Minutes 50  -CH         Total Minutes    Timed Charges Total Minutes 50  -CH       Total Minutes 50  -CH                User Key  (r) = Recorded By, (t) = Taken By, (c) = Cosigned By      Initials Name Provider Type     Anastasia Chino OTR/L Occupational Therapist                  Therapy Charges for Today       Code Description Service Date Service Provider Modifiers Qty    68457006721 HC OT SELF CARE/MGMT/TRAIN EA 15 MIN 10/18/2023 Anastasia Chino OTR/MARCY GO 3                 RADHA Hermosillo/L  10/18/2023    Electronically signed by Anastasia Chino OTR/L at 10/18/23 1143       Anastasia Chino OTR/L at 10/18/23 1141          Goal Outcome Evaluation:  Plan of Care Reviewed With: patient           Outcome Evaluation:  PT reports R LE hypersensitivity particularly with pin point & light touch. She reports difficulty tolerating her leggings - the pants most closely available to her.  Pt. spouse provided sherlyn jemi and OTR provided edu regarding LBD dressing techs to improve indep, clothing choice to reduce risk of noxious reaction with hypersensitivity.  Pt. elected & reported that sherlyn provided most comfort.  OTR edu'd spouse in safety techs with gait belt during fxl mob to provide pt. assist and reduce her risk of falls.  OTR also provided education on positioning to reduce pain.  Cont OT tx.      Anticipated Discharge Disposition (OT): inpatient rehabilitation facility    Electronically signed by Anastasia Chino, OTR/L at 10/18/23 9853

## 2023-10-18 NOTE — PLAN OF CARE
Goal Outcome Evaluation:  Plan of Care Reviewed With: patient        Progress: no change  Outcome Evaluation: VSS. Pt is A&Ox4. n/t to RLE, no changes in NVs. Up x1 w/ walker. C/o back and RLE pain and discomfort, relief w/ prns and repositioning w/ pillow support. Tele and SCDs in place. Resting comfortably. Safety precautions maintained.

## 2023-10-18 NOTE — THERAPY TREATMENT NOTE
Acute Care - Physical Therapy Treatment Note   [facility]     Patient Name: Charlene Bey  : 1955  MRN: [id number]  Today's Date: 10/18/2023      Visit Dx:     ICD-10-CM ICD-9-CM   1. Gait abnormality [R26.9]  R26.9 781.2     Patient Active Problem List   Diagnosis    Paresthesia and pain of right extremity    Type 2 diabetes mellitus with hyperglycemia    Hypothyroidism (acquired)    Chronic pancreatitis    Nonintractable migraine    Dizziness, nonspecific     Past Medical History:   Diagnosis Date    Cystocele with rectocele     Diabetes mellitus     Goiter     Hashimoto's thyroiditis     Headache     Hypothyroidism     Mumps pancreatitis     Pancreatitis     Peptic ulceration     Thyroid nodule      Past Surgical History:   Procedure Laterality Date    BLADDER SUSPENSION      CHOLECYSTECTOMY      COLOSTOMY      CYSTOCELE REPAIR      HERNIA REPAIR      HYSTERECTOMY      OVARIAN CYST REMOVAL      RECTOCELE REPAIR      SMALL INTESTINE SURGERY      TONSILLECTOMY       PT Assessment (last 12 hours)       PT Evaluation and Treatment       Row Name 10/18/23 [facility] 10/18/23 0839       Physical Therapy Time and Intention    Subjective Information no complaints  -KJ --  states she does feel stronger today  -KJ    Document Type therapy note (daily note)  -KJ therapy note (daily note)  -KJ    Mode of Treatment physical therapy  -KJ physical therapy  -KJ    Patient Effort good  -KJ good  -KJ      Row Name 10/18/23 [facility] 10/18/23 0839       General Information    Existing Precautions/Restrictions fall  RLE weakness; decreased motor control RLE  -KJ fall  RLE weakness; decreased motor control RLE  -KJ      Row Name 10/18/23 [facility] 10/18/23 0839       Pain    Pretreatment Pain Rating 0/10 - no pain  -KJ 0/10 - no pain  -KJ    Posttreatment Pain Rating 0/10 - no pain  -KJ 0/10 - no pain  -KJ      Row Name 10/18/23 [facility] 10/18/23 0839       Bed Mobility    Supine-Sit [place] (Bed Mobility) independent  -KJ independent  -KJ       Row Name 10/18/23 1430 10/18/23 0839       Sit-Stand Transfer    Sit-Stand [place] (Transfers) contact guard;verbal cues  -KJ contact guard;verbal cues  -KJ    Assistive Device (Sit-Stand Transfers) walker, front-wheeled  -KJ walker, front-wheeled  -KJ      Row Name 10/18/23 1430 10/18/23 0839       Stand-Sit Transfer    Stand-Sit [place] (Transfers) supervision  -KJ supervision  -KJ    Assistive Device (Stand-Sit Transfers) walker, front-wheeled  -KJ walker, front-wheeled  -KJ      Row Name 10/18/23 1430 10/18/23 0839       Gait/Stairs (Locomotion)    [place] Level (Gait) verbal cues;minimum assist (75% patient effort)  -KJ verbal cues;minimum assist (75% patient effort)  -KJ    Assistive Device (Gait) walker, front-wheeled  -KJ --    Distance in Feet (Gait) 10' without walker, pt requires min/mod assist for amb. 25' with r wx [place] for balance and decreased balance with turns  -KJ 10 steps without wx, required min/mod assist for balance. 30' with Rwx CG/Antonio  -KJ    Deviations/Abnormal Patterns (Gait) ataxic;gait speed decreased;steppage;stride length decreased;weight shifting decreased  -KJ ataxic;gait speed decreased;steppage;stride length decreased;weight shifting decreased  -KJ    Bilateral Gait Deviations heel strike decreased  -KJ --    Right Sided Gait Deviations heel strike decreased;weight shift ability decreased  -KJ heel strike decreased;weight shift ability decreased  -KJ    Comment, (Gait/Stairs) -- continued decreased normal motor planning of RLE during gait. Decreased hip/knee flex unless over exaggerated. Unable safely to ambulate without the use of wx  -KJ      Row Name 10/18/23 0839          Balance    Balance Assessment sitting static balance  -KJ     Static Sitting Balance independent  -KJ     Dynamic Sitting Balance independent  -KJ     Position, Sitting Balance sitting edge of bed;unsupported  -KJ     Sit to Stand Dynamic Balance contact guard  -KJ     Static Standing  Balance contact guard  -KJ     Dynamic Standing Balance minimal assist;moderate assist  -KJ     Position/Device Used, Standing Balance supported  -KJ       Row Name 10/18/23 1430 10/18/23 0839       Aerobic Exercise    Comment, Aerobic Exercise (Therapeutic Exercise) standing at rail, performed standing lateral weight shifting, A&P shifting, tandem stance, step forward/backward weight shifting, squats, toe raises, dynamic/static standing perturbations.  -KJ standing hip flex,abd, perturbations no holding to on for support, tandem stance unable to hold. A & P weight shifts, lateral weight shifts.  -KJ      Row Name 10/18/23 1430 10/18/23 0839       Positioning and Restraints    Pre-Treatment Position in bed  -KJ in bed  -KJ    Post Treatment Position bed  -KJ chair  -KJ    In Bed call light within reach  -KJ call light within reach  -KJ              User Key  (r) = Recorded By, (t) = Taken By, (c) = Cosigned By      Initials Name Provider Type    Bri Hoffman PTA Physical Therapist Assistant                    Physical Therapy Education       Title: PT OT SLP Therapies (Done)       Topic: Physical Therapy (Done)       Point: Mobility training (Done)       Learning Progress Summary             Patient Acceptance, E,TB,D, LILIBETH,CARMEN,NR by GLENNA at 10/17/2023 1014    Comment: education re: purpose of PT/importance of activity, for safety/falls prevention, proper use of rwx w/ emphasis on gait sequencing, placement of rwx, hand placement w/ transitions, T/I AA HS and quad sets w/ return demo                         Point: Home exercise program (Done)       Learning Progress Summary             Patient Acceptance, E,TB,D, LILIBETH,CARMEN,NR by GLENNA at 10/17/2023 1014    Comment: education re: purpose of PT/importance of activity, for safety/falls prevention, proper use of rwx w/ emphasis on gait sequencing, placement of rwx, hand placement w/ transitions, T/I AA HS and quad sets w/ return demo                         Point:  Precautions (Done)       Learning Progress Summary             Patient Acceptance, E,TB,D, VU,DU,NR by GLENNA at 10/17/2023 1014    Comment: education re: purpose of PT/importance of activity, for safety/falls prevention, proper use of rwx w/ emphasis on gait sequencing, placement of rwx, hand placement w/ transitions, T/I AA HS and quad sets w/ return demo                                         User Key       Initials Effective Dates Name Provider Type Discipline     08/02/18 -  Tiffani York, PT Physical Therapist PT                  PT Recommendation and Plan     Plan of Care Reviewed With: patient  Progress: improving  Outcome Evaluation: PT tx completed. Pt reports she is moving RLE better and feels stronger. Although, her movement is improved she still presents with decreased motor control/coordination of RLE during gait. She is unable to ambulate without the use of an assistive device safely. High risk for falls. Recommend rehab or OP therapy.   Outcome Measures       Row Name 10/18/23 0900             How much help from another person do you currently need...    Turning from your back to your side while in flat bed without using bedrails? 4  -KJ      Moving from lying on back to sitting on the side of a flat bed without bedrails? 4  -KJ      Moving to and from a bed to a chair (including a wheelchair)? 3  -KJ      Standing up from a chair using your arms (e.g., wheelchair, bedside chair)? 3  -KJ      Climbing 3-5 steps with a railing? 3  -KJ      To walk in hospital room? 3  -KJ      AM-PAC 6 Clicks Score (PT) 20  -KJ      Highest level of mobility 6 --> Walked 10 steps or more  -KJ         Functional Assessment    Outcome Measure Options AM-PAC 6 Clicks Basic Mobility (PT)  -KJ                User Key  (r) = Recorded By, (t) = Taken By, (c) = Cosigned By      Initials Name Provider Type    Bri Hoffman, PTA Physical Therapist Assistant                     Time Calculation:    PT Charges       Row  Name 10/18/23 1521 10/18/23 0953          Time Calculation    Start Time 1430  -KJ 0839  -KJ     Stop Time 1511  -KJ 0910  -KJ     Time Calculation (min) 41 min  -KJ 31 min  -KJ     PT Received On -- 10/18/23  -KJ     PT Goal Re-Cert Due Date -- 10/27/23  -KJ        Time Calculation- PT    Total Timed Code Minutes- PT -- 31 minute(s)  -KJ               User Key  (r) = Recorded By, (t) = Taken By, (c) = Cosigned By      Initials Name Provider Type    Bri Hoffman, PTA Physical Therapist Assistant                  Therapy Charges for Today       Code Description Service Date Service Provider Modifiers Qty    33525422610 HC GAIT TRAINING EA 15 MIN 10/17/2023 Bri Phillips, PTA GP 1    07969088605 HC PT THER PROC EA 15 MIN 10/17/2023 Bri Phillips, PTA GP 1    93899348001 HC GAIT TRAINING EA 15 MIN 10/18/2023 Bri Phillips, PTA GP 1    43951643342 HC PT THER PROC EA 15 MIN 10/18/2023 Bri Phillips, PTA GP 1    85087245524 HC GAIT TRAINING EA 15 MIN 10/18/2023 Bri Phillips, PTA GP 1    12022793112 HC PT THER PROC EA 15 MIN 10/18/2023 Bri Phillips, PTA GP 2            PT G-Codes  Outcome Measure Options: AM-PAC 6 Clicks Daily Activity (OT)  AM-PAC 6 Clicks Score (PT): 20  AM-PAC 6 Clicks Score (OT): 21    Bri Phillips PTA  10/18/2023

## 2023-10-18 NOTE — THERAPY TREATMENT NOTE
Patient Name: hCarlene Bey  : 1955    MRN: [id number]                              Today's Date: 10/18/2023       Admit Date: 10/16/2023    Visit Dx:     ICD-10-CM ICD-9-CM   1. Gait abnormality [R26.9]  R26.9 781.2     Patient Active Problem List   Diagnosis    Paresthesia and pain of right extremity    Type 2 diabetes mellitus with hyperglycemia    Hypothyroidism (acquired)    Chronic pancreatitis    Nonintractable migraine    Dizziness, nonspecific     Past Medical History:   Diagnosis Date    Cystocele with rectocele     Diabetes mellitus     Goiter     Hashimoto's thyroiditis     Headache     Hypothyroidism     Mumps pancreatitis     Pancreatitis     Peptic ulceration     Thyroid nodule      Past Surgical History:   Procedure Laterality Date    BLADDER SUSPENSION      CHOLECYSTECTOMY      COLOSTOMY      CYSTOCELE REPAIR      HERNIA REPAIR      HYSTERECTOMY      OVARIAN CYST REMOVAL      RECTOCELE REPAIR      SMALL INTESTINE SURGERY      TONSILLECTOMY        General Information       Row Name 10/18/23 1040          OT Time and Intention    Document Type therapy note (daily note)  -     Mode of Treatment occupational therapy  -       Row Name 10/18/23 1040          General Information    Patient Profile Reviewed yes  -     Existing Precautions/Restrictions fall  -     Barriers to Rehab medically complex  -       Row Name 10/18/23 1040          Living Environment    People in Home spouse  -       Row Name 10/18/23 1040          Cognition    Orientation Status (Cognition) oriented x 4  -       Row Name 10/18/23 1040          Safety Issues, Functional Mobility    Safety Issues Affecting Function (Mobility) friction/shear risk  -     Impairments Affecting Function (Mobility) balance;endurance/activity tolerance;pain;strength  -               User Key  (r) = Recorded By, (t) = Taken By, (c) = Cosigned By      Initials Name Provider Type     Anastasia Chino, OTR/L Occupational Therapist                      Mobility/ADL's       [facility] Name 10/18/23 1040          Bed Mobility    Bed Mobility supine-sit  -     Supine-Sit [place] (Bed Mobility) independent  -     Assistive Device (Bed Mobility) bed rails;head of bed elevated  -[facility] Name 10/18/23 1040          Transfers    Transfers sit-stand transfer;stand-sit transfer  -[facility] Name 10/18/23 1040          Sit-Stand Transfer    Sit-Stand [place] (Transfers) contact guard;verbal cues  -     Assistive Device (Sit-Stand Transfers) walker, front-wheeled  -       Row Name 10/18/23 1040          Stand-Sit Transfer    Stand-Sit [place] (Transfers) contact guard;verbal cues  -     Assistive Device (Stand-Sit Transfers) walker, front-wheeled  -[facility] Name 10/18/23 1040          Functional Mobility    Functional Mobility- Ind. Level contact guard assist;verbal cues required  -     Functional Mobility- Device walker, front-wheeled  -     Functional Mobility- Comment amb in room, edu'd spouse in safety techs to assist pt while up with rwx  -[facility] Name 10/18/23 1040          Activities of Daily Living    BADL Assessment/Intervention lower body dressing  -[facility] Name 10/18/23 1040          Lower Body Dressing Assessment/Training    [place] Level (Lower Body Dressing) set up;don;doff;shoes/slippers;pants/bottoms  -     Position (Lower Body Dressing) edge of bed sitting;unsupported sitting;supported standing  -               User Key  (r) = Recorded By, (t) = Taken By, (c) = Cosigned By      Initials Name Provider Type     Anastasia Chino, OTR/L Occupational Therapist                   Obj/Interventions       [facility] Name 10/18/23 1040          Sensory Assessment (Somatosensory)    Sensory Assessment (Somatosensory) right LE  -     Sensory Subjective Reports hypersensitivity  -     Sensory Assessment Pt. reports R LE hypersensitivity particularly to pin point & light touch  -       Row Name 10/18/23 1040           Balance    Balance Interventions sitting;standing;sit to stand;supported;static;dynamic;occupation based/functional task  -               User Key  (r) = Recorded By, (t) = Taken By, (c) = Cosigned By      Initials Name Provider Type     Anastasia Chino, OTR/L Occupational Therapist                   Goals/Plan    No documentation.                  Clinical Impression       Row Name 10/18/23 1040          Pain Assessment    Additional Documentation Pain Scale: FACES Pre/Post-Treatment (Group)  -       Row Name 10/18/23 1040          Pain Scale: FACES Pre/Post-Treatment    Pain: FACES Scale, Pretreatment 2-->hurts little bit  -     Posttreatment Pain Rating 4-->hurts little more  -     Pain Location - Side/Orientation Right  -     Pain Location lower  -     Pain Location - extremity  -     Pre/Posttreatment Pain Comment R LE hypersensivity  -       Row Name 10/18/23 1040          Plan of Care Review    Plan of Care Reviewed With patient  -     Outcome Evaluation PT reports R LE hypersensitivity particularly with pin point & light touch. She reports difficulty tolerating her leggings - the pants most closely available to her.  Pt. spouse provided sherlyn jemi and OTR provided edu regarding LBD dressing techs to improve indep, clothing choice to reduce risk of noxious reaction with hypersensitivity.  Pt. elected & reported that sherlyn provided most comfort.  OTR edu'd spouse in safety techs with gait belt during fxl mob to provide pt. assist and reduce her risk of falls.  OTR also provided education on positioning to reduce pain.  Cont OT tx.  -       Row Name 10/18/23 1040          Therapy Assessment/Plan (OT)    Rehab Potential (OT) good, to achieve stated therapy goals  -     Criteria for Skilled Therapeutic Interventions Met (OT) meets criteria;skilled treatment is necessary  -       Row Name 10/18/23 1040          Therapy Plan Review/Discharge Plan (OT)    Anticipated Discharge  Disposition (OT) inpatient rehabilitation facility  -       Row Name 10/18/23 1040          Positioning and Restraints    Pre-Treatment Position in bed  -     Post Treatment Position bed  -     In Bed sitting EOB;call light within reach;encouraged to call for assist;side rails up x2;with family/caregiver  -               User Key  (r) = Recorded By, (t) = Taken By, (c) = Cosigned By      Initials Name Provider Type     Anastasia Chino, OTR/L Occupational Therapist                   Outcome Measures       Row Name 10/18/23 1040          How much help from another is currently needed...    Putting on and taking off regular lower body clothing? 3  -CH     Bathing (including washing, rinsing, and drying) 3  -CH     Toileting (which includes using toilet bed pan or urinal) 3  -CH     Putting on and taking off regular upper body clothing 4  -CH     Taking care of personal grooming (such as brushing teeth) 4  -CH     Eating meals 4  -CH     AM-PAC 6 Clicks Score (OT) 21  -CH       Row Name 10/18/23 0900 10/18/23 0723       How much help from another person do you currently need...    Turning from your back to your side while in flat bed without using bedrails? 4  -KJ 3  -KP    Moving from lying on back to sitting on the side of a flat bed without bedrails? 4  -KJ 3  -KP    Moving to and from a bed to a chair (including a wheelchair)? 3  -KJ 3  -KP    Standing up from a chair using your arms (e.g., wheelchair, bedside chair)? 3  -KJ 3  -KP    Climbing 3-5 steps with a railing? 3  -KJ 2  -KP    To walk in hospital room? 3  -KJ 3  -KP    AM-PAC 6 Clicks Score (PT) 20  -KJ 17  -KP    Highest level of mobility 6 --> Walked 10 steps or more  -KJ 5 --> Static standing  -KP      Row Name 10/18/23 1040 10/18/23 0900       Functional Assessment    Outcome Measure Options AM-PAC 6 Clicks Daily Activity (OT)  -CH AM-PAC 6 Clicks Basic Mobility (PT)  -KJ              User Key  (r) = Recorded By, (t) = Taken By, (c) = Cosigned  By      Initials Name Provider Type     Anastasia Chino, OTR/L Occupational Therapist    Bri Hoffman, PTA Physical Therapist Assistant    Willie Ellis, RN Registered Nurse                    Occupational Therapy Education       Title: PT OT SLP Therapies (Done)       Topic: Occupational Therapy (Done)       Point: ADL training (Done)       Description:   Instruct learner(s) on proper safety adaptation and remediation techniques during self care or transfers.   Instruct in proper use of assistive devices.                  Learning Progress Summary             Patient Acceptance, E,D, VU,NR by  at 10/18/2023 1141    Acceptance, E, VU by MB at 10/17/2023 1007                         Point: Home exercise program (Done)       Description:   Instruct learner(s) on appropriate technique for monitoring, assisting and/or progressing therapeutic exercises/activities.                  Learning Progress Summary             Patient Acceptance, E, VU by MB at 10/17/2023 1007                         Point: Precautions (Done)       Description:   Instruct learner(s) on prescribed precautions during self-care and functional transfers.                  Learning Progress Summary             Patient Acceptance, E,D, VU,NR by  at 10/18/2023 1141    Acceptance, E, VU by MB at 10/17/2023 1007                         Point: Body mechanics (Done)       Description:   Instruct learner(s) on proper positioning and spine alignment during self-care, functional mobility activities and/or exercises.                  Learning Progress Summary             Patient Acceptance, E,D, VU,NR by  at 10/18/2023 1141    Acceptance, E, VU by MB at 10/17/2023 1007                                         User Key       Initials Effective Dates Name Provider Type [facility] 07/11/23 -  Anastasia Chino, OTR/L Occupational Therapist OT    MB 08/04/23 -  Misha Smith OT Student OT Student OT                  OT Recommendation and  Plan     Plan of Care Review  Plan of Care Reviewed With: patient  Outcome Evaluation: PT reports R LE hypersensitivity particularly with pin point & light touch. She reports difficulty tolerating her leggings - the pants most closely available to her.  Pt. spouse provided denim jeans and OTR provided edu regarding LBD dressing techs to improve indep, clothing choice to reduce risk of noxious reaction with hypersensitivity.  Pt. elected & reported that sherlyn provided most comfort.  OTR edu'd spouse in safety techs with gait belt during fxl mob to provide pt. assist and reduce her risk of falls.  OTR also provided education on positioning to reduce pain.  Cont OT tx.     Time Calculation:         Time Calculation- OT       Row Name 10/18/23 1040             Time Calculation- OT    OT Start Time 1040  -CH      OT Stop Time 1130  -CH      OT Time Calculation (min) 50 min  -CH      OT Received On 10/18/23  -         Timed Charges    39714 - OT Self Care/Mgmt Minutes 50  -CH         Total Minutes    Timed Charges Total Minutes 50  -CH       Total Minutes 50  -CH                User Key  (r) = Recorded By, (t) = Taken By, (c) = Cosigned By      Initials Name Provider Type     Anastasia Chino OTR/L Occupational Therapist                  Therapy Charges for Today       Code Description Service Date Service Provider Modifiers Qty    30111973545 HC OT SELF CARE/MGMT/TRAIN EA 15 MIN 10/18/2023 Anastasia Chino OTR/MARCY GO 3                 RADHA Hermosillo/MARCY  10/18/2023

## 2023-10-19 LAB
ALBUMIN SERPL-MCNC: 4.3 G/DL (ref 3.5–5.2)
ALBUMIN/GLOB SERPL: 1.7 G/DL
ALP SERPL-CCNC: 72 U/L (ref 39–117)
ALT SERPL W P-5'-P-CCNC: 16 U/L (ref 1–33)
ANION GAP SERPL CALCULATED.3IONS-SCNC: 10 MMOL/L (ref 5–15)
AST SERPL-CCNC: 15 U/L (ref 1–32)
BASOPHILS # BLD AUTO: 0.04 10*3/MM3 (ref 0–0.2)
BASOPHILS NFR BLD AUTO: 0.5 % (ref 0–1.5)
BILIRUB SERPL-MCNC: 0.2 MG/DL (ref 0–1.2)
BUN SERPL-MCNC: 16 MG/DL (ref 8–23)
BUN/CREAT SERPL: 26.2 (ref 7–25)
CALCIUM SPEC-SCNC: 9.5 MG/DL (ref 8.6–10.5)
CHLORIDE SERPL-SCNC: 104 MMOL/L (ref 98–107)
CO2 SERPL-SCNC: 26 MMOL/L (ref 22–29)
CREAT SERPL-MCNC: 0.61 MG/DL (ref 0.57–1)
DEPRECATED RDW RBC AUTO: 44.2 FL (ref 37–54)
EGFRCR SERPLBLD CKD-EPI 2021: 98.1 ML/MIN/1.73
EOSINOPHIL # BLD AUTO: 0.14 10*3/MM3 (ref 0–0.4)
EOSINOPHIL NFR BLD AUTO: 1.8 % (ref 0.3–6.2)
ERYTHROCYTE [DISTWIDTH] IN BLOOD BY AUTOMATED COUNT: 14.5 % (ref 12.3–15.4)
GLOBULIN UR ELPH-MCNC: 2.6 GM/DL
GLUCOSE BLDC GLUCOMTR-MCNC: 162 MG/DL (ref 70–130)
GLUCOSE BLDC GLUCOMTR-MCNC: 170 MG/DL (ref 70–130)
GLUCOSE BLDC GLUCOMTR-MCNC: 198 MG/DL (ref 70–130)
GLUCOSE BLDC GLUCOMTR-MCNC: 209 MG/DL (ref 70–130)
GLUCOSE SERPL-MCNC: 199 MG/DL (ref 65–99)
HCT VFR BLD AUTO: 40.3 % (ref 34–46.6)
HGB BLD-MCNC: 13 G/DL (ref 12–15.9)
IMM GRANULOCYTES # BLD AUTO: 0.03 10*3/MM3 (ref 0–0.05)
IMM GRANULOCYTES NFR BLD AUTO: 0.4 % (ref 0–0.5)
LYMPHOCYTES # BLD AUTO: 2.68 10*3/MM3 (ref 0.7–3.1)
LYMPHOCYTES NFR BLD AUTO: 34.4 % (ref 19.6–45.3)
MCH RBC QN AUTO: 26.9 PG (ref 26.6–33)
MCHC RBC AUTO-ENTMCNC: 32.3 G/DL (ref 31.5–35.7)
MCV RBC AUTO: 83.4 FL (ref 79–97)
MONOCYTES # BLD AUTO: 0.6 10*3/MM3 (ref 0.1–0.9)
MONOCYTES NFR BLD AUTO: 7.7 % (ref 5–12)
NEUTROPHILS NFR BLD AUTO: 4.29 10*3/MM3 (ref 1.7–7)
NEUTROPHILS NFR BLD AUTO: 55.2 % (ref 42.7–76)
NRBC BLD AUTO-RTO: 0 /100 WBC (ref 0–0.2)
PLATELET # BLD AUTO: 174 10*3/MM3 (ref 140–450)
PMV BLD AUTO: 9.7 FL (ref 6–12)
POTASSIUM SERPL-SCNC: 4.1 MMOL/L (ref 3.5–5.2)
PROT SERPL-MCNC: 6.9 G/DL (ref 6–8.5)
RBC # BLD AUTO: 4.83 10*6/MM3 (ref 3.77–5.28)
SODIUM SERPL-SCNC: 140 MMOL/L (ref 136–145)
WBC NRBC COR # BLD: 7.78 10*3/MM3 (ref 3.4–10.8)

## 2023-10-19 PROCEDURE — 97530 THERAPEUTIC ACTIVITIES: CPT

## 2023-10-19 PROCEDURE — 97110 THERAPEUTIC EXERCISES: CPT

## 2023-10-19 PROCEDURE — G0378 HOSPITAL OBSERVATION PER HR: HCPCS

## 2023-10-19 PROCEDURE — 63710000001 INSULIN LISPRO (HUMAN) PER 5 UNITS: Performed by: STUDENT IN AN ORGANIZED HEALTH CARE EDUCATION/TRAINING PROGRAM

## 2023-10-19 PROCEDURE — 97116 GAIT TRAINING THERAPY: CPT

## 2023-10-19 PROCEDURE — 82948 REAGENT STRIP/BLOOD GLUCOSE: CPT

## 2023-10-19 PROCEDURE — 80053 COMPREHEN METABOLIC PANEL: CPT | Performed by: STUDENT IN AN ORGANIZED HEALTH CARE EDUCATION/TRAINING PROGRAM

## 2023-10-19 PROCEDURE — 85025 COMPLETE CBC W/AUTO DIFF WBC: CPT | Performed by: STUDENT IN AN ORGANIZED HEALTH CARE EDUCATION/TRAINING PROGRAM

## 2023-10-19 PROCEDURE — 97535 SELF CARE MNGMENT TRAINING: CPT

## 2023-10-19 PROCEDURE — 63710000001 INSULIN DETEMIR PER 5 UNITS: Performed by: STUDENT IN AN ORGANIZED HEALTH CARE EDUCATION/TRAINING PROGRAM

## 2023-10-19 PROCEDURE — 99233 SBSQ HOSP IP/OBS HIGH 50: CPT | Performed by: CLINICAL NURSE SPECIALIST

## 2023-10-19 RX ADMIN — Medication 10 ML: at 08:37

## 2023-10-19 RX ADMIN — INSULIN LISPRO 5 UNITS: 100 INJECTION, SOLUTION INTRAVENOUS; SUBCUTANEOUS at 22:04

## 2023-10-19 RX ADMIN — PANCRELIPASE 12000 UNITS OF LIPASE: 60000; 12000; 38000 CAPSULE, DELAYED RELEASE PELLETS ORAL at 11:57

## 2023-10-19 RX ADMIN — Medication 5 MG: at 22:03

## 2023-10-19 RX ADMIN — ATORVASTATIN CALCIUM 80 MG: 40 TABLET ORAL at 22:04

## 2023-10-19 RX ADMIN — Medication 5000 UNITS: at 08:35

## 2023-10-19 RX ADMIN — PANCRELIPASE 12000 UNITS OF LIPASE: 60000; 12000; 38000 CAPSULE, DELAYED RELEASE PELLETS ORAL at 17:49

## 2023-10-19 RX ADMIN — GABAPENTIN 300 MG: 300 CAPSULE ORAL at 08:13

## 2023-10-19 RX ADMIN — METHOCARBAMOL 500 MG: 500 TABLET, FILM COATED ORAL at 22:04

## 2023-10-19 RX ADMIN — Medication 1000 MCG: at 08:36

## 2023-10-19 RX ADMIN — LEVOTHYROXINE SODIUM 175 MCG: 150 TABLET ORAL at 05:40

## 2023-10-19 RX ADMIN — INSULIN LISPRO 3 UNITS: 100 INJECTION, SOLUTION INTRAVENOUS; SUBCUTANEOUS at 08:12

## 2023-10-19 RX ADMIN — SERTRALINE HYDROCHLORIDE 100 MG: 100 TABLET, FILM COATED ORAL at 08:36

## 2023-10-19 RX ADMIN — Medication 27 MG: at 22:04

## 2023-10-19 RX ADMIN — INSULIN LISPRO 3 UNITS: 100 INJECTION, SOLUTION INTRAVENOUS; SUBCUTANEOUS at 17:23

## 2023-10-19 RX ADMIN — Medication 10 ML: at 22:04

## 2023-10-19 RX ADMIN — GABAPENTIN 300 MG: 300 CAPSULE ORAL at 15:02

## 2023-10-19 RX ADMIN — DOCUSATE SODIUM 50 MG AND SENNOSIDES 8.6 MG 2 TABLET: 8.6; 5 TABLET, FILM COATED ORAL at 08:35

## 2023-10-19 RX ADMIN — GABAPENTIN 300 MG: 300 CAPSULE ORAL at 22:03

## 2023-10-19 RX ADMIN — MONTELUKAST 10 MG: 10 TABLET, FILM COATED ORAL at 22:04

## 2023-10-19 RX ADMIN — Medication 1 CAPSULE: at 08:36

## 2023-10-19 RX ADMIN — INSULIN LISPRO 3 UNITS: 100 INJECTION, SOLUTION INTRAVENOUS; SUBCUTANEOUS at 11:56

## 2023-10-19 RX ADMIN — INSULIN DETEMIR 10 UNITS: 100 INJECTION, SOLUTION SUBCUTANEOUS at 22:04

## 2023-10-19 RX ADMIN — Medication 27 MG: at 08:36

## 2023-10-19 RX ADMIN — PANCRELIPASE 12000 UNITS OF LIPASE: 60000; 12000; 38000 CAPSULE, DELAYED RELEASE PELLETS ORAL at 07:56

## 2023-10-19 RX ADMIN — DOCUSATE SODIUM 50 MG AND SENNOSIDES 8.6 MG 2 TABLET: 8.6; 5 TABLET, FILM COATED ORAL at 22:03

## 2023-10-19 NOTE — CASE MANAGEMENT/SOCIAL WORK
Continued Stay Note   Luca     Patient Name: Charlene Bey  MRN: [id number]  Today's Date: 10/19/2023    Admit Date: 10/16/2023        Discharge Plan       Row Name 10/19/23 1521       Plan    Plan Comments [facility], she is still waiting on Physician to make a decision. Latha states she will update SW once answer received.                   Discharge Codes    No documentation.                       FARHAN Chaudhry

## 2023-10-19 NOTE — PLAN OF CARE
Goal Outcome Evaluation:  Plan of Care Reviewed With: patient        Progress: no change  Outcome Evaluation: VSS. Pt is A&Ox4. No changes in NVs, n/t to RLE. Up x1 w/ walker. No c/o pain or discomfort noted throughout shift. Tele and SCDs in place. Resting comfortably. Safety pecautions maintained.

## 2023-10-19 NOTE — CASE MANAGEMENT/SOCIAL WORK
Continued Stay Note   Myrtle     Patient Name: Charlene Bey  MRN: [id number]  Today's Date: 10/19/2023    Admit Date: 10/16/2023        Discharge Plan       Row Name 10/19/23 0924       Plan    Plan Comments FLAQUITA spoke to Latha with [facility] ([phone]) regarding referral. Latha states they are reviewing and she has requested updates. Updates have been faxed to Latha, await answer. If bed offered, precert will be started.                   Discharge Codes    No documentation.                       FARHAN Chaudhry

## 2023-10-19 NOTE — THERAPY TREATMENT NOTE
Acute Care - Occupational Therapy Treatment Note  [facility]     Patient Name: Charlene Bey  : 1955  MRN: [id number]  Today's Date: 10/19/2023             Admit Date: 10/16/2023       ICD-10-CM ICD-9-CM   1. Gait abnormality [R26.9]  R26.9 781.2     Patient Active Problem List   Diagnosis    Paresthesia and pain of right extremity    Type 2 diabetes mellitus with hyperglycemia    Hypothyroidism (acquired)    Chronic pancreatitis    Nonintractable migraine    Dizziness, nonspecific     Past Medical History:   Diagnosis Date    Cystocele with rectocele     Diabetes mellitus     Goiter     Hashimoto's thyroiditis     Headache     Hypothyroidism     Mumps pancreatitis     Pancreatitis     Peptic ulceration     Thyroid nodule      Past Surgical History:   Procedure Laterality Date    BLADDER SUSPENSION      CHOLECYSTECTOMY      COLOSTOMY      CYSTOCELE REPAIR      HERNIA REPAIR      HYSTERECTOMY      OVARIAN CYST REMOVAL      RECTOCELE REPAIR      SMALL INTESTINE SURGERY      TONSILLECTOMY           OT ASSESSMENT FLOWSHEET (last 12 hours)       OT Evaluation and Treatment       Row Name 10/19/23 1202                   OT Time and Intention    Subjective Information no complaints  -EC        Document Type therapy note (daily note)  -EC        Mode of Treatment occupational therapy  -EC        Patient Effort good  -EC           General Information    Patient Profile Reviewed yes  -EC           Pain Assessment    Pretreatment Pain Rating 0/10 - no pain  -EC        Posttreatment Pain Rating 0/10 - no pain  -EC        Pain Intervention(s) Medication (See MAR);Repositioned;Ambulation/increased activity  -EC           Cognition    Orientation Status (Cognition) oriented x 4  -EC        Personal Safety Interventions fall prevention program maintained;gait belt;nonskid shoes/slippers when out of bed;supervised activity  -EC           Activities of Daily Living    BADL Assessment/Intervention toileting;grooming;lower body  dressing  -EC           Lower Body Dressing Assessment/Training    [place] Level (Lower Body Dressing) don;shoes/slippers;standby assist  -EC        Position (Lower Body Dressing) edge of bed sitting;unsupported sitting  -EC           Grooming Assessment/Training    [place] Level (Grooming) wash face, hands;oral care regimen;standby assist  -EC        Position (Grooming) supported standing;sink side  -EC           Toileting Assessment/Training    [place] Level (Toileting) adjust/manage clothing;perform perineal hygiene;standby assist  -EC        Assistive Devices (Toileting) commode;grab bar/safety frame  -EC        Position (Toileting) supported sitting;supported standing  -EC           Functional Mobility    Functional Mobility- Ind. Level contact guard assist  -EC        Functional Mobility- Device walker, front-wheeled  -EC        Functional Mobility- Comment amb in room & BR  -EC           Transfer Assessment/Treatment    Transfers sit-stand transfer;stand-sit transfer;toilet transfer  -EC           Sit-Stand Transfer    Sit-Stand [place] (Transfers) contact guard;verbal cues  -EC        Assistive Device (Sit-Stand Transfers) walker, front-wheeled  -EC        Comment, (Sit-Stand Transfer) occasionally uses walker for support, vcs to push up from surface  -EC           Stand-Sit Transfer    Stand-Sit [place] (Transfers) standby assist  -EC        Assistive Device (Stand-Sit Transfers) walker, front-wheeled  -EC           Toilet Transfer    Type (Toilet Transfer) sit-stand;stand-sit  -EC        [place] Level (Toilet Transfer) standby assist;verbal cues  -EC        Assistive Device (Toilet Transfer) commode;grab bars/safety frame;walker, front-wheeled  -EC           Balance    Balance Interventions occupation based/functional task;dynamic reaching  -EC        Comment, Balance amb in room to perform dynamic reaching activity, no LOB  -EC           Plan of Care Review    Plan of Care  Reviewed With patient  -EC        Progress improving  -EC        Outcome Evaluation OT tx complete. Pt sitting up in chair w/ no c/o. Pt motivated to participate in therapy. Pt completed all sit<>stand & fxnal mobility SBA/CGA. Pt amb in room using rwx to complete dynamic reaching activity w/ items suctioned to wall. Pt had no LOB & demos good dynamic standing balance. Pt voided on commode and was able to complete perineal hygiene ind. Pt completed grooming tasks standing sinkside. OT answered pt & spouse questions regarding ADL performance at home & edu'd pt on techniques to prevent falls during home mngmnt tasks. Continue OT POC. Pt is a good candidate for IRF.  -EC           Positioning and Restraints    Pre-Treatment Position sitting in chair/recliner  -EC        Post Treatment Position chair  -EC        In Chair call light within reach;encouraged to call for assist;with family/caregiver  -EC                  User Key  (r) = Recorded By, (t) = Taken By, (c) = Cosigned By      Initials Name Effective Dates    Loraine Womack OTR/L 10/13/23 -                      Occupational Therapy Education       Title: PT OT SLP Therapies (Done)       Topic: Occupational Therapy (Done)       Point: ADL training (Done)       Description:   Instruct learner(s) on proper safety adaptation and remediation techniques during self care or transfers.   Instruct in proper use of assistive devices.                  Learning Progress Summary             Patient Acceptance, E,D, VU,NR by  at 10/18/2023 1141    Acceptance, E, VU by MB at 10/17/2023 1007                         Point: Home exercise program (Done)       Description:   Instruct learner(s) on appropriate technique for monitoring, assisting and/or progressing therapeutic exercises/activities.                  Learning Progress Summary             Patient Acceptance, E, VU by MB at 10/17/2023 1007                         Point: Precautions (Done)       Description:    Instruct learner(s) on prescribed precautions during self-care and functional transfers.                  Learning Progress Summary             Patient Acceptance, E,D, VU,NR by  at 10/18/2023 1141    Acceptance, E, VU by MB at 10/17/2023 1007                         Point: Body mechanics (Done)       Description:   Instruct learner(s) on proper positioning and spine alignment during self-care, functional mobility activities and/or exercises.                  Learning Progress Summary             Patient Acceptance, E,D, VU,NR by  at 10/18/2023 1141    Acceptance, E, VU by MB at 10/17/2023 1007                                         User Key       Initials Effective Dates Name Provider Type Discipline     07/11/23 -  Anastasia Chino, OTR/L Occupational Therapist OT    MB 08/04/23 -  Misha Smith OT Student OT Student OT                      OT Recommendation and Plan     Plan of Care Review  Plan of Care Reviewed With: patient  Progress: improving  Outcome Evaluation: OT tx complete. Pt sitting up in chair w/ no c/o. Pt motivated to participate in therapy. Pt completed all sit<>stand & fxnal mobility SBA/CGA. Pt amb in room using rwx to complete dynamic reaching activity w/ items suctioned to wall. Pt had no LOB & demos good dynamic standing balance. Pt voided on commode and was able to complete perineal hygiene ind. Pt completed grooming tasks standing sinkside. OT answered pt & spouse questions regarding ADL performance at home & edu'd pt on techniques to prevent falls during home mngmnt tasks. Continue OT POC. Pt is a good candidate for IRF.  Plan of Care Reviewed With: patient  Outcome Evaluation: OT tx complete. Pt sitting up in chair w/ no c/o. Pt motivated to participate in therapy. Pt completed all sit<>stand & fxnal mobility SBA/CGA. Pt amb in room using rwx to complete dynamic reaching activity w/ items suctioned to wall. Pt had no LOB & demos good dynamic standing balance. Pt voided on  commode and was able to complete perineal hygiene ind. Pt completed grooming tasks standing sinkside. OT answered pt & spouse questions regarding ADL performance at home & edu'd pt on techniques to prevent falls during home mngmnt tasks. Continue OT POC. Pt is a good candidate for IRF.     Outcome Measures       Row Name 10/19/23 1300 10/19/23 1000 10/18/23 0900       How much help from another person do you currently need...    Turning from your back to your side while in flat bed without using bedrails? -- 4  -KJ 4  -KJ    Moving from lying on back to sitting on the side of a flat bed without bedrails? -- 4  -KJ 4  -KJ    Moving to and from a bed to a chair (including a wheelchair)? -- 3  -KJ 3  -KJ    Standing up from a chair using your arms (e.g., wheelchair, bedside chair)? -- 3  -KJ 3  -KJ    Climbing 3-5 steps with a railing? -- 2  -KJ 3  -KJ    To walk in hospital room? -- 3  -KJ 3  -KJ    AM-PAC 6 Clicks Score (PT) -- 19  -KJ 20  -KJ    Highest level of mobility -- 6 --> Walked 10 steps or more  -KJ 6 --> Walked 10 steps or more  -KJ       How much help from another is currently needed...    Putting on and taking off regular lower body clothing? 3  -EC -- --    Bathing (including washing, rinsing, and drying) 3  -EC -- --    Toileting (which includes using toilet bed pan or urinal) 3  -EC -- --    Putting on and taking off regular upper body clothing 4  -EC -- --    Taking care of personal grooming (such as brushing teeth) 4  -EC -- --    Eating meals 4  -EC -- --    AM-PAC 6 Clicks Score (OT) 21  -EC -- --       Functional Assessment    Outcome Measure Options AM-PAC 6 Clicks Daily Activity (OT)  -EC AM-PAC 6 Clicks Basic Mobility (PT)  -KJ AM-PAC 6 Clicks Basic Mobility (PT)  -KJ              User Key  (r) = Recorded By, (t) = Taken By, (c) = Cosigned By      Initials Name Provider Type    Bri Hoffman, PTA Physical Therapist Assistant    Loraine Womack, OTR/L Occupational Therapist                     Time Calculation:    Time Calculation- OT       Row Name 10/19/23 1201             Time Calculation- OT    OT Start Time 1120  -EC      OT Stop Time 1200  -EC      OT Time Calculation (min) 40 min  -EC      Total Timed Code Minutes- OT 40 minute(s)  -EC      OT Received On 10/19/23  -EC                User Key  (r) = Recorded By, (t) = Taken By, (c) = Cosigned By      Initials Name Provider Type    EC Loraine Coreas, OTR/L Occupational Therapist                  Therapy Charges for Today       Code Description Service Date Service Provider Modifiers Qty    08096405472 HC OT SELF CARE/MGMT/TRAIN EA 15 MIN 10/19/2023 Loraine Coreas, OTR/L GO 2    60213861024 HC OT THERAPEUTIC ACT EA 15 MIN 10/19/2023 Loraine Coreas, OTR/L GO 1                 Loraine Coreas OTR/L  10/19/2023

## 2023-10-19 NOTE — PLAN OF CARE
Goal Outcome Evaluation:  Plan of Care Reviewed With: patient        Progress: improving  Outcome Evaluation: OT tx complete. Pt sitting up in chair w/ no c/o. Pt motivated to participate in therapy. Pt completed all sit<>stand & fxnal mobility SBA/CGA. Pt amb in room using rwx to complete dynamic reaching activity w/ items suctioned to wall. Pt had no LOB & demos good dynamic standing balance. Pt voided on commode and was able to complete perineal hygiene ind. Pt completed grooming tasks standing sinkside. OT answered pt & spouse questions regarding ADL performance at home & edu'd pt on techniques to prevent falls during home mngmnt tasks. Continue OT POC. Pt is a good candidate for IRF.

## 2023-10-19 NOTE — DISCHARGE PLACEMENT REQUEST
"Charlene Bey (67 y.o. Female)       Date of Birth   1955    Social Security Number       Address   [address] [place] [address]    Home Phone   [phone]    MRN   [id number]       Jainism   Christian    Marital Status                               Admission Date   10/16/23    Admission Type   Urgent    Admitting Provider   Jose Avery MD    Attending Provider   Jose Avery MD    Department, Room/Bed   [facility] 3A, 326/1       Discharge Date       Discharge Disposition       Discharge Destination                                 Attending Provider: Jose Avery MD    Allergies: Compazine [Prochlorperazine], Erythromycin, Ultram [Tramadol], Adhesive Tape    Isolation: None   Infection: None   Code Status: CPR    Ht: 170.2 cm (67\")   Wt: 87.5 kg (193 lb)    Admission Cmt: None   Principal Problem: Paresthesia and pain of right extremity [M79.609,R20.2]                   Active Insurance as of 10/16/2023       Primary Coverage       Payor Plan Insurance Group Employer/Plan Group    [employer] BLUE CROSS AN[employer]M BLUE CROSS BLUE SHIELD PPO [id number]       Payor Plan Address Payor Plan Phone Number Payor Plan Fax Number Effective Dates    [address] [phone]  1/2/2021 - None Entered    [place] [address]         Subscriber Name Subscriber Birth Date Member ID       THADCHRISTI BARRAGAN SR EMMA 3/14/1957 [id number]                     Emergency Contacts        (Rel.) Home Phone Work Phone Mobile Phone    CHRISTI BEY (Spouse) [phone]              Vital Signs (last day)       Date/Time Temp Temp src Pulse Resp BP Patient Position SpO2    10/19/23 0738 97.8 (36.6) Oral 57 18 122/53 Lying 97    10/19/23 0250 97.8 (36.6) Oral 51 18 127/60 Lying 96    10/18/23 2344 98.1 (36.7) Oral 68 18 126/46 Lying 98    10/18/23 2001 98.6 (37) Oral 61 16 117/66 Lying 97    10/18/23 1522 98.4 (36.9) Oral 57 20 125/51 Lying 98    10/18/23 " 1100 97.4 (36.3) Oral 66 18 134/52 Sitting 98    10/18/23 0723 97.6 (36.4) Oral 50 16 127/52 Lying 97    10/18/23 0347 98 (36.7) Oral 59 16 131/56 Lying 94    10/18/23 0002 98 (36.7) Oral 62 18 134/69 Sitting 97          Current Facility-Administered Medications   Medication Dose Route Frequency Provider Last Rate Last Admin    acetaminophen (TYLENOL) tablet 650 mg  650 mg Oral Q4H PRN Jose Avery MD   650 mg at 10/17/23 2219    Or    acetaminophen (TYLENOL) 160 MG/5ML oral solution 650 mg  650 mg Oral Q4H PRN Jose Avrey MD        Or    acetaminophen (TYLENOL) suppository 650 mg  650 mg Rectal Q4H PRN Jose Avery MD        atorvastatin (LIPITOR) tablet 80 mg  80 mg Oral Nightly Sherlyn Fernandes, GREGORIO   80 mg at 10/18/23 2050    sennosides-docusate (PERICOLACE) 8.6-50 MG per tablet 2 tablet  2 tablet Oral BID Jose Avery MD   2 tablet at 10/19/23 0835    And    polyethylene glycol (MIRALAX) packet 17 g  17 g Oral Daily PRN Jose Avery MD   17 g at 10/18/23 1649    And    bisacodyl (DULCOLAX) EC tablet 5 mg  5 mg Oral Daily PRN Jose Avery MD        And    bisacodyl (DULCOLAX) suppository 10 mg  10 mg Rectal Daily PRN Jose Avery MD        calcium carbonate (TUMS) chewable tablet 500 mg (200 mg elemental)  1 tablet Oral BID PRN Jose Avery MD        Calcium Replacement - Follow Nurse / BPA Driven Protocol   Does not apply PRN Jose Avery MD        dextrose (D50W) (25 g/50 mL) IV injection 25 g  25 g Intravenous Q15 Min PRN Jose Avery MD        dextrose (GLUTOSE) oral gel 15 g  15 g Oral Q15 Min PRN Jose Avery MD        gabapentin (NEURONTIN) capsule 300 mg  300 mg Oral TID Cirilo Lynn MD   300 mg at 10/19/23 0813    glucagon (GLUCAGEN) injection 1 mg  1 mg Intramuscular Q15 Min PRN Jose Avery MD        HYDROcodone-acetaminophen (NORCO) 7.5-325 MG per tablet 1 tablet  1 tablet Oral Q6H PRN Jose Avery MD    1 tablet at 10/17/23 1444    insulin detemir (LEVEMIR) injection 10 Units  10 Units Subcutaneous Nightly Jose Avery MD   10 Units at 10/18/23 2050    Insulin Lispro (humaLOG) injection 3-14 Units  3-14 Units Subcutaneous 4x Daily AC & at Bedtime Jose Avery MD   3 Units at 10/19/23 0812    lactobacillus acidophilus (RISAQUAD) capsule 1 capsule  1 capsule Oral Daily Jose Avery MD   1 capsule at 10/19/23 0836    levothyroxine (SYNTHROID, LEVOTHROID) tablet 175 mcg  175 mcg Oral Q AM Jose Avery MD   175 mcg at 10/19/23 0540    magnesium (as) gluconate (MAGONATE) tablet 27 mg  27 mg Oral BID Jose Avery MD   27 mg at 10/19/23 0836    Magnesium Standard Dose Replacement - Follow Nurse / BPA Driven Protocol   Does not apply PRN Jose Avery MD        melatonin tablet 5 mg  5 mg Oral Nightly PRN Jose Avery MD   5 mg at 10/18/23 2051    methocarbamol (ROBAXIN) tablet 500 mg  500 mg Oral Daily PRN Jose Avery MD   500 mg at 10/18/23 2051    montelukast (SINGULAIR) tablet 10 mg  10 mg Oral Nightly Jose Avery MD   10 mg at 10/18/23 2050    nitroglycerin (NITROSTAT) SL tablet 0.4 mg  0.4 mg Sublingual Q5 Min PRN Jose Avery MD        ondansetron (ZOFRAN) tablet 4 mg  4 mg Oral Q6H PRN Jose Avery MD        Or    ondansetron (ZOFRAN) injection 4 mg  4 mg Intravenous Q6H PRN Jose Avery MD        pancrelipase (Lip-Prot-Amyl) (CREON) capsule 12,000 units of lipase  12,000 units of lipase Oral TID With Meals Jose Avery MD   12,000 units of lipase at 10/19/23 0756    Phosphorus Replacement - Follow Nurse / BPA Driven Protocol   Does not apply PRN Jose Avery MD        Potassium Replacement - Follow Nurse / BPA Driven Protocol   Does not apply PRN Jose Avery MD        sertraline (ZOLOFT) tablet 100 mg  100 mg Oral Daily Jose Avery MD   100 mg at 10/19/23 0836    sodium chloride 0.9 % flush 10 mL  10  mL Intravenous Q12H Jose Avery MD   10 mL at 10/19/23 0837    sodium chloride 0.9 % flush 10 mL  10 mL Intravenous PRN Jose Avery MD        sodium chloride 0.9 % infusion 40 mL  40 mL Intravenous PRN Jose Avery MD        vitamin B-12 (CYANOCOBALAMIN) tablet 1,000 mcg  1,000 mcg Oral Daily Jose Avery MD   1,000 mcg at 10/19/23 0836    vitamin D3 capsule 5,000 Units  5,000 Units Oral Daily Jose Avery MD   5,000 Units at 10/19/23 0835     Lab Results (last 48 hours)       Procedure Component Value Units Date/Time    POC Glucose Once [[id number]]  (Abnormal) Collected: 10/19/23 0751    Specimen: Blood Updated: 10/19/23 0803     Glucose 170 mg/dL      Comment: : [id number] Lo AutumnMeter ID: [id number]       Comprehensive Metabolic Panel [[id number]]  (Abnormal) Collected: 10/19/23 0510    Specimen: Blood Updated: 10/19/23 0615     Glucose 199 mg/dL      BUN 16 mg/dL      Creatinine 0.61 mg/dL      Sodium 140 mmol/L      Potassium 4.1 mmol/L      Chloride 104 mmol/L      CO2 26.0 mmol/L      Calcium 9.5 mg/dL      Total Protein 6.9 g/dL      Albumin 4.3 g/dL      ALT (SGPT) 16 U/L      AST (SGOT) 15 U/L      Alkaline Phosphatase 72 U/L      Total Bilirubin 0.2 mg/dL      Globulin 2.6 gm/dL      A/G Ratio 1.7 g/dL      BUN/Creatinine Ratio 26.2     Anion Gap 10.0 mmol/L      eGFR 98.1 mL/min/1.73     Narrative:      GFR Normal >60  Chronic Kidney Disease <60  Kidney Failure <15      CBC & Differential [[id number]]  (Normal) Collected: 10/19/23 0510    Specimen: Blood Updated: 10/19/23 0555    Narrative:      The following orders were created for panel order CBC & Differential.  Procedure                               Abnormality         Status                     ---------                               -----------         ------                     CBC Auto Differential[[id number]]        Normal              Final result                 Please view results for these tests  on the individual orders.    CBC Auto Differential [[id number]]  (Normal) Collected: 10/19/23 0510    Specimen: Blood Updated: 10/19/23 0555     WBC 7.78 10*3/mm3      RBC 4.83 10*6/mm3      Hemoglobin 13.0 g/dL      Hematocrit 40.3 %      MCV 83.4 fL      MCH 26.9 pg      MCHC 32.3 g/dL      RDW 14.5 %      RDW-SD 44.2 fl      MPV 9.7 fL      Platelets 174 10*3/mm3      Neutrophil % 55.2 %      Lymphocyte % 34.4 %      Monocyte % 7.7 %      Eosinophil % 1.8 %      Basophil % 0.5 %      Immature Grans % 0.4 %      Neutrophils, Absolute 4.29 10*3/mm3      Lymphocytes, Absolute 2.68 10*3/mm3      Monocytes, Absolute 0.60 10*3/mm3      Eosinophils, Absolute 0.14 10*3/mm3      Basophils, Absolute 0.04 10*3/mm3      Immature Grans, Absolute 0.03 10*3/mm3      nRBC 0.0 /100 WBC     POC Glucose Once [[id number]]  (Abnormal) Collected: 10/18/23 2006    Specimen: Blood Updated: 10/18/23 2017     Glucose 202 mg/dL      Comment: : [id number] HALO Medical TechnologiesMeter ID: [id number]       POC Glucose Once [[id number]]  (Abnormal) Collected: 10/18/23 1624    Specimen: Blood Updated: 10/18/23 1635     Glucose 143 mg/dL      Comment: : [id number] HAL[employer] Medical TechnologiesMeter ID: [id number]       POC Glucose Once [[id number]]  (Abnormal) Collected: 10/18/23 1135    Specimen: Blood Updated: 10/18/23 1146     Glucose 166 mg/dL      Comment: : [id number] HAL[employer] Medical TechnologiesMeter ID: [id number]       POC Glucose Once [[id number]]  (Abnormal) Collected: 10/18/23 0750    Specimen: Blood Updated: 10/18/23 0812     Glucose 177 mg/dL      Comment: : [id number] Everardo Keyword RockstarMeter ID: [id number]       Comprehensive Metabolic Panel [[id number]]  (Abnormal) Collected: 10/18/23 0350    Specimen: Blood Updated: 10/18/23 0436     Glucose 189 mg/dL      BUN 15 mg/dL      Creatinine 0.65 mg/dL      Sodium 139 mmol/L      Potassium 3.9 mmol/L      Chloride 103 mmol/L      CO2 27.0 mmol/L      Calcium 9.8 mg/dL      Total Protein 7.3 g/dL      Albumin 4.4  g/dL      ALT (SGPT) 15 U/L      AST (SGOT) 15 U/L      Alkaline Phosphatase 71 U/L      Total Bilirubin 0.2 mg/dL      Globulin 2.9 gm/dL      A/G Ratio 1.5 g/dL      BUN/Creatinine Ratio 23.1     Anion Gap 9.0 mmol/L      eGFR 96.6 mL/min/1.73     Narrative:      GFR Normal >60  Chronic Kidney Disease <60  Kidney Failure <15      CBC & Differential [[id number]]  (Normal) Collected: 10/18/23 0350    Specimen: Blood Updated: 10/18/23 0415    Narrative:      The following orders were created for panel order CBC & Differential.  Procedure                               Abnormality         Status                     ---------                               -----------         ------                     CBC Auto Differential[[id number]]        Normal              Final result                 Please view results for these tests on the individual orders.    CBC Auto Differential [[id number]]  (Normal) Collected: 10/18/23 0350    Specimen: Blood Updated: 10/18/23 0415     WBC 8.07 10*3/mm3      RBC 4.83 10*6/mm3      Hemoglobin 12.9 g/dL      Hematocrit 39.9 %      MCV 82.6 fL      MCH 26.7 pg      MCHC 32.3 g/dL      RDW 14.8 %      RDW-SD 44.8 fl      MPV 9.8 fL      Platelets 187 10*3/mm3      Neutrophil % 54.3 %      Lymphocyte % 36.8 %      Monocyte % 6.9 %      Eosinophil % 1.4 %      Basophil % 0.4 %      Immature Grans % 0.2 %      Neutrophils, Absolute 4.38 10*3/mm3      Lymphocytes, Absolute 2.97 10*3/mm3      Monocytes, Absolute 0.56 10*3/mm3      Eosinophils, Absolute 0.11 10*3/mm3      Basophils, Absolute 0.03 10*3/mm3      Immature Grans, Absolute 0.02 10*3/mm3      nRBC 0.0 /100 WBC     POC Glucose Once [[id number]]  (Abnormal) Collected: 10/17/23 2153    Specimen: Blood Updated: 10/17/23 2204     Glucose 144 mg/dL      Comment: : [id number] Tereso BaconMeter ID: [id number]       POC Glucose Once [[id number]]  (Normal) Collected: 10/17/23 1701    Specimen: Blood Updated: 10/17/23 1712     Glucose 125 mg/dL       Comment: : [id number]  KierstonMeter ID: [id number]       POC Glucose Once [[id number]]  (Abnormal) Collected: 10/17/23 1138    Specimen: Blood Updated: 10/17/23 1149     Glucose 206 mg/dL      Comment: : [id number] Burgess DonalderstonMeter ID: [id number]       NMO IgG Autoantibodies [[id number]] Collected: 10/17/23 0948    Specimen: Blood Updated: 10/17/23 0952             Physician Progress Notes (last 24 hours)        Yadira Fink APRN at 10/18/23 1039       Attestation signed by Jose Avery MD at 10/18/23 1614    This visit was performed by both a physician and an APC. I personally evaluated and examined the patient. I performed all aspects of the MDM as documented.      Electronically signed by Jose Avery MD, 10/18/2023, 16:14 CDT.                       [facility] Medicine Services  INPATIENT PROGRESS NOTE    Patient Name: Charlene Bey  Date of Admission: 10/16/2023  Today's Date: 10/18/23  Length of Stay: 0  Primary Care Physician: Alex Saravia APRN    Subjective   Chief Complaint: right leg numbness   HPI   Ms. Bey is a 67-year-old female who presented to [facility] on 10/16 for right lower extremity paresthesia.  Patient was COVID-positive on 10/11/2023.  She started Paxlovid and had improvement in symptoms.  On 10/16, she awoke in her usual state of health when she noted sudden numbness to right foot.  Numbness progressed to the entire right side to upper abdomen. She also noted right lower extremity weakness and had difficulty raising right leg. She also notes pain that begins in abdomen and radiates to her back similar to prior pancreatitis attacks.  She originally presented to [facility] emergency department. CT head was negative, CT abd/pelvis showed large hiatal hernia, moderate amount of stool, fat containing umbilical and supra umbilical hernia, left and right ingquinal hernias. amylase/lipase WNL. TSH 9.92. Dr. GARCIA  Brooklynn discussed case with Dr. ELLE Lynn and patient accepted for transfer.     Sitting on the edge of the bed eating lunch with spouse at bedside. She feels her right lower extremity strength and sensation slowly continue to improve each day.   Right brain yesterday showed no acute findings.  Neurology suspects post-COVID plexitis.  She has worked with PT and OT.  Therapy recommends discharge to acute rehab.  [facility] once [facility] offered and accepted, pre-CERT will be started.    Review of Systems   All pertinent negatives and positives are as above. All other systems have been reviewed and are negative unless otherwise stated.     Objective    Temp:  [97.6 °F (36.4 °C)-98.7 °F (37.1 °C)] 97.6 °F (36.4 °C)  Heart Rate:  [50-62] 50  Resp:  [16-18] 16  BP: (119-134)/(52-72) 127/52  Physical Exam  Vitals reviewed.   Constitutional:       General: She is not in acute distress.     Appearance: She is obese. She is not toxic-appearing.      Comments: Sitting on the edge of the bed eating lunch with spouse at bedside.  No acute distress.  On room air.   HENT:      Head: Normocephalic and atraumatic.      Mouth/Throat:      Mouth: Mucous membranes are moist.      Pharynx: Oropharynx is clear.   Eyes:      Extraocular Movements: Extraocular movements intact.      Conjunctiva/sclera: Conjunctivae normal.      Pupils: Pupils are equal, round, and reactive to light.   Cardiovascular:      Rate and Rhythm: Normal rate and regular rhythm.      Pulses: Normal pulses.   Pulmonary:      Effort: Pulmonary effort is normal. No respiratory distress.      Breath sounds: Normal breath sounds.   Abdominal:      General: Bowel sounds are normal. There is no distension.      Palpations: Abdomen is soft.      Tenderness: There is no abdominal tenderness.   Musculoskeletal:         General: No swelling or tenderness. Normal range of motion.      Cervical back: Normal range of motion and neck supple. No  "muscular tenderness.   Skin:     General: Skin is warm and dry.      Findings: No erythema or rash.   Neurological:      General: No focal deficit present.      Mental Status: She is alert and oriented to person, place, and time.      Sensory: Sensory deficit (She has numbness from the right side of her stomach down to her right leg.) present.      Motor: Weakness (RLE) present.      Gait: Gait abnormal.   Psychiatric:         Mood and Affect: Mood normal.         Behavior: Behavior normal.         Results Review:  I have reviewed the labs, radiology results, and diagnostic studies.    Laboratory Data:   Results from last 7 days   Lab Units 10/18/23  0350 10/17/23  0445 10/16/23  1551   WBC 10*3/mm3 8.07 8.26 8.11   HEMOGLOBIN g/dL 12.9 12.1 12.5   HEMATOCRIT % 39.9 38.1 38.6   PLATELETS 10*3/mm3 187 161 186        Results from last 7 days   Lab Units 10/18/23  0350 10/17/23  0445 10/16/23  1551   SODIUM mmol/L 139 140 139   POTASSIUM mmol/L 3.9 4.0 3.9   CHLORIDE mmol/L 103 105 104   CO2 mmol/L 27.0 28.0 27.0   BUN mg/dL 15 14 16   CREATININE mg/dL 0.65 0.76 0.75   CALCIUM mg/dL 9.8 9.1 9.2   BILIRUBIN mg/dL 0.2 0.2 0.2   ALK PHOS U/L 71 58 63   ALT (SGPT) U/L 15 14 16   AST (SGOT) U/L 15 13 15   GLUCOSE mg/dL 189* 153* 195*       Culture Data:   No results found for: \"ACANTHNAEG\", \"AFBCX\", \"BPERTUSSISCX\", \"BLOODCX\"  No results found for: \"BCIDPCR\", \"CXREFLEX\", \"CSFCX\", \"CULTURETIS\"  No results found for: \"CULTURES\", \"HSVCX\", \"URCX\"  No results found for: \"EYECULTURE\", \"GCCX\", \"HSVCULTURE\", \"LABHSV\"  No results found for: \"LEGIONELLA\", \"MRSACX\", \"MUMPSCX\", \"MYCOPLASCX\"  No results found for: \"NOCARDIACX\", \"STOOLCX\"  No results found for: \"THROATCX\", \"UNSTIMCULT\", \"URINECX\", \"CULTURE\", \"VZVCULTUR\"  No results found for: \"VIRALCULTU\", \"WOUNDCX\"    Radiology Data:   Imaging Results (Last 24 Hours)       Procedure Component Value Units Date/Time    MRI Brain With & Without Contrast [[id number]] Collected: 10/17/23 1847 "     Updated: 10/17/23 1856    Narrative:      EXAM/TECHNIQUE: MRI brain without and with IV contrast     INDICATION: RLE weakness and numbness; R26.9-Unspecified abnormalities  of gait and mobility     COMPARISON: None     FINDINGS:     No diffusion restriction to suggest acute infarction. No increased  susceptibility to suggest acute or chronic intracranial hemorrhage. The  major physiologic flow voids are maintained.     No abnormality of brain parenchyma signal intensity. No midline shift or  mass effect. Lateral ventricles are nondilated. Basilar cisterns are  patent. The sella turcica and its contents appear unremarkable.  Following contrast administration, no abnormal foci of enhancement are  identified.     No acute orbital finding. Small left mastoid effusion. Paranasal sinuses  are clear.       Impression:         No acute intracranial findings.     This report was signed and finalized on 10/17/2023 6:52 PM CDT by Dr. Luis Chawla MD.               I have reviewed the patient's current medications.     Assessment/Plan   Assessment  Active Hospital Problems    Diagnosis     **Paresthesia and pain of right extremity     Type 2 diabetes mellitus with hyperglycemia     Hypothyroidism (acquired)     Chronic pancreatitis     Nonintractable migraine     Dizziness, nonspecific        Treatment Plan  Neurology, Dr. Lynn consulted. MRI TS showed no acute finding. MRI LS showed moderate multlilevel degenerative changes and 8 mm anterolisthesis osf L5 on S1.  MRI brain showed no acute findings.  Neurology suspects post-COVID plexitis.  She declines lumbar puncture.  She has had overall drastic improvement.  Dr. Lynn has started gabapentin 300 mg 3 times daily.    , continue high intensity statin.    Type 2 diabetes with A1c 7.8.  Continue Accu-Cheks with sliding scale insulin, Levemir.    Continue PT/OT.  Therapy recommends inpatient rehab.    SCDs for DVT prophylaxis.    Medical Decision  Making  Number and Complexity of problems: 1 acute problem with high complexity.  Multiple chronic medical conditions  Differential Diagnosis: as above     Conditions and Status        Condition is improving.     [facility] Data  External documents reviewed: Epic records, facesheet and Care Everywhere  Cardiac tracing (EKG, telemetry) interpretation: EKG from outside facility shows sinus bradycardia.  Radiology interpretation: CT head, CT abdomen pelvis from outside facility reviewed  Labs reviewed: Labs from outside facility reviewed  Any tests that were considered but not ordered: N/A     Decision rules/scores evaluated (example [id number], Wells, etc): N/A     Discussed with: Patient and her spouse.     Care Planning  Shared decision making: Patient is agreeable to ongoing work-up and treatment  Code status and discussions: Full code with full interventions    Disposition  Social Determinants of Health that impact treatment or disposition: Referral sent to both [facility], once bed offered and accepted, pre-CERT will be started.  I expect the patient to be discharged to inpatient rehab in 1-2 days.     Electronically signed by GREGORIO Samaniego, 10/18/23, 10:39 CDT.      Electronically signed by Jose Avery MD at 10/18/23 1614       Cirilo Lynn MD at 10/18/23 1022            Neurology Progress Note      Chief Complaint:  f/u RLE weakness and numbness  Length of Stay:  0   Subjective     Subjective:  She is doing very well this morning.  She is sitting up at the bedside in a chair.  Her  is in the room.  She tells me that her right lower extremity has improved from a strength standpoint.  Unfortunately she is having some dysesthesia and  paresthesias.   This has kept her up for the last 2 nights and is extremely uncomfortable.    Medications:  Current Facility-Administered Medications   Medication Dose Route Frequency Provider Last Rate Last Admin    acetaminophen (TYLENOL) tablet 650  mg  650 mg Oral Q4H PRN Jose Avery MD   650 mg at 10/17/23 2219    Or    acetaminophen (TYLENOL) 160 MG/5ML oral solution 650 mg  650 mg Oral Q4H PRN Jose Avery MD        Or    acetaminophen (TYLENOL) suppository 650 mg  650 mg Rectal Q4H PRN Jose Avery MD        atorvastatin (LIPITOR) tablet 80 mg  80 mg Oral Nightly Sherlyn Fernandes, GREGORIO   80 mg at 10/17/23 2219    sennosides-docusate (PERICOLACE) 8.6-50 MG per tablet 2 tablet  2 tablet Oral BID Jose Avery MD   2 tablet at 10/18/23 0827    And    polyethylene glycol (MIRALAX) packet 17 g  17 g Oral Daily PRN Jose Avery MD        And    bisacodyl (DULCOLAX) EC tablet 5 mg  5 mg Oral Daily PRN Jose Avery MD        And    bisacodyl (DULCOLAX) suppository 10 mg  10 mg Rectal Daily PRN Jose Avery MD        calcium carbonate (TUMS) chewable tablet 500 mg (200 mg elemental)  1 tablet Oral BID PRN Jose Avery MD        Calcium Replacement - Follow Nurse / BPA Driven Protocol   Does not apply PRN Jose Avery MD        dextrose (D50W) (25 g/50 mL) IV injection 25 g  25 g Intravenous Q15 Min PRN Jose Avery MD        dextrose (GLUTOSE) oral gel 15 g  15 g Oral Q15 Min PRN Jose Avery MD        glucagon (GLUCAGEN) injection 1 mg  1 mg Intramuscular Q15 Min PRN Jose Avery MD        HYDROcodone-acetaminophen (NORCO) 7.5-325 MG per tablet 1 tablet  1 tablet Oral Q6H PRN Jose Avery MD   1 tablet at 10/17/23 1444    insulin detemir (LEVEMIR) injection 10 Units  10 Units Subcutaneous Nightly Jose Avery MD   10 Units at 10/17/23 2220    Insulin Lispro (humaLOG) injection 3-14 Units  3-14 Units Subcutaneous 4x Daily AC & at Bedtime Jose Avery MD   3 Units at 10/18/23 0826    lactobacillus acidophilus (RISAQUAD) capsule 1 capsule  1 capsule Oral Daily Jose Avery MD   1 capsule at 10/18/23 0827    levothyroxine (SYNTHROID, LEVOTHROID) tablet 175  mcg  175 mcg Oral Q AM Jose Avery MD   175 mcg at 10/18/23 0548    magnesium (as) gluconate (MAGONATE) tablet 27 mg  27 mg Oral BID Jose Avery MD   27 mg at 10/18/23 0827    Magnesium Standard Dose Replacement - Follow Nurse / BPA Driven Protocol   Does not apply PRN Jose Avery MD        melatonin tablet 5 mg  5 mg Oral Nightly PRN Jose Avery MD   5 mg at 10/17/23 2219    methocarbamol (ROBAXIN) tablet 500 mg  500 mg Oral Daily PRN Jose Avery MD   500 mg at 10/17/23 2219    montelukast (SINGULAIR) tablet 10 mg  10 mg Oral Nightly Jose Avery MD   10 mg at 10/17/23 2219    nitroglycerin (NITROSTAT) SL tablet 0.4 mg  0.4 mg Sublingual Q5 Min PRN Jose Avery MD        ondansetron (ZOFRAN) tablet 4 mg  4 mg Oral Q6H PRN Jose Avery MD        Or    ondansetron (ZOFRAN) injection 4 mg  4 mg Intravenous Q6H PRN Jose Avery MD        pancrelipase (Lip-Prot-Amyl) (CREON) capsule 12,000 units of lipase  12,000 units of lipase Oral TID With Meals Jose Avery MD   12,000 units of lipase at 10/18/23 0827    Phosphorus Replacement - Follow Nurse / BPA Driven Protocol   Does not apply PRN Jose Avery MD        Potassium Replacement - Follow Nurse / BPA Driven Protocol   Does not apply PRN Jose Avery MD        sertraline (ZOLOFT) tablet 100 mg  100 mg Oral Daily Jose Avery MD   100 mg at 10/18/23 0827    sodium chloride 0.9 % flush 10 mL  10 mL Intravenous Q12H Jose Avery MD   10 mL at 10/18/23 0829    sodium chloride 0.9 % flush 10 mL  10 mL Intravenous PRN Jose Avery MD        sodium chloride 0.9 % infusion 40 mL  40 mL Intravenous PRN Jose Avery MD        vitamin B-12 (CYANOCOBALAMIN) tablet 1,000 mcg  1,000 mcg Oral Daily Jose Avery MD   1,000 mcg at 10/18/23 0827    vitamin D3 capsule 5,000 Units  5,000 Units Oral Daily Jose Avery MD   5,000 Units at 10/18/23 0827              Objective      Vital Signs  Temp:  [97.6 °F (36.4 °C)-98.7 °F (37.1 °C)] 97.6 °F (36.4 °C)  Heart Rate:  [50-62] 50  Resp:  [16-18] 16  BP: (119-134)/(52-72) 127/52    Telemetry SB - S 49-73    Physical Exam:    General Exam:  Head:  Normocephalic, atraumatic  HEENT:  Neck supple  Fundoscopic Exam:  No signs of disc edema  CVS:  Regular rate and rhythm.  No murmurs  Carotid Examination:  No bruits  Lungs:  Clear to auscultation  Abdomen:  Nontender, nondistended  Extremities:  No signs of peripheral edema  Skin:  No rashes    Neurologic Exam:    Mental Status:    -Alert, Oriented X 3  -No word-finding difficulties  -No aphasia  -No dysarthria  -Follows simple and complex commands    CN II:  Visual fields full.  Pupils equally reactive to light  CN III, IV, VI:  Extraocular Muscles full with no signs of nystagmus  CN V:  Facial sensory is symmetric with no asymmetries.  CN VII:  Facial motor symmetric  CN VIII:  Gross hearing intact bilaterally  CN IX:  Palate elevates symmetrically  CN X:  Palate elevates symmetrically  CN XI:  Shoulder shrug symmetric  CN XII:  Tongue protrudes to midline  Motor: (strength out of 5:  1= minimal movement, 2 = movement in plane of gravity, 3 = movement against gravity, 4 = movement against some resistance, 5 = full strength)    -Right Upper Ext: Proximal: 5 Distal: 5  -Left Upper Ext: Proximal: 5 Distal: 5    -Right Lower Ext: Proximal: 4 Distal: 4  -Left Lower Ext: Proximal: 5 Distal: 5    DTR:  -Right   Bicep: 2+ Tricep: 2+ Brachoradialis: 2+   Patella: 2+ Ankle: 1+ Neg Babinski  -Left   Bicep: 2+ Tricep: 2+ Brachoradialis: 2+   Patella: 2+ Ankle: 2+ Neg Babinski    Sensory:  Increased sensitivity to touch in RLE.      Coordination:  -Finger to nose intact  -Heel to shin intact      Gait  -up with assist.      Results Review:      Labs:    A1c 7.8  THS 4.27  T4 0.96  B12 571  ESR 6  CRP <0.3      Imaging:  MRI Brain With & Without Contrast    Result Date:  10/17/2023   No acute intracranial findings.  This report was signed and finalized on 10/17/2023 6:52 PM CDT by Dr. Luis Chawla MD.      MRI Thoracic Spine With & Without Contrast    Result Date: 10/16/2023  1.  No acute findings. 2.  Mild multilevel degenerative change. 3.  Large hiatal hernia.   This report was signed and finalized on 10/16/2023 9:04 PM CDT by Dr. Luis Chawla MD.      MRI Lumbar Spine With & Without Contrast    Result Date: 10/16/2023   1.  No acute osseous findings. 2.  8 mm anterolisthesis of L5 on S1. 3.  Moderate multilevel degenerative change, as described above. No area of severe central canal stenosis. There is moderate right sided neural foraminal stenosis at L4-L5 and L5-S1 with disc/osteophyte material closely approximating and likely abutting the exiting right L4 and L5 nerve roots.   This report was signed and finalized on 10/16/2023 7:56 PM CDT by Dr. Luis Chawla MD.        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  There are no active signs of inflammation with her inflammatory factors being unremarkable.  There are no active signs of a myelitis on the MRI of her spine.  There is no evidence of an autoimmune disease in her brain.  There are no T2 flair abnormalities consistent with this.  She has no visual issues and therefore neuromyelitis optica is less likely.  I have offered her lumbar puncture to pursue further testing but she does not want to proceed with this at this time.  She is having drastic improvement with supportive care.  She wants to hold off for now.  We can [place] back to a lumbar puncture if she fails to reach her baseline or if she has any worsening of her neurologic symptoms.  I would like  to give her a neuropathic agent to help with her symptomatic paresthesias.  Covid + on 10/11/2023  DM uncontrolled with hyperglycemia. A1C 7.8  Thyroid disorder  HLD.     Plan:  Follow serum NMO  We will start gabapentin 300 mg 3 times daily  Discharge planning will be acute rehabilitation        Medical Decision Making    Number/Complexity of Problems  Moderate  1 undiagnosed new problem with uncertain prognosis -   1 acute illness with systemic symptoms -   High  1 acute or chronic illness that pose a threat to life/body function -   high     MDM Data  Moderate - 1/3 categories  Extensive - 2/3 categories    Category 1: 3 of the following  Review of external notes  Review of results  Ordering of each unique test  Independent historian  Category 2:  Independent interpretation of test (ex: imaging)  Category 3:  Discussion of management with another provider    extensive     Treatment Plan  Moderate - Prescription Drug management  High  Drug therapy requiring intensive monitoring for toxicity  Decision regarding hospitalization or escalation of care  De-escalate care/DNR decisions  high       Cirilo Lynn MD  10/18/23  10:22 CDT    Electronically signed by Cirilo Lynn MD at 10/18/23 1026       Consult Notes (last 24 hours)  Notes from 10/18/23 0923 through 10/19/23 0923   No notes of this type exist for this encounter.          Physical Therapy Notes (last 24 hours)        Bri Phillips PTA at 10/18/23 0995  Version 1 of 1         Goal Outcome Evaluation:  Plan of Care Reviewed With: patient        Progress: improving  Outcome Evaluation: PT tx completed. Pt reports she is moving RLE better and feels stronger. Although, her movement is improved she still presents with decreased motor control/coordination of RLE during gait. She is unable to ambulate without the use of an assistive device safely. High risk for falls. Recommend rehab or OP therapy.           Electronically signed by Alan  Bri VIDAL, PTA at 10/18/23 0958       Bri Phillips, PTA at 10/18/23 0958  Version 1 of 1         Acute Care - Physical Therapy Treatment Note   [place]     Patient Name: Charlene Bey  : 1955  MRN: [id number]  Today's Date: 10/18/2023      Visit Dx:     ICD-10-CM ICD-9-CM   1. Gait abnormality [R26.9]  R26.9 781.2     Patient Active Problem List   Diagnosis    Paresthesia and pain of right extremity    Type 2 diabetes mellitus with hyperglycemia    Hypothyroidism (acquired)    Chronic pancreatitis    Nonintractable migraine    Dizziness, nonspecific     Past Medical History:   Diagnosis Date    Cystocele with rectocele     Diabetes mellitus     Goiter     Hashimoto's thyroiditis     Headache     Hypothyroidism     Mumps pancreatitis     Pancreatitis     Peptic ulceration     Thyroid nodule      Past Surgical History:   Procedure Laterality Date    BLADDER SUSPENSION      CHOLECYSTECTOMY      COLOSTOMY      CYSTOCELE REPAIR      HERNIA REPAIR      HYSTERECTOMY      OVARIAN CYST REMOVAL      RECTOCELE REPAIR      SMALL INTESTINE SURGERY      TONSILLECTOMY       PT Assessment (last 12 hours)       PT Evaluation and Treatment       Row Name 10/18/23 0870          Physical Therapy Time and Intention    Subjective Information --  states she does feel stronger today  -KJ     Document Type therapy note (daily note)  -KJ     Mode of Treatment physical therapy  -KJ     Patient Effort good  -KJ       Row Name 10/18/23 0839          General Information    Existing Precautions/Restrictions fall  RLE weakness; decreased motor control RLE  -KJ       Row Name 10/18/23 0839          Pain    Pretreatment Pain Rating 0/10 - no pain  -KJ     Posttreatment Pain Rating 0/10 - no pain  -KJ       Row Name 10/18/23 0839          Bed Mobility    Supine-Sit [place] (Bed Mobility) independent  -KJ       Row Name 10/18/23 0839          Sit-Stand Transfer    Sit-Stand [place] (Transfers) contact guard;verbal cues  -KJ      Assistive Device (Sit-Stand Transfers) walker, front-wheeled  -KJ       Row Name 10/18/23 0839          Stand-Sit Transfer    Stand-Sit [place] (Transfers) supervision  -KJ     Assistive Device (Stand-Sit Transfers) walker, front-wheeled  -KJ       Row Name 10/18/23 0839          Gait/Stairs (Locomotion)    [place] Level (Gait) verbal cues;minimum assist (75% patient effort)  -KJ     Distance in Feet (Gait) 10 steps without wx, required min/mod assist for balance. 30' with Rwx CG/Antonio  -KJ     Deviations/Abnormal Patterns (Gait) ataxic;gait speed decreased;steppage;stride length decreased;weight shifting decreased  -KJ     Right Sided Gait Deviations heel strike decreased;weight shift ability decreased  -KJ     Comment, (Gait/Stairs) continued decreased normal motor planning of RLE during gait. Decreased hip/knee flex unless over exaggerated. Unable safely to ambulate without the use of wx  -KJ       Row Name 10/18/23 0839          Balance    Balance Assessment sitting static balance  -KJ     Static Sitting Balance independent  -KJ     Dynamic Sitting Balance independent  -KJ     Position, Sitting Balance sitting edge of bed;unsupported  -KJ     Sit to Stand Dynamic Balance contact guard  -KJ     Static Standing Balance contact guard  -KJ     Dynamic Standing Balance minimal assist;moderate assist  -KJ     Position/Device Used, Standing Balance supported  -KJ       Row Name 10/18/23 0839          Aerobic Exercise    Comment, Aerobic Exercise (Therapeutic Exercise) standing hip flex,abd, perturbations no holding to on for support, tandem stance unable to hold. A & P weight shifts, lateral weight shifts.  -KJ       Row Name 10/18/23 0839          Positioning and Restraints    Pre-Treatment Position in bed  -KJ     Post Treatment Position chair  -KJ     In Bed call light within reach  -KJ               User Key  (r) = Recorded By, (t) = Taken By, (c) = Cosigned By      Initials Name Provider Type    KJ  Bri Phillips, PTA Physical Therapist Assistant                    Physical Therapy Education       Title: PT OT SLP Therapies (Done)       Topic: Physical Therapy (Done)       Point: Mobility training (Done)       Learning Progress Summary             Patient Acceptance, E,TB,D, LILIBETH,CARMEN,NR by  at 10/17/2023 1014    Comment: education re: purpose of PT/importance of activity, for safety/falls prevention, proper use of rwx w/ emphasis on gait sequencing, placement of rwx, hand placement w/ transitions, T/I AA HS and quad sets w/ return demo                         Point: Home exercise program (Done)       Learning Progress Summary             Patient Acceptance, E,TB,D, LILIBETH,DU,NR by GLENNA at 10/17/2023 1014    Comment: education re: purpose of PT/importance of activity, for safety/falls prevention, proper use of rwx w/ emphasis on gait sequencing, placement of rwx, hand placement w/ transitions, T/I AA HS and quad sets w/ return demo                         Point: Precautions (Done)       Learning Progress Summary             Patient Acceptance, E,TB,D, LILIBETH,CARMEN,NR by GLENNA at 10/17/2023 1014    Comment: education re: purpose of PT/importance of activity, for safety/falls prevention, proper use of rwx w/ emphasis on gait sequencing, placement of rwx, hand placement w/ transitions, T/I AA HS and quad sets w/ return demo                                         User Key       Initials Effective Dates Name Provider Type Discipline     08/02/18 -  Tiffani York, PT Physical Therapist PT                  PT Recommendation and Plan     Plan of Care Reviewed With: patient  Progress: improving  Outcome Evaluation: PT tx completed. Pt reports she is moving RLE better and feels stronger. Although, her movement is improved she still presents with decreased motor control/coordination of RLE during gait. She is unable to ambulate without the use of an assistive device safely. High risk for falls. Recommend rehab or OP therapy.    Outcome Measures       Row Name 10/18/23 0900             How much help from another person do you currently need...    Turning from your back to your side while in flat bed without using bedrails? 4  -KJ      Moving from lying on back to sitting on the side of a flat bed without bedrails? 4  -KJ      Moving to and from a bed to a chair (including a wheelchair)? 3  -KJ      Standing up from a chair using your arms (e.g., wheelchair, bedside chair)? 3  -KJ      Climbing 3-5 steps with a railing? 3  -KJ      To walk in hospital room? 3  -KJ      AM-PAC 6 Clicks Score (PT) 20  -KJ      Highest level of mobility 6 --> Walked 10 steps or more  -KJ         Functional Assessment    Outcome Measure Options AM-PAC 6 Clicks Basic Mobility (PT)  -KJ                User Key  (r) = Recorded By, (t) = Taken By, (c) = Cosigned By      Initials Name Provider Type    Bri Hoffman PTA Physical Therapist Assistant                     Time Calculation:    PT Charges       Row Name 10/18/23 0953             Time Calculation    Start Time 0839  -KJ      Stop Time 0910  -KJ      Time Calculation (min) 31 min  -KJ      PT Received On 10/18/23  -KJ      PT Goal Re-Cert Due Date 10/27/23  -KJ         Time Calculation- PT    Total Timed Code Minutes- PT 31 minute(s)  -KJ                User Key  (r) = Recorded By, (t) = Taken By, (c) = Cosigned By      Initials Name Provider Type    Bri Hoffman PTA Physical Therapist Assistant                  Therapy Charges for Today       Code Description Service Date Service Provider Modifiers Qty    36767112235 HC GAIT TRAINING EA 15 MIN 10/17/2023 Bri Phillips, PTA GP 1    70317353375 HC PT THER PROC EA 15 MIN 10/17/2023 Bri Phillips, PTA GP 1    48468786629 HC GAIT TRAINING EA 15 MIN 10/18/2023 Bri Phillips, PTA GP 1    53636865749 HC PT THER PROC EA 15 MIN 10/18/2023 Bri Phillips, PTA GP 1            PT G-Codes  Outcome Measure Options: AM-PAC 6 Clicks Basic Mobility  (PT)  AM-PAC 6 Clicks Score (PT): 20  AM-PAC 6 Clicks Score (OT): 21    Bri Phillips PTA  10/18/2023      Electronically signed by Bri Phillips, JULIO at 10/18/23 0959       Bri Phillips PTA at 10/18/23 1523  Version 1 of 1         Acute Care - Physical Therapy Treatment Note   [place]     Patient Name: Charlene Bey  : 1955  MRN: [id number]  Today's Date: 10/18/2023      Visit Dx:     ICD-10-CM ICD-9-CM   1. Gait abnormality [R26.9]  R26.9 781.2     Patient Active Problem List   Diagnosis    Paresthesia and pain of right extremity    Type 2 diabetes mellitus with hyperglycemia    Hypothyroidism (acquired)    Chronic pancreatitis    Nonintractable migraine    Dizziness, nonspecific     Past Medical History:   Diagnosis Date    Cystocele with rectocele     Diabetes mellitus     Goiter     Hashimoto's thyroiditis     Headache     Hypothyroidism     Mumps pancreatitis     Pancreatitis     Peptic ulceration     Thyroid nodule      Past Surgical History:   Procedure Laterality Date    BLADDER SUSPENSION      CHOLECYSTECTOMY      COLOSTOMY      CYSTOCELE REPAIR      HERNIA REPAIR      HYSTERECTOMY      OVARIAN CYST REMOVAL      RECTOCELE REPAIR      SMALL INTESTINE SURGERY      TONSILLECTOMY       PT Assessment (last 12 hours)       PT Evaluation and Treatment       Row Name 10/18/23 1430 10/18/23 0839       Physical Therapy Time and Intention    Subjective Information no complaints  -KJ --  states she does feel stronger today  -KJ    Document Type therapy note (daily note)  -KJ therapy note (daily note)  -KJ    Mode of Treatment physical therapy  -KJ physical therapy  -KJ    Patient Effort good  -KJ good  -KJ      Row Name 10/18/23 1430 10/18/23 0839       General Information    Existing Precautions/Restrictions fall  RLE weakness; decreased motor control RLE  -KJ fall  RLE weakness; decreased motor control RLE  -KJ      Row Name 10/18/23 1430 10/18/23 0839       Pain    Pretreatment Pain Rating 0/10 -  no pain  -KJ 0/10 - no pain  -KJ    Posttreatment Pain Rating 0/10 - no pain  -KJ 0/10 - no pain  -KJ      Row Name 10/18/23 1430 10/18/23 0839       Bed Mobility    Supine-Sit [place] (Bed Mobility) independent  -KJ independent  -KJ      Row Name 10/18/23 1430 10/18/23 0839       Sit-Stand Transfer    Sit-Stand [place] (Transfers) contact guard;verbal cues  -KJ contact guard;verbal cues  -KJ    Assistive Device (Sit-Stand Transfers) walker, front-wheeled  -KJ walker, front-wheeled  -KJ      Row Name 10/18/23 1430 10/18/23 0839       Stand-Sit Transfer    Stand-Sit [place] (Transfers) supervision  -KJ supervision  -KJ    Assistive Device (Stand-Sit Transfers) walker, front-wheeled  -KJ walker, front-wheeled  -KJ      Row Name 10/18/23 1430 10/18/23 0839       Gait/Stairs (Locomotion)    [place] Level (Gait) verbal cues;minimum assist (75% patient effort)  -KJ verbal cues;minimum assist (75% patient effort)  -KJ    Assistive Device (Gait) walker, front-wheeled  -KJ --    Distance in Feet (Gait) 10' without walker, pt requires min/mod assist for amb. 25' with r wx [place] for balance and decreased balance with turns  -KJ 10 steps without wx, required min/mod assist for balance. 30' with Rwx CG/Antonio  -KJ    Deviations/Abnormal Patterns (Gait) ataxic;gait speed decreased;steppage;stride length decreased;weight shifting decreased  -KJ ataxic;gait speed decreased;steppage;stride length decreased;weight shifting decreased  -KJ    Bilateral Gait Deviations heel strike decreased  -KJ --    Right Sided Gait Deviations heel strike decreased;weight shift ability decreased  -KJ heel strike decreased;weight shift ability decreased  -KJ    Comment, (Gait/Stairs) -- continued decreased normal motor planning of RLE during gait. Decreased hip/knee flex unless over exaggerated. Unable safely to ambulate without the use of wx  -KJ      Row Name 10/18/23 0839          Balance    Balance Assessment sitting static balance   -KJ     Static Sitting Balance independent  -KJ     Dynamic Sitting Balance independent  -KJ     Position, Sitting Balance sitting edge of bed;unsupported  -KJ     Sit to Stand [place] Balance contact guard  -KJ     Static Standing Balance contact guard  -KJ     Dynamic Standing Balance minimal assist;moderate assist  -KJ     Position/Device Used, Standing Balance supported  -KJ       Row Name 10/18/23 1430 10/18/23 0839       Aerobic Exercise    Comment, Aerobic Exercise (Therapeutic Exercise) standing at rail, performed standing lateral weight shifting, A&P shifting, tandem stance, step forward/backward weight shifting, squats, toe raises, dynamic/static standing perturbations.  -KJ standing hip flex,abd, perturbations no holding to on for support, tandem stance unable to hold. A & P weight shifts, lateral weight shifts.  -KJ      Row Name 10/18/23 1430 10/18/23 0839       Positioning and Restraints    Pre-Treatment Position in bed  -KJ in bed  -KJ    Post Treatment Position bed  -KJ chair  -KJ    In Bed call light within reach  -KJ call light within reach  -KJ              User Key  (r) = Recorded By, (t) = Taken By, (c) = Cosigned By      Initials Name Provider Type    Bri Hoffman, JULIO Physical Therapist Assistant                    Physical Therapy Education       Title: PT OT SLP Therapies (Done)       Topic: Physical Therapy (Done)       Point: Mobility training (Done)       Learning Progress Summary             Patient Acceptance, E,TB,DAVIDA, LILIBETH,CARMEN,NR by GLENNA at 10/17/2023 1014    Comment: education re: purpose of PT/importance of activity, for safety/falls prevention, proper use of rwx w/ emphasis on gait sequencing, placement of rwx, hand placement w/ transitions, T/I AA HS and quad sets w/ return demo                         Point: Home exercise program (Done)       Learning Progress Summary             Patient Acceptance, E,TB,DAVIDA, LILIBETH,CARMEN,NR by GLENNA at 10/17/2023 1014    Comment: education re: purpose of  PT/importance of activity, for safety/falls prevention, proper use of rwx w/ emphasis on gait sequencing, placement of rwx, hand placement w/ transitions, T/I AA HS and quad sets w/ return demo                         Point: Precautions (Done)       Learning Progress Summary             Patient Acceptance, E,TB,D, VU,DU,NR by  at 10/17/2023 1014    Comment: education re: purpose of PT/importance of activity, for safety/falls prevention, proper use of rwx w/ emphasis on gait sequencing, placement of rwx, hand placement w/ transitions, T/I AA HS and quad sets w/ return demo                                         User Key       Initials Effective Dates Name Provider Type Discipline     08/02/18 -  Tiffani York, PT Physical Therapist PT                  PT Recommendation and Plan     Plan of Care Reviewed With: patient  Progress: improving  Outcome Evaluation: PT tx completed. Pt reports she is moving RLE better and feels stronger. Although, her movement is improved she still presents with decreased motor control/coordination of RLE during gait. She is unable to ambulate without the use of an assistive device safely. High risk for falls. Recommend rehab or OP therapy.   Outcome Measures       Row Name 10/18/23 0900             How much help from another person do you currently need...    Turning from your back to your side while in flat bed without using bedrails? 4  -KJ      Moving from lying on back to sitting on the side of a flat bed without bedrails? 4  -KJ      Moving to and from a bed to a chair (including a wheelchair)? 3  -KJ      Standing up from a chair using your arms (e.g., wheelchair, bedside chair)? 3  -KJ      Climbing 3-5 steps with a railing? 3  -KJ      To walk in hospital room? 3  -KJ      AM-PAC 6 Clicks Score (PT) 20  -KJ      Highest level of mobility 6 --> Walked 10 steps or more  -KJ         Functional Assessment    Outcome Measure Options AM-PAC 6 Clicks Basic Mobility (PT)  -KJ                 User Key  (r) = Recorded By, (t) = Taken By, (c) = Cosigned By      Initials Name Provider Type    Bri Hoffman PTA Physical Therapist Assistant                     Time Calculation:    PT Charges       Row Name 10/18/23 1521 10/18/23 0953          Time Calculation    Start Time 1430  -KJ 0839  -KJ     Stop Time 1511  -KJ 0910  -KJ     Time Calculation (min) 41 min  -KJ 31 min  -KJ     PT Received On -- 10/18/23  -KJ     PT Goal Re-Cert Due Date -- 10/27/23  -KJ        Time Calculation- PT    Total Timed Code Minutes- PT -- 31 minute(s)  -KJ               User Key  (r) = Recorded By, (t) = Taken By, (c) = Cosigned By      Initials Name Provider Type    Bri Hoffman PTA Physical Therapist Assistant                  Therapy Charges for Today       Code Description Service Date Service Provider Modifiers Qty    10849442563 HC GAIT TRAINING EA 15 MIN 10/17/2023 Bri Phillips, PTA GP 1    03006206705 HC PT THER PROC EA 15 MIN 10/17/2023 Bri Phillips, PTA GP 1    76784531976 HC GAIT TRAINING EA 15 MIN 10/18/2023 Bri Phillips, PTA GP 1    33833825172 HC PT THER PROC EA 15 MIN 10/18/2023 Bri Phillips, PTA GP 1    74537947610 HC GAIT TRAINING EA 15 MIN 10/18/2023 Bri Phillips, PTA GP 1    78065992178 HC PT THER PROC EA 15 MIN 10/18/2023 Bri Phillips, PTA GP 2            PT G-Codes  Outcome Measure Options: AM-PAC 6 Clicks Daily Activity (OT)  AM-PAC 6 Clicks Score (PT): 20  AM-PAC 6 Clicks Score (OT): 21    Bri Phillips PTA  10/18/2023      Electronically signed by Bri Phillips PTA at 10/18/23 1523

## 2023-10-19 NOTE — PROGRESS NOTES
[facility] Medicine Services  INPATIENT PROGRESS NOTE    Patient Name: Charlene Bey  Date of Admission: 10/16/2023  Today's Date: 10/19/23  Length of Stay: 0  Primary Care Physician: Alex Saravia APRN    Subjective   Chief Complaint: Right leg numbness  HPI   Ms. Bey is a 67-year-old female who presented to [facility] on 10/16 for right lower extremity paresthesia.  Patient was COVID-positive on 10/11/2023.  She started Paxlovid and had improvement in symptoms.  On 10/16, she awoke in her usual state of health when she noted sudden numbness to right foot.  Numbness progressed to the entire right side to upper abdomen. She also noted right lower extremity weakness and had difficulty raising right leg. She also notes pain that begins in abdomen and radiates to her back similar to prior pancreatitis attacks.  She originally presented to [facility] emergency department. CT head was negative, CT abd/pelvis showed large hiatal hernia, moderate amount of stool, fat containing umbilical and supra umbilical hernia, left and right ingquinal hernias. amylase/lipase WNL. TSH 9.92. Dr. JOSE Overton discussed case with Dr. ELLE Lynn and patient accepted for transfer.      Today  Lying in bed.  No oxygen in use.  No visitors in room.  She reports right lower extremity feels dull yet upper thigh feels more sharp.  Numbness from mid abdomen below ribs down right lower extremity.  No numbness right upper extremity or left side.  No complaints of chest pain, palpitations or shortness of breath.  Therapy recommends inpatient rehab due to fall risk due to left lower extremity weakness.  Neurology following Dr. Lynn suspects post-COVID plexitis.  Patient tolerating Neurontin per neurology      Review of Systems   Constitutional:  Positive for activity change. Negative for chills, fatigue and fever.   HENT:  Negative for congestion and trouble swallowing.    Eyes:  Negative  for photophobia and visual disturbance.   Respiratory:  Negative for cough, shortness of breath and wheezing.    Cardiovascular:  Negative for chest pain, palpitations and leg swelling.   Gastrointestinal:  Negative for constipation, diarrhea, nausea and vomiting.   Endocrine: Negative for cold intolerance, heat intolerance and polyuria.   Genitourinary:  Negative for dysuria, frequency and urgency.   Musculoskeletal:  Positive for gait problem.   Skin:  Negative for color change, pallor, rash and wound.   Allergic/Immunologic: Negative for immunocompromised state.   Neurological:  Positive for weakness (Right lower extremity) and numbness (Right lower extremity).   Hematological:  Negative for adenopathy. Does not bruise/bleed easily.   Psychiatric/Behavioral:  Negative for agitation, behavioral problems and confusion.       All pertinent negatives and positives are as above. All other systems have been reviewed and are negative unless otherwise stated.     Objective    Temp:  [97.8 °F (36.6 °C)-98.6 °F (37 °C)] 97.8 °F (36.6 °C)  Heart Rate:  [51-77] 77  Resp:  [16-20] 18  BP: (117-145)/(46-66) 145/58  Physical Exam  Vitals and nursing note reviewed.   Constitutional:       Comments: Eating up in bed.  No oxygen in use.  No visitors in room.   HENT:      Head: Normocephalic and atraumatic.      Nose: No congestion.      Mouth/Throat:      Pharynx: Oropharynx is clear. No oropharyngeal exudate or posterior oropharyngeal erythema.   Eyes:      Extraocular Movements: Extraocular movements intact.      Pupils: Pupils are equal, round, and reactive to light.   Cardiovascular:      Rate and Rhythm: Normal rate and regular rhythm.      Heart sounds: No murmur heard.     Comments: Normal sinus rhythm 52 on telemetry.  Pulmonary:      Breath sounds: No wheezing, rhonchi or rales.      Comments: No oxygen in use.  Abdominal:      Palpations: Abdomen is soft.      Tenderness: There is no abdominal tenderness.    Genitourinary:     Comments: Voiding.  Musculoskeletal:      Cervical back: Normal range of motion and neck supple.   Skin:     General: Skin is warm and dry.   Neurological:      Mental Status: She is alert and oriented to person, place, and time.      Comments: Numbness, tingling right lower extremity with dullness right lower extremity knee down and sharp sensation upper thigh up to mid abdomen.  His extremities equally.  Right lower extremity slightly weaker than left.  No word finding issues.   Psychiatric:         Mood and Affect: Mood normal.         Behavior: Behavior normal.         Thought Content: Thought content normal.         Judgment: Judgment normal.       Results Review:  I have reviewed the labs, radiology results, and diagnostic studies.    Laboratory Data:   Results from last 7 days   Lab Units 10/19/23  0510 10/18/23  0350 10/17/23  0445   WBC 10*3/mm3 7.78 8.07 8.26   HEMOGLOBIN g/dL 13.0 12.9 12.1   HEMATOCRIT % 40.3 39.9 38.1   PLATELETS 10*3/mm3 174 187 161     Results from last 7 days   Lab Units 10/19/23  0510 10/18/23  0350 10/17/23  0445   SODIUM mmol/L 140 139 140   POTASSIUM mmol/L 4.1 3.9 4.0   CHLORIDE mmol/L 104 103 105   CO2 mmol/L 26.0 27.0 28.0   BUN mg/dL 16 15 14   CREATININE mg/dL 0.61 0.65 0.76   CALCIUM mg/dL 9.5 9.8 9.1   BILIRUBIN mg/dL 0.2 0.2 0.2   ALK PHOS U/L 72 71 58   ALT (SGPT) U/L 16 15 14   AST (SGOT) U/L 15 15 13   GLUCOSE mg/dL 199* 189* 153*     Imaging Results (All)       Procedure Component Value Units Date/Time    MRI Brain With & Without Contrast [[id number]] Collected: 10/17/23 1847     Updated: 10/17/23 1856    Narrative:      EXAM/TECHNIQUE: MRI brain without and with IV contrast     INDICATION: RLE weakness and numbness; R26.9-Unspecified abnormalities  of gait and mobility     COMPARISON: None     FINDINGS:     No diffusion restriction to suggest acute infarction. No increased  susceptibility to suggest acute or chronic intracranial hemorrhage.  The  major physiologic flow voids are maintained.     No abnormality of brain parenchyma signal intensity. No midline shift or  mass effect. Lateral ventricles are nondilated. Basilar cisterns are  patent. The sella turcica and its contents appear unremarkable.  Following contrast administration, no abnormal foci of enhancement are  identified.     No acute orbital finding. Small left mastoid effusion. Paranasal sinuses  are clear.       Impression:         No acute intracranial findings.     This report was signed and finalized on 10/17/2023 6:52 PM CDT by Dr. Luis Chawla MD.       MRI Thoracic Spine With & Without Contrast [[id number]] Collected: 10/16/23 2058     Updated: 10/16/23 2107    Narrative:      EXAM/TECHNIQUE: MRI of thoracic spine without and with IV contrast     INDICATION: Right-sided abdominal paresthesias, right lower extremity  weakness and paresthesia     COMPARISON: None     FINDINGS:     Thoracic kyphosis and alignment are maintained. Vertebral bodies are of  normal height and signal intensity. No significant vertebral body marrow  edema. Mild diffuse disc desiccation. Mild multilevel thoracic spine  degenerative change with small endplate osteophytes and mild facet  arthropathy. No area of high-grade central canal or neural foraminal  stenosis. Multiple small disc bulges are present throughout the thoracic  spine. No signal in the thoracic cord which can be corroborated on more  than one plane of imaging. Paravertebral soft tissues are unremarkable.  Large hiatal hernia. Following contrast administration, no abnormal foci  of enhancement are identified.          Impression:      1.  No acute findings.  2.  Mild multilevel degenerative change.   3.  Large hiatal hernia.        This report was signed and finalized on 10/16/2023 9:04 PM CDT by Dr. Luis Chawla MD.       MRI Lumbar Spine With & Without Contrast [[id number]] Collected: 10/16/23 1950     Updated: 10/16/23 2000    Narrative:       EXAM/TECHNIQUE: MRI lumbar spine without and with IV contrast     INDICATION: Right-sided paresthesias, right lower extremity weakness     COMPARISON: None     FINDINGS:     This report assumes 5 lumbar type vertebral bodies. 8 mm anterolisthesis  of L4 on L5. No other subluxation. Lumbar spine alignment is maintained.  No significant vertebral body marrow edema. No vertebral body height  loss. Mild multilevel mixed Modic type degenerative endplate changes  greatest at L4-L5 and L5-S1. Discs are moderately desiccated throughout.  Moderate multilevel lumbar spine degenerative changes described in  further detail level by level below. The distal cord/conus appears  normal. Prevertebral soft tissues are unremarkable. Following contrast  administration, no abnormal foci of enhancement are identified. No  epidural collections. No paraspinal collection     Multilevel degenerative change:  L1-L2: Disc bulge flattens the ventral thecal sac mildly narrowing the  central canal. Facet arthropathy mildly narrows both neural foramina.     L2-L3: Disc bulge flattens the ventral thecal sac. Along with facet  arthropathy, there is mild central canal narrowing and mild bilateral  neural foraminal narrowing.     L3-L4: Small disc bulge and facet arthropathy causes mild to moderate  central canal stenosis and mild bilateral neural foraminal stenosis.     L4-L5: Small disc bulge and facet arthropathy causes mild central canal  stenosis and mild left neural foraminal stenosis. There is moderate  right neural foraminal stenosis with disc/osteophyte material closely  approximating and likely abutting the exiting right L4 nerve root.     L5-S1: Central canal is widely patent. Anterolisthesis and disc bulge  causes mild left neural foraminal stenosis and moderate right neural  foraminal stenosis. Disc/osteophyte material closely approximates and  likely abuts the exiting right L5 nerve root.       Impression:         1.  No acute  osseous findings.  2.  8 mm anterolisthesis of L5 on S1.  3.  Moderate multilevel degenerative change, as described above. No area  of severe central canal stenosis. There is moderate right sided neural  foraminal stenosis at L4-L5 and L5-S1 with disc/osteophyte material  closely approximating and likely abutting the exiting right L4 and L5  nerve roots.        This report was signed and finalized on 10/16/2023 7:56 PM CDT by Dr. Luis Chawla MD.                Scheduled medications:  atorvastatin, 80 mg, Oral, Nightly  gabapentin, 300 mg, Oral, TID  insulin detemir, 10 Units, Subcutaneous, Nightly  insulin lispro, 3-14 Units, Subcutaneous, 4x Daily AC & at Bedtime  lactobacillus acidophilus, 1 capsule, Oral, Daily  levothyroxine, 175 mcg, Oral, Q AM  magnesium (as) gluconate, 27 mg, Oral, BID  montelukast, 10 mg, Oral, Nightly  pancrelipase (Lip-Prot-Amyl), 12,000 units of lipase, Oral, TID With Meals  senna-docusate sodium, 2 tablet, Oral, BID  sertraline, 100 mg, Oral, Daily  sodium chloride, 10 mL, Intravenous, Q12H  vitamin B-12, 1,000 mcg, Oral, Daily  vitamin D3, 5,000 Units, Oral, Daily       I have reviewed the patient's current medications.     Assessment/Plan   Assessment  Active Hospital Problems    Diagnosis     **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     Type 2 diabetes mellitus with hyperglycemia     Hypothyroidism (acquired)     Chronic pancreatitis     Nonintractable migraine     Dizziness, nonspecific      Treatment Plan:  1.  Paresthesia right lower extremity.  MRI shows no acute findings.  MRI spine showed moderate multilevel degenerative changes and 8 mm anterolisthesis of L5 on S1.  MRI no acute findings.  Neurology consulted and Dr. Lynn suspects post-COVID plexitis.  Patient declines lumbar puncture.  Patient started on gabapentin 300 mg 3 times daily per neurology.  Reports right lower extremity paresthesia with dullness lower extremity low knee and  sharp sensation right upper thigh.    2.  Hyperlipidemia.  .  Continue atorvastatin 80 mg orally nightly.    3.  Diabetes mellitus type 2.  Hemoglobin A1c 7.8.  Accu-Cheks with sliding scale insulin coverage.  Glucoses 198, 199.  Detemir 10 units nightly.  Hold glipizide.    4.  Hypothyroidism.  TSH 4.37, free T4.96.  Continue Synthroid.    5.  Chronic pancreatitis.  Continue pancrelipase    6.  Physical therapy and Occupational Therapy consults.  Patient needs cues for gait training and sequencing.  Decreased balance with turns and tends to fall toward turn.  Gait appears right lower extremity to overshoot foot placement.  Knee hyperextends.  Recommend inpatient rehab.  [facility] and awaiting decision.    7.  SCDs for deep vein thrombosis prophylaxis.      Medical Decision Making  Number and Complexity of problems: 5  Paresthesia right lower extremity suspect secondary to post-COVID plexitis: Acute, high complexity posing threat to life and bodily function.  Hyperlipidemia: Chronic, moderate complexity, stable  Diabetes mellitus type 2: Chronic, moderate complexity, not at baseline  Hypothyroidism: Chronic, moderate complexity, stable  Chronic pancreatitis: Chronic, low complexity, stable    Differential Diagnosis: None    Conditions and Status        Condition is improving.     [facility] Data  External documents reviewed: PCP office visit 9/22/2023 reviewed on 10/19/2023.  Cardiac tracing (EKG, telemetry) interpretation: Normal sinus rhythm 52 on telemetry.  Radiology interpretation: Reviewed radiology interpretation MRI of the brain, MRI thoracic spine  Labs reviewed:   CMP 10/19/2023.  Repeat BMP in AM.  CBC 10/19/2023.  Glucoses 198, 170, 199.    Any tests that were considered but not ordered: None     Decision rules/scores evaluated (example [id number], Wells, etc): None     Discussed with: Dr. Avery and patient.     Care Planning  Shared decision making: Dr. Avery and patient.  Patient  agrees to gabapentin per Dr. Lynn.  Agrees to inpatient rehab if bed offered and insurance approves.  Code status and discussions: Full code  The patient surrogate decision maker is her , Duong    Disposition  Social Determinants of Health that impact treatment or disposition: None  I expect the patient to be discharged to [facility] if bed offered and insurance approves.    Electronically signed by GREGORIO Leonard, 10/19/23, 13:45 CDT.

## 2023-10-19 NOTE — THERAPY TREATMENT NOTE
Acute Care - Physical Therapy Treatment Note   [place]     Patient Name: Charlene Bey  : 1955  MRN: [id number]  Today's Date: 10/19/2023      Visit Dx:     ICD-10-CM ICD-9-CM   1. 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    Type 2 diabetes mellitus with hyperglycemia    Hypothyroidism (acquired)    Chronic pancreatitis    Nonintractable migraine    Dizziness, nonspecific     Past Medical History:   Diagnosis Date    Cystocele with rectocele     Diabetes mellitus     Goiter     Hashimoto's thyroiditis     Headache     Hypothyroidism     Mumps pancreatitis     Pancreatitis     Peptic ulceration     Thyroid nodule      Past Surgical History:   Procedure Laterality Date    BLADDER SUSPENSION      CHOLECYSTECTOMY      COLOSTOMY      CYSTOCELE REPAIR      HERNIA REPAIR      HYSTERECTOMY      OVARIAN CYST REMOVAL      RECTOCELE REPAIR      SMALL INTESTINE SURGERY      TONSILLECTOMY       PT Assessment (last 12 hours)       PT Evaluation and Treatment       Row Name 10/19/23 [facility] 10/19/23 0950       Physical Therapy Time and Intention    Subjective Information no complaints  -KJ no complaints  -KJ    Document Type therapy note (daily note)  -KJ therapy note (daily note)  -KJ    Mode of Treatment physical therapy  -KJ physical therapy  -KJ    Patient Effort good  -KJ good  -KJ      Row Name 10/19/23 134 10/19/23 0950       General Information    Existing Precautions/Restrictions fall  R side weakness, coordination/motor control/sensation  -KJ fall  R side weakness, coordination/motor control/sensation  -KJ      Row Name 10/19/23 134 10/19/23 0950       Pain    Pretreatment Pain Rating 0/10 - no pain  -KJ 0/10 - no pain  -KJ    Posttreatment Pain Rating 0/10 - no pain  -KJ 0/10 - no pain  -KJ      Row Name 10/19/23 134 10/19/23 0950       Bed Mobility    Supine-Sit [place] (Bed Mobility)  independent  -KJ independent  -KJ      Row Name 10/19/23 1345 10/19/23 0950       Sit-Stand Transfer    Sit-Stand [place] (Transfers) contact guard  -KJ contact guard  -KJ    Assistive Device (Sit-Stand Transfers) walker, front-wheeled  -KJ walker, front-wheeled  -KJ      Row Name 10/19/23 1345 10/19/23 0950       Stand-Sit Transfer    Stand-Sit [place] (Transfers) supervision  -KJ supervision  -KJ    Assistive Device (Stand-Sit Transfers) walker, front-wheeled  -KJ walker, front-wheeled  -KJ      Row Name 10/19/23 1345 10/19/23 0950       Gait/Stairs (Locomotion)    [place] Level (Gait) verbal cues;minimum assist (75% patient effort)  -KJ verbal cues;minimum assist (75% patient effort)  -KJ    Assistive Device (Gait) walker, front-wheeled  -KJ walker, front-wheeled  -KJ    Distance in Feet (Gait) 30' working on balance and gait technique  -KJ 10' without A.D balance is very poor.  Increased lateral sway and loss of balance, decreased motor/coordination RLE. Unable to safely ambulate without the assistance of r wx. 30' with A.D. Requires cues for RLE placement. She over shoots foot placement, almost robotoic like movements  -KJ    Deviations/Abnormal Patterns (Gait) right sided deviations;nalini decreased;gait speed decreased;stride length decreased;weight shifting decreased;steppage  -KJ right sided deviations;nalini decreased;gait speed decreased;stride length decreased;weight shifting decreased;steppage  -KJ    Right Sided Gait Deviations decreased knee extension;forward flexed posture;heel strike decreased;weight shift ability decreased  -KJ decreased knee extension;forward flexed posture;heel strike decreased;weight shift ability decreased  -KJ    Comment, (Gait/Stairs) -- RLE hyperextends with static standing. [place] with turns utilizing r wx.  -KJ      Row Name 10/19/23 0986          Balance    Balance Assessment sitting static balance  -KJ     Static Sitting Balance independent  -KJ      Dynamic Sitting Balance independent  -KJ     Position, Sitting Balance unsupported;sitting edge of bed  -KJ     Sit to Stand Dynamic Balance contact guard  -KJ     Static Standing Balance minimal assist  -KJ     Dynamic Standing Balance moderate assist  -KJ     Position/Device Used, Standing Balance unsupported  -KJ     Balance Interventions tandem standing;narrowed base of support;motor strategy training;core stability exercise;minimal challenge;dynamic;supported;standing;weight shifting activity;manual resistance applied during activity  -KJ       Row Name 10/19/23 1345 10/19/23 0950       Aerobic Exercise    Comment, Aerobic Exercise (Therapeutic Exercise) standing lateral weight shifting, perturbations with feet together, diagonal A & P weight shifting,  -KJ standing: hip flex, marching, hamstring curls RLE, side stepping, lateral/A&P  weight shifting, perturbation with ankles/feet together, lunges forward/backward, ankle stabs in standing  -KJ      Row Name 10/19/23 1345 10/19/23 0950       Positioning and Restraints    Pre-Treatment Position in bed  -KJ in bed  -KJ    Post Treatment Position bed  -KJ bed  -KJ    In Bed call light within reach  -KJ call light within reach;sitting EOB  -KJ              User Key  (r) = Recorded By, (t) = Taken By, (c) = Cosigned By      Initials Name Provider Type    Bri Hoffman, PTA Physical Therapist Assistant                    Physical Therapy Education       Title: PT OT SLP Therapies (Done)       Topic: Physical Therapy (Done)       Point: Mobility training (Done)       Learning Progress Summary             Patient Acceptance, E,TB,D, LILIBETH,DU,NR by GLENNA at 10/17/2023 1014    Comment: education re: purpose of PT/importance of activity, for safety/falls prevention, proper use of rwx w/ emphasis on gait sequencing, placement of rwx, hand placement w/ transitions, T/I AA HS and quad sets w/ return demo                         Point: Home exercise program (Done)        Learning Progress Summary             Patient Acceptance, E,TB,D, VU,DU,NR by GLENNA at 10/17/2023 1014    Comment: education re: purpose of PT/importance of activity, for safety/falls prevention, proper use of rwx w/ emphasis on gait sequencing, placement of rwx, hand placement w/ transitions, T/I AA HS and quad sets w/ return demo                         Point: Precautions (Done)       Learning Progress Summary             Patient Acceptance, E,TB,D, VU,DU,NR by GLENNA at 10/17/2023 1014    Comment: education re: purpose of PT/importance of activity, for safety/falls prevention, proper use of rwx w/ emphasis on gait sequencing, placement of rwx, hand placement w/ transitions, T/I AA HS and quad sets w/ return demo                                         User Key       Initials Effective Dates Name Provider Type Discipline     08/02/18 -  Tiffani York, PT Physical Therapist PT                  PT Recommendation and Plan     Plan of Care Reviewed With: patient  Progress: improving  Outcome Evaluation: PT tx completed. Pt has no c/o this am. Mobilizing somewhat improved, but balance decreased and RLE coordination/motor control still remains an issue. Pt requires assistive device for ambulation, without she is unable to ambulate safely. Cues needed for gait technique and sequencing. Decreased balance with turns and tends to fall towards turn. Balance activities and stabilization ex's performed to moderately challenge. Pt gait is appears RLE to overshoot foot placement, knee hyperextends with swing phase of LLE.Decreased sensation RLE. Recommend inpatient rehab at this time due to risk of falls and need for RLE weakness.   Outcome Measures       Row Name 10/19/23 1300 10/19/23 1000 10/18/23 0900       How much help from another person do you currently need...    Turning from your back to your side while in flat bed without using bedrails? -- 4  -KJ 4  -KJ    Moving from lying on back to sitting on the side of a flat bed  without bedrails? -- 4  -KJ 4  -KJ    Moving to and from a bed to a chair (including a wheelchair)? -- 3  -KJ 3  -KJ    Standing up from a chair using your arms (e.g., wheelchair, bedside chair)? -- 3  -KJ 3  -KJ    Climbing 3-5 steps with a railing? -- 2  -KJ 3  -KJ    To walk in hospital room? -- 3  -KJ 3  -KJ    AM-PAC 6 Clicks Score (PT) -- 19  -KJ 20  -KJ    Highest level of mobility -- 6 --> Walked 10 steps or more  -KJ 6 --> Walked 10 steps or more  -KJ       How much help from another is currently needed...    Putting on and taking off regular lower body clothing? 3  -EC -- --    Bathing (including washing, rinsing, and drying) 3  -EC -- --    Toileting (which includes using toilet bed pan or urinal) 3  -EC -- --    Putting on and taking off regular upper body clothing 4  -EC -- --    Taking care of personal grooming (such as brushing teeth) 4  -EC -- --    Eating meals 4  -EC -- --    AM-PAC 6 Clicks Score (OT) 21  -EC -- --       Functional Assessment    Outcome Measure Options AM-PAC 6 Clicks Daily Activity (OT)  -EC AM-PAC 6 Clicks Basic Mobility (PT)  -KJ AM-PAC 6 Clicks Basic Mobility (PT)  -KJ              User Key  (r) = Recorded By, (t) = Taken By, (c) = Cosigned By      Initials Name Provider Type    Bri Hoffman, PTA Physical Therapist Assistant    Loraine Womack, OTR/L Occupational Therapist                     Time Calculation:    PT Charges       Row Name 10/19/23 1605 10/19/23 1038          Time Calculation    Start Time 1545  -KJ 0950  -KJ     Stop Time 1615  -KJ 1018  -KJ     Time Calculation (min) 30 min  -KJ 28 min  -KJ     PT Received On -- 10/19/23  -KJ     PT Goal Re-Cert Due Date -- 10/27/23  -KJ        Time Calculation- PT    Total Timed Code Minutes- PT 30 minute(s)  -KJ 28 minute(s)  -KJ               User Key  (r) = Recorded By, (t) = Taken By, (c) = Cosigned By      Initials Name Provider Type    Bri Hoffman, PTA Physical Therapist Assistant                   Therapy Charges for Today       Code Description Service Date Service Provider Modifiers Qty    43856073856 HC GAIT TRAINING EA 15 MIN 10/18/2023 Bri Phillips, PTA GP 1    21515896360 HC PT THER PROC EA 15 MIN 10/18/2023 Bri Phillips, PTA GP 1    58539571662 HC GAIT TRAINING EA 15 MIN 10/18/2023 Bri Phillips, PTA GP 1    07543355123 HC PT THER PROC EA 15 MIN 10/18/2023 Bri Phillips, PTA GP 2    50071174280 HC GAIT TRAINING EA 15 MIN 10/19/2023 Bri Phillips, PTA GP 1    52737303862 HC PT THER PROC EA 15 MIN 10/19/2023 Bri Phillips, PTA GP 1    19670375543 HC GAIT TRAINING EA 15 MIN 10/19/2023 Bri Phillips, PTA GP 1    00774512775 HC PT THER PROC EA 15 MIN 10/19/2023 Bri Phillips, PTA GP 1            PT G-Codes  Outcome Measure Options: AM-PAC 6 Clicks Daily Activity (OT)  AM-PAC 6 Clicks Score (PT): 19  AM-PAC 6 Clicks Score (OT): 21    Bri Phillips PTA  10/19/2023

## 2023-10-19 NOTE — PROGRESS NOTES
Neurology Progress Note      Chief Complaint:  f/u RLE weakness and numbness  Length of Stay:  0   Subjective     Subjective:  She is doing very well this morning.  She is sitting up at the bedside in a chair.  No family/visitor at bedside. She continues to report  that her right lower extremity has improved from a strength standpoint.  She is tolerating Gabapentin 300 mg TID for dysesthesia and  paresthesias.   States she slept very well last PM.   Precert to IP rehab is pending.     NMO IgG ab pending.     Medications:  Current Facility-Administered Medications   Medication Dose Route Frequency Provider Last Rate Last Admin    acetaminophen (TYLENOL) tablet 650 mg  650 mg Oral Q4H PRN Jose Avery MD   650 mg at 10/17/23 2219    Or    acetaminophen (TYLENOL) 160 MG/5ML oral solution 650 mg  650 mg Oral Q4H PRN Jose Avery MD        Or    acetaminophen (TYLENOL) suppository 650 mg  650 mg Rectal Q4H PRN Jose Avery MD        atorvastatin (LIPITOR) tablet 80 mg  80 mg Oral Nightly Sherlyn Fernandes APRN   80 mg at 10/18/23 2050    sennosides-docusate (PERICOLACE) 8.6-50 MG per tablet 2 tablet  2 tablet Oral BID Jose Avery MD   2 tablet at 10/19/23 0835    And    polyethylene glycol (MIRALAX) packet 17 g  17 g Oral Daily PRN Jose Avery MD   17 g at 10/18/23 1649    And    bisacodyl (DULCOLAX) EC tablet 5 mg  5 mg Oral Daily PRN Jose Avery MD        And    bisacodyl (DULCOLAX) suppository 10 mg  10 mg Rectal Daily PRN Jose Avery MD        calcium carbonate (TUMS) chewable tablet 500 mg (200 mg elemental)  1 tablet Oral BID PRN Jose Avery MD        Calcium Replacement - Follow Nurse / BPA Driven Protocol   Does not apply PRN Jose Avery MD        dextrose (D50W) (25 g/50 mL) IV injection 25 g  25 g Intravenous Q15 Min PRN Jose Avery MD        dextrose (GLUTOSE) oral gel 15 g  15 g Oral Q15 Min PRN Jose Avery MD         gabapentin (NEURONTIN) capsule 300 mg  300 mg Oral TID Cirilo Lynn MD   300 mg at 10/19/23 0813    glucagon (GLUCAGEN) injection 1 mg  1 mg Intramuscular Q15 Min PRN Jose Avery MD        HYDROcodone-acetaminophen (NORCO) 7.5-325 MG per tablet 1 tablet  1 tablet Oral Q6H PRN Jose Avery MD   1 tablet at 10/17/23 1444    insulin detemir (LEVEMIR) injection 10 Units  10 Units Subcutaneous Nightly Jose Avery MD   10 Units at 10/18/23 2050    Insulin Lispro (humaLOG) injection 3-14 Units  3-14 Units Subcutaneous 4x Daily AC & at Bedtime Jose Avery MD   3 Units at 10/19/23 0812    lactobacillus acidophilus (RISAQUAD) capsule 1 capsule  1 capsule Oral Daily Jose Avery MD   1 capsule at 10/19/23 0836    levothyroxine (SYNTHROID, LEVOTHROID) tablet 175 mcg  175 mcg Oral Q AM Jose Avery MD   175 mcg at 10/19/23 0540    magnesium (as) gluconate (MAGONATE) tablet 27 mg  27 mg Oral BID Jose Avery MD   27 mg at 10/19/23 0836    Magnesium Standard Dose Replacement - Follow Nurse / BPA Driven Protocol   Does not apply PRN Jose Avery MD        melatonin tablet 5 mg  5 mg Oral Nightly PRN Jose Avery MD   5 mg at 10/18/23 2051    methocarbamol (ROBAXIN) tablet 500 mg  500 mg Oral Daily PRN Jose Avery MD   500 mg at 10/18/23 2051    montelukast (SINGULAIR) tablet 10 mg  10 mg Oral Nightly Jose Avery MD   10 mg at 10/18/23 2050    nitroglycerin (NITROSTAT) SL tablet 0.4 mg  0.4 mg Sublingual Q5 Min PRN Jose Avery MD        ondansetron (ZOFRAN) tablet 4 mg  4 mg Oral Q6H PRN Jose Avery MD        Or    ondansetron (ZOFRAN) injection 4 mg  4 mg Intravenous Q6H PRN Jose Avery MD        pancrelipase (Lip-Prot-Amyl) (CREON) capsule 12,000 units of lipase  12,000 units of lipase Oral TID With Meals Jose Avery MD   12,000 units of lipase at 10/19/23 0756    Phosphorus Replacement - Follow Nurse / BPA  Driven Protocol   Does not apply PRN Jose Avery MD        Potassium Replacement - Follow Nurse / BPA Driven Protocol   Does not apply PRN Jose Avery MD        sertraline (ZOLOFT) tablet 100 mg  100 mg Oral Daily Jose Avery MD   100 mg at 10/19/23 0836    sodium chloride 0.9 % flush 10 mL  10 mL Intravenous Q12H Jose Avery MD   10 mL at 10/19/23 0837    sodium chloride 0.9 % flush 10 mL  10 mL Intravenous PRN Jose Avery MD        sodium chloride 0.9 % infusion 40 mL  40 mL Intravenous PRN Jose Avery MD        vitamin B-12 (CYANOCOBALAMIN) tablet 1,000 mcg  1,000 mcg Oral Daily Jose Avery MD   1,000 mcg at 10/19/23 0836    vitamin D3 capsule 5,000 Units  5,000 Units Oral Daily Jose Avery MD   5,000 Units at 10/19/23 0835             Objective      Vital Signs  Temp:  [97.4 °F (36.3 °C)-98.6 °F (37 °C)] 97.8 °F (36.6 °C)  Heart Rate:  [51-68] 57  Resp:  [16-20] 18  BP: (117-134)/(46-66) 122/53    Telemetry SB - S 49-73    Physical Exam:    General Exam:  Head:  Normocephalic, atraumatic  HEENT:  Neck supple  Fundoscopic Exam:  No signs of disc edema  CVS:  Regular rate and rhythm.  No murmurs  Carotid Examination:  No bruits  Lungs:  Clear to auscultation  Abdomen:  Nontender, nondistended  Extremities:  No signs of peripheral edema  Skin:  No rashes    Neurologic Exam:    Mental Status:    -Alert, Oriented X 3  -No word-finding difficulties  -No aphasia  -No dysarthria  -Follows simple and complex commands    CN II:  Visual fields full.  Pupils equally reactive to light  CN III, IV, VI:  Extraocular Muscles full with no signs of nystagmus  CN V:  Facial sensory is symmetric with no asymmetries.  CN VII:  Facial motor symmetric  CN VIII:  Gross hearing intact bilaterally  CN IX:  Palate elevates symmetrically  CN X:  Palate elevates symmetrically  CN XI:  Shoulder shrug symmetric  CN XII:  Tongue protrudes to midline  Motor: (strength out of 5:  1=  minimal movement, 2 = movement in plane of gravity, 3 = movement against gravity, 4 = movement against some resistance, 5 = full strength)    -Right Upper Ext: Proximal: 5 Distal: 5  -Left Upper Ext: Proximal: 5 Distal: 5    -Right Lower Ext: Proximal: 4 Distal: 4  -Left Lower Ext: Proximal: 5 Distal: 5    DTR:  -Right   Bicep: 2+ Tricep: 2+ Brachoradialis: 2+   Patella: 2+ Ankle: 1+ Neg Babinski  -Left   Bicep: 2+ Tricep: 2+ Brachoradialis: 2+   Patella: 2+ Ankle: 2+ Neg Babinski    Sensory:  Increased sensitivity to touch in RLE.      Coordination:  -Finger to nose intact  -Heel to shin intact      Gait  -up with assist.      Results Review:      Labs:    A1c 7.8  TSH 4.27  T4 0.96  B12 571  ESR 6  CRP <0.3      Imaging:  MRI Brain With & Without Contrast    Result Date: 10/17/2023   No acute intracranial findings.  This report was signed and finalized on 10/17/2023 6:52 PM CDT by Dr. Luis Chawla MD.      MRI Thoracic Spine With & Without Contrast    Result Date: 10/16/2023  1.  No acute findings. 2.  Mild multilevel degenerative change. 3.  Large hiatal hernia.   This report was signed and finalized on 10/16/2023 9:04 PM CDT by Dr. Luis Chawla MD.      MRI Lumbar Spine With & Without Contrast    Result Date: 10/16/2023   1.  No acute osseous findings. 2.  8 mm anterolisthesis of L5 on S1. 3.  Moderate multilevel degenerative change, as described above. No area of severe central canal stenosis. There is moderate right sided neural foraminal stenosis at L4-L5 and L5-S1 with disc/osteophyte material closely approximating and likely abutting the exiting right L4 and L5 nerve roots.   This report was signed and finalized on 10/16/2023 7:56 PM CDT by Dr. Luis Chawla MD.        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  There are no active signs of inflammation with her inflammatory factors being unremarkable.  There are no active signs of a myelitis on the MRI of her spine.  There is no evidence of an autoimmune disease in her brain.  There are no T2 flair abnormalities consistent with this.  She has no visual issues and therefore neuromyelitis optica is less likely.  I have offered her lumbar puncture to pursue further testing but she does not want to proceed with this at this time.  She is having drastic improvement with supportive care.  She wants to hold off for now.  We can [place] back to a lumbar puncture if she fails to reach her baseline or if she has any worsening of her neurologic symptoms.  I would like to give her a neuropathic agent to help with her symptomatic paresthesias.  Covid + on 10/11/2023  DM uncontrolled with hyperglycemia. A1C 7.8  Thyroid disorder  HLD.     Plan:  Follow serum NMO  Continue  gabapentin 300 mg 3 times daily  Discharge planning will be acute rehabilitation  Neurology will sign off.   F/u neurology 4-6 weeks.   Patient wishes not to pursue LP at this time but will notify office if symptoms worsen or return and would then reconsider LP.         Medical Decision Making    Number/Complexity of Problems  Moderate  1 undiagnosed new problem with uncertain prognosis -   1 acute illness with systemic symptoms -   High  1 acute or chronic illness that pose a threat to life/body function -   high     MDM Data  Moderate - 1/3 categories  Extensive - 2/3 categories    Category 1: 3 of the following  Review of external notes  Review of results  Ordering of each unique test  Independent historian  Category 2:  Independent interpretation of test (ex: imaging)  Category 3:  Discussion of management with another provider    extensive     Treatment Plan  Moderate - Prescription Drug management  High  Drug therapy  requiring intensive monitoring for toxicity  Decision regarding hospitalization or escalation of care  De-escalate care/DNR decisions  high       Sherlyn Fernandes, APRN  10/19/23  09:25 CDT

## 2023-10-19 NOTE — THERAPY TREATMENT NOTE
Acute Care - Physical Therapy Treatment Note  [facility]     Patient Name: Charlene Bey  : 1955  MRN: [id number]  Today's Date: 10/19/2023      Visit Dx:     ICD-10-CM ICD-9-CM   1. Gait abnormality [R26.9]  R26.9 781.2     Patient Active Problem List   Diagnosis    Paresthesia and pain of right extremity    Type 2 diabetes mellitus with hyperglycemia    Hypothyroidism (acquired)    Chronic pancreatitis    Nonintractable migraine    Dizziness, nonspecific     Past Medical History:   Diagnosis Date    Cystocele with rectocele     Diabetes mellitus     Goiter     Hashimoto's thyroiditis     Headache     Hypothyroidism     Mumps pancreatitis     Pancreatitis     Peptic ulceration     Thyroid nodule      Past Surgical History:   Procedure Laterality Date    BLADDER SUSPENSION      CHOLECYSTECTOMY      COLOSTOMY      CYSTOCELE REPAIR      HERNIA REPAIR      HYSTERECTOMY      OVARIAN CYST REMOVAL      RECTOCELE REPAIR      SMALL INTESTINE SURGERY      TONSILLECTOMY       PT Assessment (last 12 hours)       PT Evaluation and Treatment       Row Name 10/19/23 0929          Physical Therapy Time and Intention    Subjective Information no complaints  -KJ     Document Type therapy note (daily note)  -KJ     Mode of Treatment physical therapy  -KJ     Patient Effort good  -KJ       Row Name 10/19/23 0921          General Information    Existing Precautions/Restrictions fall  R side weakness, coordination/motor control/sensation  -KJ       Row Name 10/19/23 0910          Pain    Pretreatment Pain Rating 0/10 - no pain  -KJ     Posttreatment Pain Rating 0/10 - no pain  -KJ       Row Name 10/19/23 0918          Bed Mobility    Supine-Sit [place] (Bed Mobility) independent  -KJ       Row Name 10/19/23 0961          Sit-Stand Transfer    Sit-Stand [place] (Transfers) contact guard  -KJ     Assistive Device (Sit-Stand Transfers) walker, front-wheeled  -KJ       Row Name 10/19/23 0988          Stand-Sit Transfer     Stand-Sit [place] (Transfers) supervision  -KJ     Assistive Device (Stand-Sit Transfers) walker, front-wheeled  -KJ       Row Name 10/19/23 0982          Gait/Stairs (Locomotion)    [place] Level (Gait) verbal cues;minimum assist (75% patient effort)  -KJ     Assistive Device (Gait) walker, front-wheeled  -KJ     Distance in Feet (Gait) 10' without A.D balance is very poor.  Increased lateral sway and loss of balance, decreased motor/coordination RLE. Unable to safely ambulate without the assistance of r wx. 30' with A.D. Requires cues for RLE placement. She over shoots foot placement, almost robotoic like movements  -KJ     Deviations/Abnormal Patterns (Gait) right sided deviations;nalini decreased;gait speed decreased;stride length decreased;weight shifting decreased;steppage  -KJ     Right Sided Gait Deviations decreased knee extension;forward flexed posture;heel strike decreased;weight shift ability decreased  -KJ     Comment, (Gait/Stairs) RLE hyperextends with static standing. [place] with turns utilizing r wx.  -KJ       Row Name 10/19/23 0946          Balance    Balance Assessment sitting static balance  -KJ     Static Sitting Balance independent  -KJ     Dynamic Sitting Balance independent  -KJ     Position, Sitting Balance unsupported;sitting edge of bed  -KJ     Sit to Stand Dynamic Balance contact guard  -KJ     Static Standing Balance minimal assist  -KJ     Dynamic Standing Balance moderate assist  -KJ     Position/Device Used, Standing Balance unsupported  -KJ     Balance Interventions tandem standing;narrowed base of support;motor strategy training;core stability exercise;minimal challenge;dynamic;supported;standing;weight shifting activity;manual resistance applied during activity  -KJ       Row Name 10/19/23 0985          Aerobic Exercise    Comment, Aerobic Exercise (Therapeutic Exercise) standing: hip flex, marching, hamstring curls RLE, side stepping, lateral/A&P  weight shifting,  perturbation with ankles/feet together, lunges forward/backward, ankle stabs in standing  -KJ       Row Name 10/19/23 0950          Positioning and Restraints    Pre-Treatment Position in bed  -KJ     Post Treatment Position bed  -KJ     In Bed call light within reach;sitting EOB  -KJ               User Key  (r) = Recorded By, (t) = Taken By, (c) = Cosigned By      Initials Name Provider Type    Bri Hoffman, PTA Physical Therapist Assistant                    Physical Therapy Education       Title: PT OT SLP Therapies (Done)       Topic: Physical Therapy (Done)       Point: Mobility training (Done)       Learning Progress Summary             Patient Acceptance, E,TB,D, VU,DU,NR by  at 10/17/2023 1014    Comment: education re: purpose of PT/importance of activity, for safety/falls prevention, proper use of rwx w/ emphasis on gait sequencing, placement of rwx, hand placement w/ transitions, T/I AA HS and quad sets w/ return demo                         Point: Home exercise program (Done)       Learning Progress Summary             Patient Acceptance, E,TB,D, VU,DU,NR by  at 10/17/2023 1014    Comment: education re: purpose of PT/importance of activity, for safety/falls prevention, proper use of rwx w/ emphasis on gait sequencing, placement of rwx, hand placement w/ transitions, T/I AA HS and quad sets w/ return demo                         Point: Precautions (Done)       Learning Progress Summary             Patient Acceptance, E,TB,D, VU,DU,NR by  at 10/17/2023 1014    Comment: education re: purpose of PT/importance of activity, for safety/falls prevention, proper use of rwx w/ emphasis on gait sequencing, placement of rwx, hand placement w/ transitions, T/I AA HS and quad sets w/ return demo                                         User Key       Initials Effective Dates Name Provider Type Discipline     08/02/18 -  Tiffani York, PT Physical Therapist PT                  PT Recommendation and  Plan     Plan of Care Reviewed With: patient  Progress: improving  Outcome Evaluation: PT tx completed. Pt has no c/o this am. Mobilizing somewhat improved, but balance decreased and RLE coordination/motor control still remains an issue. Pt requires assistive device for ambulation, without she is unable to ambulate safely. Cues needed for gait technique and sequencing. Decreased balance with turns and tends to fall towards turn. Balance activities and stabilization ex's performed to moderately challenge. Pt gait is appears RLE to overshoot foot placement, knee hyperextends with swing phase of LLE.Decreased sensation RLE. Recommend inpatient rehab at this time due to risk of falls and need for RLE weakness.   Outcome Measures       Row Name 10/19/23 1000 10/18/23 0900          How much help from another person do you currently need...    Turning from your back to your side while in flat bed without using bedrails? 4  -KJ 4  -KJ     Moving from lying on back to sitting on the side of a flat bed without bedrails? 4  -KJ 4  -KJ     Moving to and from a bed to a chair (including a wheelchair)? 3  -KJ 3  -KJ     Standing up from a chair using your arms (e.g., wheelchair, bedside chair)? 3  -KJ 3  -KJ     Climbing 3-5 steps with a railing? 2  -KJ 3  -KJ     To walk in hospital room? 3  -KJ 3  -KJ     AM-PAC 6 Clicks Score (PT) 19  -KJ 20  -KJ     Highest level of mobility 6 --> Walked 10 steps or more  -KJ 6 --> Walked 10 steps or more  -KJ        Functional Assessment    Outcome Measure Options AM-PAC 6 Clicks Basic Mobility (PT)  -KJ AM-PAC 6 Clicks Basic Mobility (PT)  -KJ               User Key  (r) = Recorded By, (t) = Taken By, (c) = Cosigned By      Initials Name Provider Type    Bri Hoffman, PTA Physical Therapist Assistant                     Time Calculation:    PT Charges       Row Name 10/19/23 1038             Time Calculation    Start Time 0950  -KJ      Stop Time 1018  -KJ      Time Calculation (min)  28 min  -KJ      PT Received On 10/19/23  -KJ      PT Goal Re-Cert Due Date 10/27/23  -KJ         Time Calculation- PT    Total Timed Code Minutes- PT 28 minute(s)  -KJ                User Key  (r) = Recorded By, (t) = Taken By, (c) = Cosigned By      Initials Name Provider Type    Bri Hoffman, JULIO Physical Therapist Assistant                  Therapy Charges for Today       Code Description Service Date Service Provider Modifiers Qty    52483920981 HC GAIT TRAINING EA 15 MIN 10/18/2023 Bri Phillips, PTA GP 1    01292672962 HC PT THER PROC EA 15 MIN 10/18/2023 Bri Phillips, PTA GP 1    22282839360 HC GAIT TRAINING EA 15 MIN 10/18/2023 Bri Phillips, PTA GP 1    16223701378 HC PT THER PROC EA 15 MIN 10/18/2023 Bri Phillips, PTA GP 2    90588456483 HC GAIT TRAINING EA 15 MIN 10/19/2023 Bri Phillips, PTA GP 1    18472569441 HC PT THER PROC EA 15 MIN 10/19/2023 Bri Phillips, PTA GP 1            PT G-Codes  Outcome Measure Options: AM-PAC 6 Clicks Basic Mobility (PT)  AM-PAC 6 Clicks Score (PT): 19  AM-PAC 6 Clicks Score (OT): 21    Bri Phillips PTA  10/19/2023

## 2023-10-20 ENCOUNTER — READMISSION MANAGEMENT (OUTPATIENT)
Dept: CALL CENTER | Facility: HOSPITAL | Age: 68
End: 2023-10-20
Payer: COMMERCIAL

## 2023-10-20 VITALS
HEIGHT: 67 IN | RESPIRATION RATE: 18 BRPM | BODY MASS INDEX: 30.29 KG/M2 | SYSTOLIC BLOOD PRESSURE: 115 MMHG | OXYGEN SATURATION: 96 % | WEIGHT: 193 LBS | TEMPERATURE: 97.8 F | HEART RATE: 53 BPM | DIASTOLIC BLOOD PRESSURE: 52 MMHG

## 2023-10-20 PROBLEM — E78.2 MIXED HYPERLIPIDEMIA: Status: ACTIVE | Noted: 2023-10-20

## 2023-10-20 LAB
ALBUMIN SERPL-MCNC: 4.3 G/DL (ref 3.5–5.2)
ALBUMIN/GLOB SERPL: 1.6 G/DL
ALP SERPL-CCNC: 72 U/L (ref 39–117)
ALT SERPL W P-5'-P-CCNC: 15 U/L (ref 1–33)
ANION GAP SERPL CALCULATED.3IONS-SCNC: 8 MMOL/L (ref 5–15)
AQP4 H2O CHANNEL IGG SERPL IA-ACNC: <1.5 U/ML (ref 0–3)
AST SERPL-CCNC: 15 U/L (ref 1–32)
BASOPHILS # BLD AUTO: 0.04 10*3/MM3 (ref 0–0.2)
BASOPHILS NFR BLD AUTO: 0.5 % (ref 0–1.5)
BILIRUB SERPL-MCNC: 0.3 MG/DL (ref 0–1.2)
BUN SERPL-MCNC: 15 MG/DL (ref 8–23)
BUN/CREAT SERPL: 23.4 (ref 7–25)
CALCIUM SPEC-SCNC: 9.8 MG/DL (ref 8.6–10.5)
CHLORIDE SERPL-SCNC: 104 MMOL/L (ref 98–107)
CO2 SERPL-SCNC: 30 MMOL/L (ref 22–29)
CREAT SERPL-MCNC: 0.64 MG/DL (ref 0.57–1)
DEPRECATED RDW RBC AUTO: 44.1 FL (ref 37–54)
EGFRCR SERPLBLD CKD-EPI 2021: 97 ML/MIN/1.73
EOSINOPHIL # BLD AUTO: 0.12 10*3/MM3 (ref 0–0.4)
EOSINOPHIL NFR BLD AUTO: 1.6 % (ref 0.3–6.2)
ERYTHROCYTE [DISTWIDTH] IN BLOOD BY AUTOMATED COUNT: 14.5 % (ref 12.3–15.4)
GLOBULIN UR ELPH-MCNC: 2.7 GM/DL
GLUCOSE BLDC GLUCOMTR-MCNC: 166 MG/DL (ref 70–130)
GLUCOSE BLDC GLUCOMTR-MCNC: 184 MG/DL (ref 70–130)
GLUCOSE SERPL-MCNC: 188 MG/DL (ref 65–99)
HCT VFR BLD AUTO: 39.6 % (ref 34–46.6)
HGB BLD-MCNC: 12.7 G/DL (ref 12–15.9)
IMM GRANULOCYTES # BLD AUTO: 0.01 10*3/MM3 (ref 0–0.05)
IMM GRANULOCYTES NFR BLD AUTO: 0.1 % (ref 0–0.5)
LYMPHOCYTES # BLD AUTO: 2.62 10*3/MM3 (ref 0.7–3.1)
LYMPHOCYTES NFR BLD AUTO: 35.6 % (ref 19.6–45.3)
MCH RBC QN AUTO: 26.8 PG (ref 26.6–33)
MCHC RBC AUTO-ENTMCNC: 32.1 G/DL (ref 31.5–35.7)
MCV RBC AUTO: 83.7 FL (ref 79–97)
MONOCYTES # BLD AUTO: 0.57 10*3/MM3 (ref 0.1–0.9)
MONOCYTES NFR BLD AUTO: 7.7 % (ref 5–12)
NEUTROPHILS NFR BLD AUTO: 4 10*3/MM3 (ref 1.7–7)
NEUTROPHILS NFR BLD AUTO: 54.5 % (ref 42.7–76)
NRBC BLD AUTO-RTO: 0 /100 WBC (ref 0–0.2)
PLATELET # BLD AUTO: 165 10*3/MM3 (ref 140–450)
PMV BLD AUTO: 10.1 FL (ref 6–12)
POTASSIUM SERPL-SCNC: 4.1 MMOL/L (ref 3.5–5.2)
PROT SERPL-MCNC: 7 G/DL (ref 6–8.5)
RBC # BLD AUTO: 4.73 10*6/MM3 (ref 3.77–5.28)
SODIUM SERPL-SCNC: 142 MMOL/L (ref 136–145)
WBC NRBC COR # BLD: 7.36 10*3/MM3 (ref 3.4–10.8)

## 2023-10-20 PROCEDURE — 82948 REAGENT STRIP/BLOOD GLUCOSE: CPT

## 2023-10-20 PROCEDURE — 97110 THERAPEUTIC EXERCISES: CPT

## 2023-10-20 PROCEDURE — 80053 COMPREHEN METABOLIC PANEL: CPT | Performed by: STUDENT IN AN ORGANIZED HEALTH CARE EDUCATION/TRAINING PROGRAM

## 2023-10-20 PROCEDURE — 97116 GAIT TRAINING THERAPY: CPT

## 2023-10-20 PROCEDURE — G0378 HOSPITAL OBSERVATION PER HR: HCPCS

## 2023-10-20 PROCEDURE — 85025 COMPLETE CBC W/AUTO DIFF WBC: CPT | Performed by: STUDENT IN AN ORGANIZED HEALTH CARE EDUCATION/TRAINING PROGRAM

## 2023-10-20 PROCEDURE — 63710000001 INSULIN LISPRO (HUMAN) PER 5 UNITS: Performed by: STUDENT IN AN ORGANIZED HEALTH CARE EDUCATION/TRAINING PROGRAM

## 2023-10-20 RX ORDER — ATORVASTATIN CALCIUM 80 MG/1
80 TABLET, FILM COATED ORAL NIGHTLY
Qty: 30 TABLET | Refills: 0 | Status: SHIPPED | OUTPATIENT
Start: 2023-10-20

## 2023-10-20 RX ORDER — GABAPENTIN 300 MG/1
300 CAPSULE ORAL 3 TIMES DAILY
Qty: 90 CAPSULE | Refills: 1 | Status: SHIPPED | OUTPATIENT
Start: 2023-10-20

## 2023-10-20 RX ADMIN — INSULIN LISPRO 3 UNITS: 100 INJECTION, SOLUTION INTRAVENOUS; SUBCUTANEOUS at 07:57

## 2023-10-20 RX ADMIN — INSULIN LISPRO 3 UNITS: 100 INJECTION, SOLUTION INTRAVENOUS; SUBCUTANEOUS at 11:45

## 2023-10-20 RX ADMIN — LEVOTHYROXINE SODIUM 175 MCG: 150 TABLET ORAL at 05:00

## 2023-10-20 RX ADMIN — DOCUSATE SODIUM 50 MG AND SENNOSIDES 8.6 MG 2 TABLET: 8.6; 5 TABLET, FILM COATED ORAL at 08:17

## 2023-10-20 RX ADMIN — GABAPENTIN 300 MG: 300 CAPSULE ORAL at 15:45

## 2023-10-20 RX ADMIN — Medication 5000 UNITS: at 08:16

## 2023-10-20 RX ADMIN — Medication 27 MG: at 08:16

## 2023-10-20 RX ADMIN — Medication 10 ML: at 08:18

## 2023-10-20 RX ADMIN — SERTRALINE HYDROCHLORIDE 100 MG: 100 TABLET, FILM COATED ORAL at 08:16

## 2023-10-20 RX ADMIN — Medication 1000 MCG: at 08:17

## 2023-10-20 RX ADMIN — PANCRELIPASE 12000 UNITS OF LIPASE: 60000; 12000; 38000 CAPSULE, DELAYED RELEASE PELLETS ORAL at 11:40

## 2023-10-20 RX ADMIN — Medication 1 CAPSULE: at 08:16

## 2023-10-20 RX ADMIN — PANCRELIPASE 12000 UNITS OF LIPASE: 60000; 12000; 38000 CAPSULE, DELAYED RELEASE PELLETS ORAL at 08:17

## 2023-10-20 RX ADMIN — GABAPENTIN 300 MG: 300 CAPSULE ORAL at 08:16

## 2023-10-20 NOTE — CASE MANAGEMENT/SOCIAL WORK
Continued Stay Note  [facility]     Patient Name: Charlene Bey  MRN: [id number]  Today's Date: 10/20/2023    Admit Date: 10/16/2023        Discharge Plan       Row Name 10/20/23 1138       Plan    Final Discharge Disposition Code 01 - home or self-care    Final Note Pt has decided to dc home instead of going to [facility]. Ordered rolling walker from [employer] and it will be delivered to pt room. Pt states she plans on doing outpt instead of HH. No other dc needs per pt.                   Discharge Codes    No documentation.                       FARHAN Chaudhry

## 2023-10-20 NOTE — PLAN OF CARE
Goal Outcome Evaluation:  Plan of Care Reviewed With: (P) patient        Progress: (P) improving  Outcome Evaluation: (P) Pt with no complaints. Pt up in room. Transfer supervision. Pt amb 100' with fww, right side deviations. Balance and gait training activities in standing. Pt with some LOB in standing, but able to correct without help from PTA.

## 2023-10-20 NOTE — DISCHARGE SUMMARY
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 Lynn, neurology    Procedures Performed: None    Pertinent Test Results:       Imaging Results (All)       Procedure Component Value Units Date/Time    MRI Brain With & Without Contrast [[id number]] Collected: 10/17/23 1847     Updated: 10/17/23 1856    Narrative:      EXAM/TECHNIQUE: MRI brain without and with IV contrast     INDICATION: RLE weakness and numbness; R26.9-Unspecified abnormalities  of gait and mobility     COMPARISON: None     FINDINGS:     No diffusion restriction to suggest acute infarction. No increased  susceptibility to suggest acute or chronic intracranial hemorrhage. The  major physiologic flow voids are maintained.     No abnormality of brain parenchyma signal intensity. No midline shift or  mass effect. Lateral ventricles are nondilated. Basilar cisterns are  patent. The sella turcica and its contents appear unremarkable.  Following contrast administration, no abnormal foci of enhancement are  identified.     No acute orbital finding. Small left mastoid effusion. Paranasal sinuses  are clear.       Impression:         No acute intracranial findings.     This report was signed and finalized on 10/17/2023 6:52 PM CDT by Dr. Luis Chawla MD.       MRI Thoracic Spine With & Without Contrast [[id number]] Collected: 10/16/23 2058     Updated: 10/16/23 2107    Narrative:       EXAM/TECHNIQUE: MRI of thoracic spine without and with IV contrast     INDICATION: Right-sided abdominal paresthesias, right lower extremity  weakness and paresthesia     COMPARISON: None     FINDINGS:     Thoracic kyphosis and alignment are maintained. Vertebral bodies are of  normal height and signal intensity. No significant vertebral body marrow  edema. Mild diffuse disc desiccation. Mild multilevel thoracic spine  degenerative change with small endplate osteophytes and mild facet  arthropathy. No area of high-grade central canal or neural foraminal  stenosis. Multiple small disc bulges are present throughout the thoracic  spine. No signal in the thoracic cord which can be corroborated on more  than one plane of imaging. Paravertebral soft tissues are unremarkable.  Large hiatal hernia. Following contrast administration, no abnormal foci  of enhancement are identified.          Impression:      1.  No acute findings.  2.  Mild multilevel degenerative change.   3.  Large hiatal hernia.        This report was signed and finalized on 10/16/2023 9:04 PM CDT by Dr. Luis Chawla MD.       MRI Lumbar Spine With & Without Contrast [[id number]] Collected: 10/16/23 1950     Updated: 10/16/23 2000    Narrative:      EXAM/TECHNIQUE: MRI lumbar spine without and with IV contrast     INDICATION: Right-sided paresthesias, right lower extremity weakness     COMPARISON: None     FINDINGS:     This report assumes 5 lumbar type vertebral bodies. 8 mm anterolisthesis  of L4 on L5. No other subluxation. Lumbar spine alignment is maintained.  No significant vertebral body marrow edema. No vertebral body height  loss. Mild multilevel mixed Modic type degenerative endplate changes  greatest at L4-L5 and L5-S1. Discs are moderately desiccated throughout.  Moderate multilevel lumbar spine degenerative changes described in  further detail level by level below. The distal cord/conus appears  normal. Prevertebral soft tissues are  unremarkable. Following contrast  administration, no abnormal foci of enhancement are identified. No  epidural collections. No paraspinal collection     Multilevel degenerative change:  L1-L2: Disc bulge flattens the ventral thecal sac mildly narrowing the  central canal. Facet arthropathy mildly narrows both neural foramina.     L2-L3: Disc bulge flattens the ventral thecal sac. Along with facet  arthropathy, there is mild central canal narrowing and mild bilateral  neural foraminal narrowing.     L3-L4: Small disc bulge and facet arthropathy causes mild to moderate  central canal stenosis and mild bilateral neural foraminal stenosis.     L4-L5: Small disc bulge and facet arthropathy causes mild central canal  stenosis and mild left neural foraminal stenosis. There is moderate  right neural foraminal stenosis with disc/osteophyte material closely  approximating and likely abutting the exiting right L4 nerve root.     L5-S1: Central canal is widely patent. Anterolisthesis and disc bulge  causes mild left neural foraminal stenosis and moderate right neural  foraminal stenosis. Disc/osteophyte material closely approximates and  likely abuts the exiting right L5 nerve root.       Impression:         1.  No acute osseous findings.  2.  8 mm anterolisthesis of L5 on S1.  3.  Moderate multilevel degenerative change, as described above. No area  of severe central canal stenosis. There is moderate right sided neural  foraminal stenosis at L4-L5 and L5-S1 with disc/osteophyte material  closely approximating and likely abutting the exiting right L4 and L5  nerve roots.        This report was signed and finalized on 10/16/2023 7:56 PM CDT by Dr. Luis Chawla MD.             LAB RESULTS:      Lab 10/20/23  0404 10/19/23  0510 10/18/23  0350 10/17/23  0445 10/16/23  1551   WBC 7.36 7.78 8.07 8.26 8.11   HEMOGLOBIN 12.7 13.0 12.9 12.1 12.5   HEMATOCRIT 39.6 40.3 39.9 38.1 38.6   PLATELETS 165 174 187 161 186   NEUTROS ABS  4.00 4.29 4.38 4.17 5.17   IMMATURE GRANS (ABS) 0.01 0.03 0.02 0.02 0.02   LYMPHS ABS 2.62 2.68 2.97 3.42* 2.53   MONOS ABS 0.57 0.60 0.56 0.52 0.34   EOS ABS 0.12 0.14 0.11 0.10 0.04   MCV 83.7 83.4 82.6 84.1 84.3   SED RATE  --   --   --   --  6   CRP  --   --   --   --  <0.30         Lab 10/20/23  0404 10/19/23  0510 10/18/23  0350 10/17/23  0445 10/16/23  1551   SODIUM 142 140 139 140 139   POTASSIUM 4.1 4.1 3.9 4.0 3.9   CHLORIDE 104 104 103 105 104   CO2 30.0* 26.0 27.0 28.0 27.0   ANION GAP 8.0 10.0 9.0 7.0 8.0   BUN 15 16 15 14 16   CREATININE 0.64 0.61 0.65 0.76 0.75   EGFR 97.0 98.1 96.6 86.0 87.4   GLUCOSE 188* 199* 189* 153* 195*   CALCIUM 9.8 9.5 9.8 9.1 9.2   MAGNESIUM  --   --   --   --  1.9   PHOSPHORUS  --   --   --   --  2.7   HEMOGLOBIN A1C  --   --   --   --  7.80*   TSH  --   --   --   --  4.370*         Lab 10/20/23  0404 10/19/23  0510 10/18/23  0350 10/17/23  0445 10/16/23  1551   TOTAL PROTEIN 7.0 6.9 7.3 6.7 7.1   ALBUMIN 4.3 4.3 4.4 4.2 4.5   GLOBULIN 2.7 2.6 2.9 2.5 2.6   ALT (SGPT) 15 16 15 14 16   AST (SGOT) 15 15 15 13 15   BILIRUBIN 0.3 0.2 0.2 0.2 0.2   ALK PHOS 72 72 71 58 63             Lab 10/16/23  1551   CHOLESTEROL 180   LDL CHOL 124*   HDL CHOL 34*   TRIGLYCERIDES 123         Lab 10/16/23  1551   FOLATE 17.30   VITAMIN B 12 571         Brief Urine Lab Results  (Last result in the past 365 days)        Color   Clarity   Blood   Leuk Est   Nitrite   Protein   CREAT   Urine HCG        08/22/23 2300             42.4               Microbiology Results (last 10 days)       Procedure Component Value - Date/Time    COVID PRE-OP / PRE-PROCEDURE SCREENING ORDER (NO ISOLATION) - Swab, Nasopharynx [[id number]]  (Normal) Collected: 10/16/23 1552    Lab Status: Final result Specimen: Swab from Nasopharynx Updated: 10/16/23 1650    Narrative:      The following orders were created for panel order COVID PRE-OP / PRE-PROCEDURE SCREENING ORDER (NO ISOLATION) - Swab, Nasopharynx.  Procedure                                Abnormality         Status                     ---------                               -----------         ------                     COVID-19,CEPHEID/RAVINDER,CO...[[id number]]  Normal              Final result                 Please view results for these tests on the individual orders.    COVID-19,CEPHEID/RAVINDER,COR/CARMELITA/PAD/YOANNA/LAG/MAD IN-HOUSE(OR EMERGENT/ADD-ON),NP SWAB IN TRANSPORT MEDIA 3-4 HR TAT, RT-PCR - Swab, Nasopharynx [[id number]]  (Normal) Collected: 10/16/23 1552    Lab Status: Final result Specimen: Swab from Nasopharynx Updated: 10/16/23 1650     COVID19 Not Detected    Narrative:      Fact sheet for providers: https://www.fda.gov/media/850198/download     Fact sheet for patients: https://www.fda.gov/media/953410/download  Fact sheet for providers: https://www.fda.gov/media/477727/download    Fact sheet for patients: https://www.fda.gov/media/902109/download    Test performed by PCR.          Hospital Course: Ms. Bey is a 67-year-old female who presented to [facility] on 10/16 for right lower extremity paresthesia.  Patient was COVID-positive on 10/11/2023.  She started Paxlovid and had improvement in symptoms.  On 10/16, she awoke in her usual state of health when she noted sudden numbness to right foot.  Numbness progressed to the entire right side to upper abdomen. She also noted right lower extremity weakness and had difficulty raising right leg. She also notes pain that begins in abdomen and radiates to her back similar to prior pancreatitis attacks.  She originally presented to [facility] emergency department. CT head was negative, CT abd/pelvis showed large hiatal hernia, moderate amount of stool, fat containing umbilical and supra umbilical hernia, left and right ingquinal hernias. amylase/lipase WNL. TSH 9.92. Dr. JOSE Overton discussed case with Dr. ELLE Lynn and patient accepted for transfer.       She was admitted to the neurology floor with paresthesia  right lower extremity. MRI shows no acute findings.  MRI spine showed moderate multilevel degenerative changes and 8 mm anterolisthesis of L5 on S1.  MRI no acute findings.  Neurology consulted and Dr. Lynn suspects post-COVID plexitis.  Patient declined lumbar puncture.  Patient started on gabapentin 300 mg 3 times daily per neurology.  Patient reported right lower extremity paresthesia with dullness lower extremity below knee and sharp sensation right upper thigh.  Physical therapy and Occupational Therapy consulted and patient needed cues for gait training and sequencing due to decreased balance and tends to fall toward gait.  Gait appears right lower extremity to overshoot right foot placement.  Patient continued to work with physical therapy throughout hospitalization and by date of discharge patient was able to ambulate 100 feet with four-wheel rolling walker, right side deviations.  Balance and gait training activities in standing.  Patient with some loss of balance and leaning but able to correct without help of physical therapy.  Initially, patient desired inpatient rehab but referral made and decision took several days.  On 10/20/2023, patient felt as though she had improved significantly with the assistance of physical therapy and desired to go home with outpatient physical therapy.  Walker ordered.    Hyperlipidemia with .  Atorvastatin 80 mg orally nightly continued.    Diabetes mellitus type 2 with hemoglobin A1c 7.8.  Accu-Cheks with sliding scale insulin coverage ordered.  Detemir 10 units nightly ordered.  Glipizide held on admission.  Glucoses 166, 186.  Home dose of insulin and glipizide resumed at discharge.    Synthroid continued for hypothyroidism.  TSH 4.37 with free T4 0.96.    Pancrelipase continued for chronic pancreatitis.    Social service consulted for discharge planning.  Referral made to [facility].  Waited on physician to make decision for approval.  Patient continue to  "work with physical therapy with improvement of symptoms and elected for discharge home with outpatient physical therapy and Occupational Therapy on 10/20/2023.    On 10/20/2023, patient desires discharge home with outpatient physical therapy and Occupational Therapy.  Patient will follow-up with Dr.Jonathan Saravia on 10/20/2023 and follow-up with neurology on 12/8/2023.  Gabapentin continued per neurology at discharge.    Physical Exam on Discharge:  /52 (BP Location: Left arm, Patient Position: Lying)   Pulse 53   Temp 97.8 °F (36.6 °C) (Oral)   Resp 18   Ht 170.2 cm (67\")   Wt 87.5 kg (193 lb)   SpO2 96%   BMI 30.23 kg/m²   Physical Exam  Vitals and nursing note reviewed.   Constitutional:       Comments: Sitting up in bed.  No oxygen in use.  No visitors in room.   HENT:      Head: Normocephalic and atraumatic.      Nose: No congestion.      Mouth/Throat:      Pharynx: Oropharynx is clear. No oropharyngeal exudate or posterior oropharyngeal erythema.   Eyes:      Extraocular Movements: Extraocular movements intact.      Pupils: Pupils are equal, round, and reactive to light.   Cardiovascular:      Rate and Rhythm: Normal rate and regular rhythm.      Heart sounds: No murmur heard.     Comments: Normal sinus rhythm 53 on telemetry.  Pulmonary:      Breath sounds: No wheezing, rhonchi or rales.      Comments: No oxygen in use.  Abdominal:      Palpations: Abdomen is soft.      Tenderness: There is no abdominal tenderness.   Genitourinary:     Comments: Voiding.  Musculoskeletal:         General: No swelling or tenderness.      Cervical back: Normal range of motion and neck supple.   Skin:     General: Skin is warm and dry.   Neurological:      Mental Status: She is oriented to person, place, and time.      Comments: Numbness right lower extremity improved but starting to decrease up in abdomen area and upper thigh.  Reports improved sensation   Psychiatric:         Mood and Affect: Mood normal.         " Behavior: Behavior normal.         Thought Content: Thought content normal.         Judgment: Judgment normal.       Condition on Discharge: Stable for discharge home with physical therapy and Occupational Therapy    Discharge Disposition:  Home or Self Care    Discharge Medications:     Discharge Medications        New Medications        Instructions Start Date   atorvastatin 80 MG tablet  Commonly known as: LIPITOR   80 mg, Oral, Nightly      gabapentin 300 MG capsule  Commonly known as: NEURONTIN   300 mg, Oral, 3 Times Daily             Changes to Medications        Instructions Start Date   Levemir FlexPen 100 UNIT/ML injection  Generic drug: insulin detemir  What changed: when to take this   8 Units, Subcutaneous, Daily             Continue These Medications        Instructions Start Date   B-12 1000 MCG tablet   1 tablet, Oral, Daily      CALCIUM-MAGNESIUM-ZINC PO   1 tablet, Oral, Daily      CoQ10 100 MG capsule   300 mg, Oral, Daily      estradiol 0.1 MG/GM vaginal cream  Commonly known as: ESTRACE   2 g, Vaginal, Daily PRN, 2-3 times wk      fish oil 1000 MG capsule capsule   1,000 mg, Oral, Daily With Breakfast      glipizide 10 MG 24 hr tablet  Commonly known as: GLUCOTROL XL   10 mg, Oral, Daily      glucosamine-chondroitin 500-400 MG capsule capsule   1 capsule, Oral, 3 Times Daily With Meals      hydrOXYzine 25 MG tablet  Commonly known as: ATARAX   25 mg, Oral, Daily PRN      levothyroxine 175 MCG tablet  Commonly known as: SYNTHROID, LEVOTHROID   175 mcg, Oral, Every Early Morning, Monday, Wednesday and Friday      levothyroxine 150 MCG tablet  Commonly known as: SYNTHROID, LEVOTHROID   150 mcg, Oral, Daily, Am, tues/thurs/sa/land      magnesium gluconate 500 MG tablet  Commonly known as: MAGONATE   27 mg, Oral, 2 Times Daily      methocarbamol 500 MG tablet  Commonly known as: ROBAXIN   500 mg, Oral, Daily PRN      montelukast 10 MG tablet  Commonly known as: SINGULAIR   1 tablet, Oral, Daily       neomycin-bacitracin-polymyxin-hydrocortisone 1 % ophthalmic ointment  Commonly known as: CORTISPORIN   1 application , Both Eyes, 2 Times Daily      ondansetron 4 MG tablet  Commonly known as: ZOFRAN   4 mg, Oral, Every 8 Hours PRN      Pancrelipase (Lip-Prot-Amyl) 52979-803772 units capsule delayed-release particles capsule  Commonly known as: CREON   36,000 units of lipase, Oral, 3 Times Daily With Meals      Risaquad-2 capsule capsule   1 capsule, Oral, Daily      rizatriptan 10 MG tablet  Commonly known as: MAXALT   10 mg, Oral, Once As Needed      sertraline 100 MG tablet  Commonly known as: ZOLOFT   100 mg, Oral, Daily      vitamin D3 125 MCG (5000 UT) capsule capsule   5,000 Units, Oral, Daily             Discharge Diet:   Diet Instructions       Diet: Diabetic Diets; Consistent Carbohydrate; Regular Texture (IDDSI 7); Thin (IDDSI 0)      Discharge Diet: Diabetic Diets    Diabetic Diet: Consistent Carbohydrate    Texture: Regular Texture (IDDSI 7)    Fluid Consistency: Thin (IDDSI 0)          Activity at Discharge:   Activity Instructions       Activity as Tolerated            Discharge instructions:  1.  For worsening right leg numbness seek medical attention.  2.  Gabapentin 3 times daily per Dr. Lynn.  3.  Outpatient physical therapy  4.  Follow-up with GREGORIO Howe 1 week  5.  Follow-up with neurology 4 weeks    Follow-up Appointments:   Future Appointments   Date Time Provider Department Center   10/25/2023  2:00 PM Alex Saravia APRN [facility]   11/22/2023  9:30 AM Alex Saravia APRN [facility]   12/8/2023  1:00 PM Jacob Espinoza APRN [facility]     Test Results Pending at Discharge: None    Electronically signed by GREGORIO Leonard, 10/20/23, 12:15 CDT.    Time: 35 minutes.  Discussed with Dr. Avery, Dora Fernandes, GREGORIO neurology, and patient.    The above documentation resulted from a face-to-face encounter by me Carito PERKINS, [facility].

## 2023-10-20 NOTE — PLAN OF CARE
Goal Outcome Evaluation:   Pt alert and oriented x4. VSS. No c/o pain. TAPIA. PPP. SCDS for VTE. . Tolerating cons carb diet. Skin intact. Voiding via toilet. Last BM today. BS monitoring AC/HS. No D/c plans today. Call light within reach. Safety maintained.

## 2023-10-20 NOTE — THERAPY TREATMENT NOTE
Acute Care - Physical Therapy Treatment Note  [facility]     Patient Name: Charlene Bey  : 1955  MRN: [id number]  Today's Date: 10/20/2023      Visit Dx:     ICD-10-CM ICD-9-CM   1. 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    Type 2 diabetes mellitus with hyperglycemia    Hypothyroidism (acquired)    Chronic pancreatitis    Nonintractable migraine    Dizziness, nonspecific     Past Medical History:   Diagnosis Date    Cystocele with rectocele     Diabetes mellitus     Goiter     Hashimoto's thyroiditis     Headache     Hypothyroidism     Mumps pancreatitis     Pancreatitis     Peptic ulceration     Thyroid nodule      Past Surgical History:   Procedure Laterality Date    BLADDER SUSPENSION      CHOLECYSTECTOMY      COLOSTOMY      CYSTOCELE REPAIR      HERNIA REPAIR      HYSTERECTOMY      OVARIAN CYST REMOVAL      RECTOCELE REPAIR      SMALL INTESTINE SURGERY      TONSILLECTOMY       PT Assessment (last 12 hours)       PT Evaluation and Treatment       Row Name 10/20/23 1056          Physical Therapy Time and Intention    Subjective Information no complaints (P)   -     Document Type therapy note (daily note) (P)   -MOLINA     Mode of Treatment physical therapy (P)   -       Row Name 10/20/23 1056          General Information    Existing Precautions/Restrictions fall (P)   -       Row Name 10/20/23 1056          Pain    Pretreatment Pain Rating 0/10 - no pain (P)   -MOLINA     Posttreatment Pain Rating 0/10 - no pain (P)   -       Row Name 10/20/23 1056          Bed Mobility    Comment, (Bed Mobility) Pt up in room. (P)   -       Row Name 10/20/23 1056          Transfers    Transfers stand-sit transfer (P)   -       Row Name 10/20/23 1056          Stand-Sit Transfer    Stand-Sit Ary (Transfers) supervision (P)   -     Assistive Device (Stand-Sit Transfers) walker, front-wheeled (P)    -MOLINA       Row Name 10/20/23 1056          Gait/Stairs (Locomotion)    [place] Level (Gait) verbal cues;minimum assist (75% patient effort) (P)   -MOLINA     Assistive Device (Gait) walker, front-wheeled (P)   -MOLINA     Distance in Feet (Gait) 100' (P)   x 3  -MOLINA     Deviations/Abnormal Patterns (Gait) right sided deviations;nalini decreased;gait speed decreased;stride length decreased;weight shifting decreased;steppage (P)   -MOLINA     Bilateral Gait Deviations heel strike decreased (P)   -MOLINA     Right Sided Gait Deviations decreased knee extension;forward flexed posture;heel strike decreased;weight shift ability decreased (P)   -MOLINA       Row Name 10/20/23 1056          Plan of Care Review    Plan of Care Reviewed With patient (P)   -MOLINA     Progress improving (P)   -MOLINA     Outcome Evaluation Pt with no complaints. Pt up in room. Transfer supervision. Pt amb 100' with fww, right side deviations. Balance and gait training activities in standing. Pt with some LOB in standing, but able to correct without help from PTA. (P)   -MOLINA               User Key  (r) = Recorded By, (t) = Taken By, (c) = Cosigned By      Initials Name Provider Type    Ed Capone, PTA Student PTA Student                    Physical Therapy Education       Title: PT OT SLP Therapies (Done)       Topic: Physical Therapy (Done)       Point: Mobility training (Done)       Learning Progress Summary             Patient Acceptance, E,DAVIDA WYNNE, LILIBETH,CARMEN by MOLINA at 10/20/2023 1145    Acceptance, E, VU by MADDIE at 10/19/2023 1902    Acceptance, E,DAVIDA WYNNE VU, DU,NR by GLENNA at 10/17/2023 1014    Comment: education re: purpose of PT/importance of activity, for safety/falls prevention, proper use of rwx w/ emphasis on gait sequencing, placement of rwx, hand placement w/ transitions, T/I AA HS and quad sets w/ return demo                         Point: Home exercise program (Done)       Learning Progress Summary             Patient Acceptance, E,DAVIDA WYNNE VU,CARMEN by MOLINA at  10/20/2023 1145    Acceptance, E, VU by MADDIE at 10/19/2023 1902    Acceptance, E,TB,D, VU,DU,NR by GLENNA at 10/17/2023 1014    Comment: education re: purpose of PT/importance of activity, for safety/falls prevention, proper use of rwx w/ emphasis on gait sequencing, placement of rwx, hand placement w/ transitions, T/I AA HS and quad sets w/ return demo                         Point: Precautions (Done)       Learning Progress Summary             Patient Acceptance, E,TB,D, VU,DU by MOLINA at 10/20/2023 1145    Acceptance, E, VU by MADDIE at 10/19/2023 1902    Acceptance, E,TB,D, VU,DU,NR by GLENNA at 10/17/2023 1014    Comment: education re: purpose of PT/importance of activity, for safety/falls prevention, proper use of rwx w/ emphasis on gait sequencing, placement of rwx, hand placement w/ transitions, T/I AA HS and quad sets w/ return demo                                         User Key       Initials Effective Dates Name Provider Type Discipline     08/02/18 -  Tiffani York, PT Physical Therapist PT     05/22/23 -  Khushbu Mi, RN Registered Nurse Nurse     09/26/23 -  Ed Chau, PTA Student PTA Student PT                  PT Recommendation and Plan     Plan of Care Reviewed With: (P) patient  Progress: (P) improving  Outcome Evaluation: (P) Pt with no complaints. Pt up in room. Transfer supervision. Pt amb 100' with fww, right side deviations. Balance and gait training activities in standing. Pt with some LOB in standing, but able to correct without help from PTA.   Outcome Measures       Row Name 10/20/23 1100 10/19/23 1300 10/19/23 1000       How much help from another person do you currently need...    Turning from your back to your side while in flat bed without using bedrails? 4 (P)   -MOLINA -- 4  -KJ    Moving from lying on back to sitting on the side of a flat bed without bedrails? 4 (P)   -MOLINA -- 4  -KJ    Moving to and from a bed to a chair (including a wheelchair)? 3 (P)   -MOLINA -- 3  -KJ    Standing up  from a chair using your arms (e.g., wheelchair, bedside chair)? 3 (P)   -MOLINA -- 3  -KJ    Climbing 3-5 steps with a railing? 2 (P)   -MOLINA -- 2  -KJ    To walk in hospital room? 4 (P)   -MOLINA -- 3  -KJ    AM-PAC 6 Clicks Score (PT) 20 (P)   -MOLINA -- 19  -KJ    Highest level of mobility 6 --> Walked 10 steps or more (P)   -MOLINA -- 6 --> Walked 10 steps or more  -KJ       How much help from another is currently needed...    Putting on and taking off regular lower body clothing? -- 3  -EC --    Bathing (including washing, rinsing, and drying) -- 3  -EC --    Toileting (which includes using toilet bed pan or urinal) -- 3  -EC --    Putting on and taking off regular upper body clothing -- 4  -EC --    Taking care of personal grooming (such as brushing teeth) -- 4  -EC --    Eating meals -- 4  -EC --    AM-[facility] 6 Clicks Score (OT) -- 21  -EC --       Functional Assessment    Outcome Measure Options AM-[facility] 6 Clicks Basic Mobility (PT) (P)   -MOLINA AM-[facility] 6 Clicks Daily Activity (OT)  -EC AM-[facility] 6 Clicks Basic Mobility (PT)  -KJ      Row Name 10/18/23 0900             How much help from another person do you currently need...    Turning from your back to your side while in flat bed without using bedrails? 4  -KJ      Moving from lying on back to sitting on the side of a flat bed without bedrails? 4  -KJ      Moving to and from a bed to a chair (including a wheelchair)? 3  -KJ      Standing up from a chair using your arms (e.g., wheelchair, bedside chair)? 3  -KJ      Climbing 3-5 steps with a railing? 3  -KJ      To walk in hospital room? 3  -KJ      AM-[facility] 6 Clicks Score (PT) 20  -KJ      Highest level of mobility 6 --> Walked 10 steps or more  -KJ         Functional Assessment    Outcome Measure Options AM-[facility] 6 Clicks Basic Mobility (PT)  -KJ                User Key  (r) = Recorded By, (t) = Taken By, (c) = Cosigned By      Initials Name Provider Type    Bri Hoffman, PTA Physical Therapist Assistant    EC Loraine Coreas, OTR/L  Occupational Therapist    Ed Capone, PTA Student PTA Student                     Time Calculation:    PT Charges       Row Name 10/20/23 1145             Time Calculation    Start Time 1056 (P)   -MOLINA      Stop Time 1133 (P)   -MOLINA      Time Calculation (min) 37 min (P)   -MOLINA      PT Received On 10/20/23 (P)   -MOLINA      PT Goal Re-Cert Due Date 10/27/23 (P)   -MOLINA         Time Calculation- PT    Total Timed Code Minutes- PT 37 minute(s) (P)   -MOLINA                User Key  (r) = Recorded By, (t) = Taken By, (c) = Cosigned By      Initials Name Provider Type    Ed Capone, PTA Student PTA Student                      PT G-Codes  Outcome Measure Options: (P) AM-PAC 6 Clicks Basic Mobility (PT)  AM-PAC 6 Clicks Score (PT): (P) 20  AM-PAC 6 Clicks Score (OT): 21    Ed Chau PTA Student  10/20/2023

## 2023-10-20 NOTE — PLAN OF CARE
Goal Outcome Evaluation:      Pt alert and oriented x4. VSS. No c/o pain. TAPIA. PPP. SCDS for VTE. . Tolerating Cons carb diet. Skin intact. Voiding via toilet. Last BM 10-. BS monitoring AC/HS. D/c plans today. Call light within reach. Safety maintained.

## 2023-10-20 NOTE — OUTREACH NOTE
Prep Survey      Flowsheet Row Responses   [facility] patient discharged from? [place]   Is LACE score < 7 ? 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 Other   Does the patient have Home health ordered? No   Is there a DME ordered? Yes   What DME was ordered? medicare-rlling walker   Prep survey completed? Yes            SHRADDHA CALLAHAN - Registered Nurse

## 2023-10-20 NOTE — PLAN OF CARE
Goal Outcome Evaluation:  Plan of Care Reviewed With: patient        Progress: improving  Outcome Evaluation: VSS. Pt is A&Ox4. No changes in NVs, n/t to R lower extremity. Up x1 w/ walker. No c/o pain or discomfort noted throughout shift. Tele and SCDs in place. Resting comfortably. Safety preacautions mantained.

## 2023-10-22 NOTE — THERAPY DISCHARGE NOTE
Acute Care - Physical Therapy Discharge Summary  [facility]       Patient Name: Charlene Bey  : 1955  MRN: [id number]    Today's Date: 10/22/2023                 Admit Date: 10/16/2023      PT Recommendation and Plan    Visit Dx:    ICD-10-CM ICD-9-CM   1. Gait abnormality [R26.9]  R26.9 781.2   2. 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    Turning from your back to your side while in flat bed without using bedrails? 4  -MS (r) MOLINA (t) MS (c) -- 4  -KJ    Moving from lying on back to sitting on the side of a flat bed without bedrails? 4  -MS (r) MOLINA (t) MS (c) -- 4  -KJ    Moving to and from a bed to a chair (including a wheelchair)? 3  -MS (r) MOLINA (t) MS (c) -- 3  -KJ    Standing up from a chair using your arms (e.g., wheelchair, bedside chair)? 3  -MS (r) MOLINA (t) MS (c) -- 3  -KJ    Climbing 3-5 steps with a railing? 2  -MS (r) MOLINA (t) MS (c) -- 2  -KJ    To walk in hospital room? 4  -MS (r) MOLINA (t) MS (c) -- 3  -KJ    AM-PAC 6 Clicks Score (PT) 20  -MS (r) MOLINA (t) -- 19  -KJ    Highest level of mobility 6 --> Walked 10 steps or more  -MS (r) MOLINA (t) -- 6 --> Walked 10 steps or more  -KJ       How much help from another is currently needed...    Putting on and taking off regular lower body clothing? -- 3  -EC --    Bathing (including washing, rinsing, and drying) -- 3  -EC --    Toileting (which includes using toilet bed pan or urinal) -- 3  -EC --    Putting on and taking off regular upper body clothing -- 4  -EC --    Taking care of personal grooming (such as brushing teeth) -- 4  -EC --    Eating meals -- 4  -EC --    AM-PAC 6 Clicks Score (OT) -- 21  -EC --       Functional Assessment    Outcome Measure Options AM-PAC 6 Clicks Basic Mobility (PT)  -MS (r) MOLINA (t) MS (c) AM-PAC 6 Clicks Daily Activity (OT)  -EC  AM-PAC 6 Clicks Basic Mobility (PT)  -CORONA              User Key  (r) = Recorded By, (t) = Taken By, (c) = Cosigned By      Initials Name Provider Type    Bri Hoffman, PTA Physical Therapist Assistant    Ariadna Sellers R, PT, DPT, NCS Physical Therapist    Loraine Womack, OTR/L Occupational Therapist    Ed Capone, PTA Student PTA Student                         PT Rehab Goals       Row Name 10/22/23 0821             Bed Mobility Goal 1 (PT)    Activity/Assistive Device (Bed Mobility Goal 1, PT) bed mobility activities, all  -MOLINA      Orient Level/Cues Needed (Bed Mobility Goal 1, PT) independent  -MOLINA      Time Frame (Bed Mobility Goal 1, PT) long term goal (LTG);10 days  -MOLINA      Progress/Outcomes (Bed Mobility Goal 1, PT) goal not met  -MOLINA         Transfer Goal 1 (PT)    Activity/Assistive Device (Transfer Goal 1, PT) sit-to-stand/stand-to-sit;bed-to-chair/chair-to-bed;other (see comments)  rwx for bed to/from chair  -MOLINA      Orient Level/Cues Needed (Transfer Goal 1, PT) standby assist  -MOLINA      Time Frame (Transfer Goal 1, PT) long term goal (LTG);10 days  -MOLINA      Progress/Outcome (Transfer Goal 1, PT) goal met  -MOLINA         Gait Training Goal 1 (PT)    Activity/Assistive Device (Gait Training Goal 1, PT) gait (walking locomotion);assistive device use;decrease fall risk;diminish gait deviation;improve balance and speed;increase endurance/gait distance;walker, rolling  -MOLINA      Orient Level (Gait Training Goal 1, PT) standby assist  -MOLINA      Distance (Gait Training Goal 1, PT) 100 ft w/out LOB w/ consistent placement of R foot w/ stepping  -MOLINA      Time Frame (Gait Training Goal 1, PT) long term goal (LTG);10 days  -MOLINA      Progress/Outcome (Gait Training Goal 1, PT) goal met  -MOLINA         Patient Education Goal (PT)    Activity (Patient Education Goal, PT) HEP for R LE strengthening/coordination  -MOLINA      Orient/Cues/Accuracy (Memory Goal 2, PT) demonstrates  adequately;independent;verbalizes understanding  -MOLINA      Time Frame (Patient Education Goal, PT) long term goal (LTG);10 days  -MOLINA      Progress/Outcome (Patient Education Goal, PT) goal not met  -MOLINA                User Key  (r) = Recorded By, (t) = Taken By, (c) = Cosigned By      Initials Name Provider Type Discipline    Pete Crawford, PTA Physical Therapist Assistant PT                        PT Discharge Summary  Anticipated Discharge Disposition (PT): home  Reason for Discharge: Discharge from facility  Outcomes Achieved: Refer to plan of care for updates on goals achieved  Discharge Destination: Home      Pete Perez PTA   10/22/2023

## 2023-10-22 NOTE — THERAPY DISCHARGE NOTE
Acute Care - Occupational Therapy Discharge Summary  [facility]     Patient Name: Charlene Bey  : 1955  MRN: [id number]    Today's Date: 10/22/2023                 Admit Date: 10/16/2023        OT Recommendation and Plan    Visit Dx:    ICD-10-CM ICD-9-CM   1. Gait abnormality [R26.9]  R26.9 781.2   2. 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  M79.609 729.5    R20.2 782.0                OT Rehab Goals       Row Name 10/22/23 0700             Transfer Goal 1 (OT)    Activity/Assistive Device (Transfer Goal 1, OT) toilet  -CS      [place] Level/Cues Needed (Transfer Goal 1, OT) supervision required  -CS      Time Frame (Transfer Goal 1, OT) long term goal (LTG)  -CS      Progress/Outcome (Transfer Goal 1, OT) goal not met  -CS         Dressing Goal 1 (OT)    Activity/Device (Dressing Goal 1, OT) lower body dressing  -CS      [place]/Cues Needed (Dressing Goal 1, OT) supervision required  -CS      Time Frame (Dressing Goal 1, OT) short term goal (STG)  -CS      Progress/Outcome (Dressing Goal 1, OT) goal not met  -CS         Grooming Goal 1 (OT)    Activity/Device (Grooming Goal 1, OT) grooming skills, all  -CS      [place] (Grooming Goal 1, OT) supervision required  -CS      Time Frame (Grooming Goal 1, OT) short term goal (STG)  -CS      Progress/Outcome (Grooming Goal 1, OT) goal not met  -CS                User Key  (r) = Recorded By, (t) = Taken By, (c) = Cosigned By      Initials Name Provider Type Discipline    CS Veronica Barros, OTR/L, CNT Occupational Therapist OT                     Outcome Measures       Row Name 10/20/23 1100 10/19/23 1300 10/19/23 1000       How much help from another person do you currently need...    Turning from your back to your side while in flat bed without using bedrails? 4  -MS (r) MOLINA (t) MS (c) -- 4  -KJ    Moving from lying on back to sitting on the side of a flat bed without bedrails? 4  -MS (r) MOLINA (t) MS (c) -- 4  -KJ     Moving to and from a bed to a chair (including a wheelchair)? 3  -MS (r) MOLINA (t) MS (c) -- 3  -KJ    Standing up from a chair using your arms (e.g., wheelchair, bedside chair)? 3  -MS (r) MOLINA (t) MS (c) -- 3  -KJ    Climbing 3-5 steps with a railing? 2  -MS (r) MOLINA (t) MS (c) -- 2  -KJ    To walk in hospital room? 4  -MS (r) MOLINA (t) MS (c) -- 3  -KJ    AM-PAC 6 Clicks Score (PT) 20  -MS (r) MOLINA (t) -- 19  -KJ    Highest level of mobility 6 --> Walked 10 steps or more  -MS (r) MOLINA (t) -- 6 --> Walked 10 steps or more  -KJ       How much help from another is currently needed...    Putting on and taking off regular lower body clothing? -- 3  -EC --    Bathing (including washing, rinsing, and drying) -- 3  -EC --    Toileting (which includes using toilet bed pan or urinal) -- 3  -EC --    Putting on and taking off regular upper body clothing -- 4  -EC --    Taking care of personal grooming (such as brushing teeth) -- 4  -EC --    Eating meals -- 4  -EC --    AM-PAC 6 Clicks Score (OT) -- 21  -EC --       Functional Assessment    Outcome Measure Options AM-PAC 6 Clicks Basic Mobility (PT)  -MS (r) MOLINA (t) MS (c) AM-PAC 6 Clicks Daily Activity (OT)  -EC AM-PAC 6 Clicks Basic Mobility (PT)  -KJ              User Key  (r) = Recorded By, (t) = Taken By, (c) = Cosigned By      Initials Name Provider Type    Bri Hoffman, PTA Physical Therapist Assistant    Ariadna Sellers R, PT, DPT, NCS Physical Therapist    Loraine Womack, OTR/L Occupational Therapist    Ed Capone, PTA Student PTA Student                    Timed Therapy Charges  Total Units: 3      Suggested Charges  Total Units: 3      Procedure Name Documented Minutes Units Code    HC OT SELF CARE/MGMT/TRAIN EA 15 MIN 50 3   96343 (CPT®)                 Documented Minutes  Total Minutes: 50      Therapy Provided Minutes    10337 - OT Self Care/Mgmt Minutes 50                        OT Discharge Summary  Anticipated Discharge Disposition (OT): inpatient  rehabilitation facility  Reason for Discharge: Discharge from facility  Outcomes Achieved: Refer to plan of care for updates on goals achieved  Discharge Destination: Home with assist      Veronica Barros OTR/L, EMANUEL  10/22/2023

## 2023-10-23 ENCOUNTER — TRANSITIONAL CARE MANAGEMENT TELEPHONE ENCOUNTER (OUTPATIENT)
Dept: CALL CENTER | Facility: HOSPITAL | Age: 68
End: 2023-10-23
Payer: COMMERCIAL

## 2023-10-23 NOTE — OUTREACH NOTE
Call Center TCM Note      Flowsheet Row Responses   [facility] patient discharged from? [place]   Does the patient have one of the following disease processes/diagnoses(primary or secondary)? Other   TCM attempt successful? Yes   Call start time 1405   Call end time 1413   Meds reviewed with patient/caregiver? Yes   Is the patient having any side effects they believe may be caused by any medication additions or changes? No   Does the patient have all medications ordered at discharge? Yes   Is the patient taking all medications as directed (includes completed medication regime)? Yes   Comments PCP hospital f/u appt 10/25/23. Next PCP appt 11/22/23. Neurology appt 12/08/23.   Does the patient have an appointment with their PCP within 7-14 days of discharge? Yes   Has home health visited the patient within 72 hours of discharge? N/A   Home health comments Will be doing outpatient PT.   Has all DME been delivered? Yes   Psychosocial issues? No   Did the patient receive a copy of their discharge instructions? Yes   Nursing interventions Reviewed instructions with patient   What is the patient's perception of their health status since discharge? Improving   Is the patient/caregiver able to teach back signs and symptoms related to disease process for when to call PCP? Yes   Is the patient/caregiver able to teach back signs and symptoms related to disease process for when to call 911? Yes   Is the patient/caregiver able to teach back the hierarchy of who to call/visit for symptoms/problems? PCP, Specialist, Home health nurse, Urgent Care, ED, 911 Yes   If the patient is a current smoker, are they able to teach back resources for cessation? Not a smoker   Additional teach back comments States unable to find charging cord to her glucometer, and will contact her insurance or glucometer provider for replacement.   TCM call completed? Yes   Wrap up additional comments Patient states is much improved. States was able to  take a few steps today without her walker. States still using walker, and will start outpatient PT soon. States has good appetite. States will f/u with insurance or glucometer provider for new charging cord. Denies any other needs today. TCM complete.   Call end time 1413   Would this patient benefit from a Referral to [facility] Social Work? No   Is the patient interested in additional calls from an ambulatory ? No            Delia Lopez RN    10/23/2023, 14:14 CDT

## 2023-10-25 ENCOUNTER — OFFICE VISIT (OUTPATIENT)
Dept: INTERNAL MEDICINE | Facility: CLINIC | Age: 68
End: 2023-10-25
Payer: COMMERCIAL

## 2023-10-25 VITALS
OXYGEN SATURATION: 97 % | BODY MASS INDEX: 31.22 KG/M2 | HEART RATE: 62 BPM | WEIGHT: 198.9 LBS | SYSTOLIC BLOOD PRESSURE: 113 MMHG | RESPIRATION RATE: 18 BRPM | HEIGHT: 67 IN | TEMPERATURE: 98.4 F | DIASTOLIC BLOOD PRESSURE: 72 MMHG

## 2023-10-25 DIAGNOSIS — E11.65 TYPE 2 DIABETES MELLITUS WITH HYPERGLYCEMIA, WITH LONG-TERM CURRENT USE OF INSULIN: ICD-10-CM

## 2023-10-25 DIAGNOSIS — M79.609 PARESTHESIA AND PAIN OF RIGHT EXTREMITY: Primary | ICD-10-CM

## 2023-10-25 DIAGNOSIS — Z79.4 TYPE 2 DIABETES MELLITUS WITH HYPERGLYCEMIA, WITH LONG-TERM CURRENT USE OF INSULIN: ICD-10-CM

## 2023-10-25 DIAGNOSIS — R42 DIZZINESS, NONSPECIFIC: ICD-10-CM

## 2023-10-25 DIAGNOSIS — E78.2 MIXED HYPERLIPIDEMIA: ICD-10-CM

## 2023-10-25 DIAGNOSIS — K86.1 CHRONIC PANCREATITIS, UNSPECIFIED PANCREATITIS TYPE: ICD-10-CM

## 2023-10-25 DIAGNOSIS — R20.2 PARESTHESIA AND PAIN OF RIGHT EXTREMITY: Primary | ICD-10-CM

## 2023-10-25 DIAGNOSIS — N39.41 URGENCY INCONTINENCE: ICD-10-CM

## 2023-10-25 NOTE — PROGRESS NOTES
Transitional Care Follow Up Visit  Subjective     Charlene Bey is a 67 y.o. female who presents for a transitional care management visit.    Within 48 business hours after discharge our office contacted her via telephone to coordinate her care and needs.      I reviewed and discussed the details of that call along with the discharge summary, hospital problems, inpatient lab results, inpatient diagnostic studies, and consultation reports with Charlene.     Current outpatient and discharge medications have been reconciled for the patient.  Reviewed by: GREGORIO Howe          10/20/2023     4:55 PM   Date of TCM Phone Call   Hospital [facility]   Date of Admission 10/16/2023   Date of Discharge 10/20/2023   [place] [facility] [place]     Risk for Readmission (LACE) Score: 5 (10/20/2023  5:00 AM)      History of Present Illness   Course During Hospital Stay: Patient is a 67-year-old female who had presented to [facility] on 10/16/2023 with right lower extremity paresthesia.  She was COVID-positive on 10/11/2023 and was started on Paxlovid with improvement in symptoms.  Patient reported finishing Paxlovid on 10/15/2023.  On 10/16/2023 patient reports waking up in her usual health with having sudden numbness to her right foot.  She describes within 15 minutes the numbness had progressed up to her knee.  With having a family history of stroke patient reports getting dressed to go to the hospital.  By the time she got in the car reports having the numbness up to her right sided abdomen with weakness in her right lower extremity.  She had originally presented to [facility] emergency department with CT of head being negative.  She was transferred from [facility] to [facility] with Dr. Lynn.  Patient was admitted to the neurology floor with paresthesia to the right lower extremity.  MRI head showed no acute findings.  MRI of spine completed  finding moderate multilevel degenerative changes and an 8 mm anterolisthesis of L5 on S1.  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  Patient had declined lumbar puncture.  Per neurology the patient was started on gabapentin 300 mg 3 times a day.  Physical therapy and Occupational Therapy were consulted with patient needing cues for gait training and sequencing due to decreased balance.  She had a tendency to overshoot her right foot placement with gait.  She continue to work with physical therapy throughout the hospitalization and was able to ambulate 100 feet with four-wheel rolling walker.  Patient continued to have some loss of balance and leaning but was able to correct without help.   was consulted for discharge planning with an initial referral made to [facility].  With continued improvement with physical therapy and Occupational Therapy the patient elected for discharge home with outpatient therapy.  He was started on atorvastatin 80 mg nightly for hyperlipidemia with LDL of 124.  A1c was 7.8 with her insulin detemir increased to 10 units nightly while in the hospital.  Patient was discharged home on 10/20/2023 to follow-up with neurology on 12/8/2023.     The patient reports continued numbness in her right leg to her right side and abdomen.  With this she has experienced some incontinence and urgency.  Patient is currently wearing depends.  She is walking with a walker and doing exercises at home. Has presented to outpatient physical therapy. Patient reports therapy not having orders from hospital yet. She continues to have difficulty with placing her right foot. Reports her balance is improving with dizziness improving. Patient states she has been able to take a few steps without use of walker at home.Taking gabapentin as prescribed with improvement in nerve pain.  Reports the pain was severe on 10/17/2023 while in the hospital with significant improvement since starting the  gabapentin.    Reports brain fog and fatigue. Patient states she has had difficulty with complete conversations. Has experienced this before with having COVID in the past.  Describes having the fatigue in the past with statins.     Taking long acting insulin 8 units every night. Just recently started testing her blood glucose and reports it has been running good.     The following portions of the patient's history were reviewed and updated as appropriate: allergies, current medications, past family history, past medical history, past social history, past surgical history, and problem list.    Review of Systems   Constitutional:  Positive for activity change and fatigue.   HENT: Negative.     Eyes: Negative.    Respiratory: Negative.     Cardiovascular: Negative.    Gastrointestinal: Negative.    Genitourinary:  Positive for urgency.        Incontinence at times   Musculoskeletal:  Positive for gait problem.   Skin: Negative.    Neurological:  Positive for dizziness and weakness.   Psychiatric/Behavioral:  Positive for decreased concentration.        Objective   Physical Exam  Vitals and nursing note reviewed.   Constitutional:       General: She is not in acute distress.     Appearance: Normal appearance. She is not ill-appearing.   HENT:      Head: Normocephalic.      Right Ear: Tympanic membrane normal.      Nose: Nose normal.      Mouth/Throat:      Mouth: Mucous membranes are moist.      Pharynx: Oropharynx is clear.   Eyes:      Extraocular Movements: Extraocular movements intact.      Pupils: Pupils are equal, round, and reactive to light.   Cardiovascular:      Rate and Rhythm: Normal rate and regular rhythm.      Pulses: Normal pulses.   Pulmonary:      Effort: Pulmonary effort is normal. No respiratory distress.   Abdominal:      Palpations: Abdomen is soft.   Musculoskeletal:         General: Normal range of motion.      Cervical back: Normal range of motion.   Skin:     General: Skin is warm and dry.       Capillary Refill: Capillary refill takes less than 2 seconds.   Neurological:      General: No focal deficit present.      Mental Status: She is alert and oriented to person, place, and time.      Sensory: Sensory deficit (numbness right lower extremity to right abdomen) present.      Coordination: Coordination abnormal (RLE).      Gait: Gait abnormal (using walker in office).         Assessment & Plan   Diagnoses and all orders for this visit:    1. 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 Mixed hyperlipidemia    3. Type 2 diabetes mellitus with hyperglycemia, with long-term current use of insulin    4. Urgency incontinence    5. Chronic pancreatitis, unspecified pancreatitis type    6. Dizziness, nonspecific        - Ok to take gabapentin twice daily with the fatigue.  We will follow-up at next visit concerning adjustments to dosing.  - Discussed cutting back on atorvastatin with reports of previous issues.  Will assess tolerance at next visit.  - Continue checking blood glucose in the mornings and keeping record. May need to increase long acting insulin if fasting glucose is remaining elevated.  - Discussed having scheduled voiding every 3-4 hours at this time to avoid incontinence.  - We will check to make sure outpatient physical and occupational therapy orders are in place.    - Patient asked about driving and what is advised to not drive at this time with continued right lower extremity weakness and abnormal coordination.

## 2023-10-27 DIAGNOSIS — Z12.11 COLON CANCER SCREENING: Primary | ICD-10-CM

## 2023-11-13 DIAGNOSIS — R42 DIZZINESS: ICD-10-CM

## 2023-11-13 RX ORDER — HYDROXYZINE HYDROCHLORIDE 25 MG/1
25 TABLET, FILM COATED ORAL DAILY PRN
Qty: 90 TABLET | Refills: 0 | Status: SHIPPED | OUTPATIENT
Start: 2023-11-13

## 2023-11-22 ENCOUNTER — OFFICE VISIT (OUTPATIENT)
Dept: INTERNAL MEDICINE | Facility: CLINIC | Age: 68
End: 2023-11-22
Payer: COMMERCIAL

## 2023-11-22 VITALS
WEIGHT: 201 LBS | RESPIRATION RATE: 18 BRPM | HEIGHT: 67 IN | OXYGEN SATURATION: 98 % | TEMPERATURE: 98.7 F | BODY MASS INDEX: 31.55 KG/M2 | HEART RATE: 65 BPM | SYSTOLIC BLOOD PRESSURE: 136 MMHG | DIASTOLIC BLOOD PRESSURE: 98 MMHG

## 2023-11-22 DIAGNOSIS — M62.838 MUSCLE SPASMS OF BOTH LOWER EXTREMITIES: ICD-10-CM

## 2023-11-22 DIAGNOSIS — R10.84 ABDOMINAL DISCOMFORT, GENERALIZED: ICD-10-CM

## 2023-11-22 DIAGNOSIS — R20.2 PARESTHESIA AND PAIN OF RIGHT EXTREMITY: Primary | ICD-10-CM

## 2023-11-22 DIAGNOSIS — Z79.899 ENCOUNTER FOR LONG-TERM CURRENT USE OF MEDICATION: ICD-10-CM

## 2023-11-22 DIAGNOSIS — M79.609 PARESTHESIA AND PAIN OF RIGHT EXTREMITY: Primary | ICD-10-CM

## 2023-11-22 DIAGNOSIS — Z79.4 TYPE 2 DIABETES MELLITUS WITH HYPERGLYCEMIA, WITH LONG-TERM CURRENT USE OF INSULIN: ICD-10-CM

## 2023-11-22 DIAGNOSIS — K59.00 CONSTIPATION, UNSPECIFIED CONSTIPATION TYPE: ICD-10-CM

## 2023-11-22 DIAGNOSIS — E11.65 TYPE 2 DIABETES MELLITUS WITH HYPERGLYCEMIA, WITH LONG-TERM CURRENT USE OF INSULIN: ICD-10-CM

## 2023-11-22 LAB
AMPHET+METHAMPHET UR QL: NEGATIVE
AMPHETAMINE INTERNAL CONTROL: ABNORMAL
AMPHETAMINES UR QL: NEGATIVE
BARBITURATE INTERNAL CONTROL: ABNORMAL
BARBITURATES UR QL SCN: NEGATIVE
BENZODIAZ UR QL SCN: NEGATIVE
BENZODIAZEPINE INTERNAL CONTROL: ABNORMAL
BUPRENORPHINE INTERNAL CONTROL: ABNORMAL
BUPRENORPHINE SERPL-MCNC: NEGATIVE NG/ML
CANNABINOIDS SERPL QL: NEGATIVE
COCAINE INTERNAL CONTROL: ABNORMAL
COCAINE UR QL: NEGATIVE
EDDP INTERNAL CONTROL: ABNORMAL
EDDP UR QL SCN: NEGATIVE
MDMA (ECSTASY) INTERNAL CONTROL: ABNORMAL
MDMA UR QL SCN: NEGATIVE
METHADONE INTERNAL CONTROL: ABNORMAL
METHADONE UR QL SCN: NEGATIVE
METHAMPHETAMINE INTERNAL CONTROL: ABNORMAL
MORPHINE INTERNAL CONTROL: ABNORMAL
MORPHINE UR QL SCN: NEGATIVE
OXYCODONE INTERNAL CONTROL: ABNORMAL
OXYCODONE UR QL SCN: NEGATIVE
PCP UR QL SCN: NEGATIVE
PHENCYCLIDINE INTERNAL CONTROL: ABNORMAL
PROPOXYPH UR QL SCN: NEGATIVE
PROPOXYPHENE INTERNAL CONTROL: ABNORMAL
THC INTERNAL CONTROL: ABNORMAL
TRICYCLIC ANTIDEPRESSANTS INTERNAL CONTROL: ABNORMAL
TRICYCLICS UR QL SCN: NEGATIVE

## 2023-11-22 PROCEDURE — 99214 OFFICE O/P EST MOD 30 MIN: CPT

## 2023-11-22 PROCEDURE — 80305 DRUG TEST PRSMV DIR OPT OBS: CPT

## 2023-11-22 RX ORDER — METHOCARBAMOL 500 MG/1
500 TABLET, FILM COATED ORAL 3 TIMES DAILY
Qty: 90 TABLET | Refills: 0 | Status: SHIPPED | OUTPATIENT
Start: 2023-11-22

## 2023-11-22 RX ORDER — GABAPENTIN 300 MG/1
300 CAPSULE ORAL 2 TIMES DAILY
Qty: 60 CAPSULE | Refills: 0 | Status: SHIPPED | OUTPATIENT
Start: 2023-11-22

## 2023-11-22 RX ORDER — GABAPENTIN 300 MG/1
300 CAPSULE ORAL 3 TIMES DAILY
Qty: 90 CAPSULE | Refills: 1 | Status: CANCELLED | OUTPATIENT
Start: 2023-11-22

## 2023-11-22 NOTE — PROGRESS NOTES
"Answers submitted by the patient for this visit:  Primary Reason for Visit (Submitted on 11/20/2023)  What is the primary reason for your visit?: Neurological Problem  Chief Complaint  Hip Pain (\"Nerve pain on right side, radiates from back to hamstring area\") and Spasms (Right leg spasms at night)    Tank Bey presents to [facility]  History of Present Illness  Patient is 67-year-old female following up today after having had paresthesia and pain of right extremity suspected post-COVID. Continues to experience numbness in the right side and down her right leg.     Was not able to tolerate atorvastatin. This caused her fatigue with her reporting falling asleep with sitting down. Had trouble focusing and staying awake during conversation.     Blood glucose has been running 138-153 in the mornings. Still taking 8 units of insulin detemir nightly.     She reports continuing physical therapy. Has been able to cut back to using a cane instead of the walker. Has noticed herself forgetting the cane around the house and not always need it. When going out if she has something to hold on to she gets around ok. Has had some difficulty with walking distances. Reports after 100 to 125 feet becoming fatigued. Is interested in a temporary parking tag.     Taking Gabapentin mostly once a day at night. The nerve pain is the worst at night when she takes the Gabapentin. Taking acetaminophen or ibuprofen during the day for pain. She does report taking Gabapentin during the day if having more severe pain with relief. Was initially taking three times a day as prescribed and had weaned down to twice a day before cutting back to mostly once daily.     Describes having muscle spasms in her right leg. These are more severe in the evenings. She has been taking methocarbamol and Saleem's leg cramps with relief.     She reports urinary incontinence has resolved. Still continuing to have " "problems with constipation. Describes feeling full but not able to have a bowel movement. Taking colace twice daily with one in the morning and two at night. Stool is described as \"rabbit pellets\". Drinking more water and has tried taking some metamucil with no relief. Reports her anus had felt flaccid but is starting to feel more firm. She has appointment with gastroenterology on 12/8/2023 for initial about colonoscopy.     Past Medical History:   Diagnosis Date    Anxiety several years    Asthma lifetime    Cataract removed R  2019, L 2017    Cholelithiasis removed 2014    Cystocele with rectocele     Diabetes mellitus     GERD (gastroesophageal reflux disease) 15 plus years    Goiter     Hashimoto's thyroiditis     Headache     Hyperlipidemia 1 year    Hypothyroidism     Mumps pancreatitis     Obesity 40 plus years    Pancreatitis     Peptic ulceration     Thyroid nodule     Visual impairment 5 plus years     Past Surgical History:   Procedure Laterality Date    ADENOIDECTOMY  1957    BLADDER SUSPENSION      CHOLECYSTECTOMY      COLON SURGERY  1990    COLONOSCOPY  2017    COLOSTOMY      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    CYSTOCELE REPAIR      HERNIA REPAIR      HYSTERECTOMY      OVARIAN CYST REMOVAL      RECTOCELE REPAIR      SMALL INTESTINE SURGERY      TONSILLECTOMY       Social History     Socioeconomic History    Marital status:    Tobacco Use    Smoking status: Never     Passive exposure: Never    Smokeless tobacco: Never   Vaping Use    Vaping Use: Never used   Substance and Sexual Activity    Alcohol use: Never    Drug use: Never    Sexual activity: Not Currently     Partners: Male     Birth control/protection: Post-menopausal, Hysterectomy     Comment:  has ED from prostate cancer treatment     Family History   Problem Relation Age of Onset    Cancer Mother     Arthritis Mother     Early death Mother     Stroke Father     Hypertension Father     COPD Father     Depression Father  " "   Early death Father     Heart disease Maternal Grandmother     Hyperlipidemia Maternal Grandmother     Early death Brother        Objective   Vital Signs:  /98 (BP Location: Left arm, Patient Position: Sitting, Cuff Size: Large Adult)   Pulse 65   Temp 98.7 °F (37.1 °C) (Infrared)   Resp 18   Ht 170.2 cm (67\")   Wt 91.2 kg (201 lb)   SpO2 98%   BMI 31.48 kg/m²   Estimated body mass index is 31.48 kg/m² as calculated from the following:    Height as of this encounter: 170.2 cm (67\").    Weight as of this encounter: 91.2 kg (201 lb).            Physical Exam  Vitals and nursing note reviewed.   Constitutional:       Appearance: Normal appearance.   HENT:      Head: Normocephalic.      Nose: Nose normal.      Mouth/Throat:      Mouth: Mucous membranes are moist.      Pharynx: Oropharynx is clear.   Eyes:      Pupils: Pupils are equal, round, and reactive to light.   Cardiovascular:      Rate and Rhythm: Normal rate and regular rhythm.      Pulses: Normal pulses.      Heart sounds: Normal heart sounds.   Pulmonary:      Effort: Pulmonary effort is normal. No respiratory distress.      Breath sounds: Normal breath sounds.   Abdominal:      General: Bowel sounds are decreased. There is distension.      Palpations: Abdomen is soft. There is no mass.      Tenderness: There is no abdominal tenderness.   Musculoskeletal:         General: Normal range of motion.      Cervical back: Normal range of motion.   Skin:     General: Skin is warm and dry.      Capillary Refill: Capillary refill takes less than 2 seconds.   Neurological:      General: No focal deficit present.      Mental Status: She is alert.      Sensory: Sensory deficit (numbness right abdomen and right lower extremity) present.      Coordination: Coordination abnormal (RLE, improved from previous encounter).      Gait: Gait abnormal (using cane in office today).   Psychiatric:         Mood and Affect: Mood normal.            Result Review :  The " following data was reviewed by: GREGORIO Howe on 11/22/2023:  CMP          10/18/2023    03:50 10/19/2023    05:10 10/20/2023    04:04   CMP   Glucose 189  199  188    BUN 15  16  15    Creatinine 0.65  0.61  0.64    EGFR 96.6  98.1  97.0    Sodium 139  140  142    Potassium 3.9  4.1  4.1    Chloride 103  104  104    Calcium 9.8  9.5  9.8    Total Protein 7.3  6.9  7.0    Albumin 4.4  4.3  4.3    Globulin 2.9  2.6  2.7    Total Bilirubin 0.2  0.2  0.3    Alkaline Phosphatase 71  72  72    AST (SGOT) 15  15  15    ALT (SGPT) 15  16  15    Albumin/Globulin Ratio 1.5  1.7  1.6    BUN/Creatinine Ratio 23.1  26.2  23.4    Anion Gap 9.0  10.0  8.0      CBC          10/18/2023    03:50 10/19/2023    05:10 10/20/2023    04:04   CBC   WBC 8.07  7.78  7.36    RBC 4.83  4.83  4.73    Hemoglobin 12.9  13.0  12.7    Hematocrit 39.9  40.3  39.6    MCV 82.6  83.4  83.7    MCH 26.7  26.9  26.8    MCHC 32.3  32.3  32.1    RDW 14.8  14.5  14.5    Platelets 187  174  165      Lipid Panel          10/16/2023    15:51   Lipid Panel   Total Cholesterol 180    Triglycerides 123    HDL Cholesterol 34    VLDL Cholesterol 22    LDL Cholesterol  124    LDL/HDL Ratio 3.57      TSH          7/25/2023    14:17 10/16/2023    15:51   TSH   TSH 5.000  4.370               Assessment and Plan   Diagnoses and all orders for this visit:    1. 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  Dispense: 60 capsule; Refill: 0    2. Muscle spasms of both lower extremities  -     methocarbamol (ROBAXIN) 500 MG tablet; Take 1 tablet by mouth 3 (Three) Times a Day.  Dispense: 90 tablet; Refill: 0    3. Abdominal discomfort, generalized  -     XR Abdomen KUB    4. Constipation, unspecified constipation type  -     XR Abdomen KUB    5. Type 2 diabetes mellitus with hyperglycemia, with long-term current use of insulin    6. Encounter  for long-term current use of medication  -     POC Urine Drug Screen ([facility] ONLY)      - Will provide refill of gabapentin today.  Patient reports she ultimately wants to not take this long-term.  She signed controlled substance agreement in office today.  - With ongoing pain and weakness in her right lower extremity, will sign for temporary disability parking tag as she is currently in therapy and reports only being able to ambulate about 100 feet before becoming fatigued.  - Ok to continue methocarbamol as needed for muscle spasms.   - Discussed Miralax for constipation and to discuss with gastroenterology. Will obtain KUB to assess at this time.   - Increase insulin detemir to 10 units nightly. Discussed increasing 2 units at a time adjusting based off of her fasting glucose.          Follow Up   Return in about 2 months (around 1/22/2024) for Recheck.  Patient was given instructions and counseling regarding her condition or for health maintenance advice. Please see specific information pulled into the AVS if appropriate.

## 2023-12-07 ENCOUNTER — TELEPHONE (OUTPATIENT)
Dept: NEUROLOGY | Facility: CLINIC | Age: 68
End: 2023-12-07
Payer: COMMERCIAL

## 2023-12-07 NOTE — TELEPHONE ENCOUNTER
MATTY with patient reminding her of her appointment with Jacob Espinoza NP on Dec. 8th at 1pm.  I have ask that she arrive at 15 minutes early.

## 2023-12-08 ENCOUNTER — OFFICE VISIT (OUTPATIENT)
Dept: GASTROENTEROLOGY | Facility: CLINIC | Age: 68
End: 2023-12-08
Payer: COMMERCIAL

## 2023-12-08 ENCOUNTER — OFFICE VISIT (OUTPATIENT)
Dept: NEUROLOGY | Facility: CLINIC | Age: 68
End: 2023-12-08
Payer: COMMERCIAL

## 2023-12-08 ENCOUNTER — PATIENT ROUNDING (BHMG ONLY) (OUTPATIENT)
Dept: GASTROENTEROLOGY | Facility: CLINIC | Age: 68
End: 2023-12-08
Payer: COMMERCIAL

## 2023-12-08 VITALS
TEMPERATURE: 98 F | WEIGHT: 198 LBS | BODY MASS INDEX: 31.08 KG/M2 | DIASTOLIC BLOOD PRESSURE: 74 MMHG | HEART RATE: 81 BPM | HEIGHT: 67 IN | SYSTOLIC BLOOD PRESSURE: 116 MMHG | OXYGEN SATURATION: 96 %

## 2023-12-08 VITALS
HEART RATE: 78 BPM | RESPIRATION RATE: 18 BRPM | BODY MASS INDEX: 31.39 KG/M2 | WEIGHT: 200 LBS | SYSTOLIC BLOOD PRESSURE: 130 MMHG | HEIGHT: 67 IN | DIASTOLIC BLOOD PRESSURE: 78 MMHG

## 2023-12-08 DIAGNOSIS — R19.4 CHANGE IN BOWEL HABITS: Primary | ICD-10-CM

## 2023-12-08 DIAGNOSIS — Z86.010 HISTORY OF COLON POLYPS: ICD-10-CM

## 2023-12-08 DIAGNOSIS — M79.609 PARESTHESIA AND PAIN OF RIGHT EXTREMITY: Primary | ICD-10-CM

## 2023-12-08 DIAGNOSIS — R20.2 PARESTHESIA AND PAIN OF RIGHT EXTREMITY: Primary | ICD-10-CM

## 2023-12-08 PROBLEM — Z12.11 ENCOUNTER FOR SCREENING COLONOSCOPY: Status: RESOLVED | Noted: 2023-12-08 | Resolved: 2023-12-08

## 2023-12-08 PROBLEM — Z86.0100 HISTORY OF COLON POLYPS: Status: ACTIVE | Noted: 2023-12-08

## 2023-12-08 PROBLEM — Z12.11 ENCOUNTER FOR SCREENING COLONOSCOPY: Status: ACTIVE | Noted: 2023-12-08

## 2023-12-08 PROBLEM — K59.00 CONSTIPATION: Status: ACTIVE | Noted: 2023-12-08

## 2023-12-08 PROCEDURE — 99214 OFFICE O/P EST MOD 30 MIN: CPT | Performed by: NURSE PRACTITIONER

## 2023-12-08 RX ORDER — DOCUSATE SODIUM 100 MG/1
100 CAPSULE, LIQUID FILLED ORAL 2 TIMES DAILY
COMMUNITY

## 2023-12-08 RX ORDER — SODIUM, POTASSIUM,MAG SULFATES 17.5-3.13G
1 SOLUTION, RECONSTITUTED, ORAL ORAL EVERY 12 HOURS
Qty: 354 ML | Refills: 0 | Status: SHIPPED | OUTPATIENT
Start: 2023-12-08

## 2023-12-08 NOTE — PROGRESS NOTES
December 8, 2023    Hello, may I speak with Charlene Bey?    My name is       I am  with [facility]  [facility]  [address], [address]  [place] [address] [phone].    Before we get started may I verify your date of birth? 1955    I am calling to officially welcome you to our practice and ask about your recent visit. Is this a good time to talk?     Tell me about your visit with us. What things went well?  Pt says everything went well. She had all questions answered in the office and knows to call us if she has anymore.       We're always looking for ways to make our patients' experiences even better. Do you have recommendations on ways we may improve?  no    Overall were you satisfied with your first visit to our practice? yes       I appreciate you taking the time to speak with me today. Is there anything else I can do for you? no      Thank you, and have a great day.

## 2023-12-08 NOTE — PROGRESS NOTES
"Primary Physician: Alex Saravia APRN    Chief Complaint   Patient presents with    Constipation     Pt states she had Covid for the 4th time in October and she woke up the next day and was paralyzed on the right side of her body from under her rib cage down-states since then it feels like her bowels \"haven't woken up\"-is having to use Dulcolax to have BM's; Pt's last colon  was in 2017 in [place]-states it was normal/no polyps; Pt states she did have polyps \"years ago\" prior to 2017 colon    History of pancreatitis     Pt also states she has a history of pancreatitis with EPI-takes Creon and usually does good        Subjective     Charlene Bey is a 67 y.o. female.    HPI  Hx Colon Polyps  Patient here for purposes of setting up surveillance colonoscopy.  She has had previous colonoscopy in 2017 in [place] that she reports was normal, however, prior to that she reports she had colon polyps. No records available for review. No pathology or location of previous polyps.  There is no family history of colon cancer to report.  Patient denies any change in bowel habits.  No diarrhea, abdominal pain or rectal bleeding.    Change in Bowel Habits  Pt has had Covid for the 4th time in October 2023 leading to paralysis (right sided) and that lead to 5 day hospitalization.  She is taking Colace.  She has had to apply external pressure to her rectum in the past expel the stools from her rectum.  More recently she has had explosive diarrhea. She has had some incontinent issues. Having some uncontrolled BM's. No blood in her stools.        Hx Idiopathic Pancreatitis  Pt reports her 1st bout started as a child but was told 4 years ago it was idiopathic pancreatitis.  She is on pancreas enzyme replacement.  Starting pancreas enzyme replacement her symptoms have been controlled with very seldom periods of nausea and vomiting.  Taking Creon TID.  No alcohol or tobacco use.    Past Medical History:   Diagnosis Date    Anxiety  "    Asthma     Cataract     Removed-Right in 2019 and Left in 2017    Cholelithiasis     Removed in 2014    Cystocele with rectocele     GERD (gastroesophageal reflux disease)     Goiter     Hashimoto's thyroiditis     Headache     Hyperlipidemia     Hypothyroidism     Mumps pancreatitis     Obesity     Pancreatitis     Peptic ulceration     Thyroid nodule     Type 2 diabetes mellitus     Visual impairment        Past Surgical History:   Procedure Laterality Date    ADENOIDECTOMY  1957    BLADDER SUSPENSION      CHOLECYSTECTOMY      COLON RESECTION  1995    After surgery for adhesions that got her bowel    COSMETIC SURGERY  2012    Carcinoma removed from above Left eye    CYSTOCELE REPAIR      HERNIA REPAIR      HYSTERECTOMY      OVARIAN CYST REMOVAL      RECTOCELE REPAIR      SMALL INTESTINE SURGERY      TONSILLECTOMY          Current Outpatient Medications:     CALCIUM-MAGNESIUM-ZINC PO, Take 1 tablet by mouth Daily., Disp: , Rfl:     Coenzyme Q10 (CoQ10) 100 MG capsule, Take 3 capsules by mouth Daily., Disp: , Rfl:     Cyanocobalamin (B-12) 1000 MCG tablet, Take 1 tablet by mouth Daily., Disp: , Rfl:     docusate sodium (DULCOLAX) 100 MG capsule, Take 1 capsule by mouth 2 (Two) Times a Day., Disp: , Rfl:     estradiol (ESTRACE) 0.1 MG/GM vaginal cream, Insert 2 applicators into the vagina Daily As Needed. 2-3 times wk, Disp: , Rfl:     gabapentin (NEURONTIN) 300 MG capsule, Take 1 capsule by mouth 2 (Two) Times a Day., Disp: 60 capsule, Rfl: 0    glipizide (GLUCOTROL XL) 10 MG 24 hr tablet, Take 1 tablet by mouth Daily., Disp: , Rfl:     glucosamine-chondroitin 500-400 MG capsule capsule, Take 1 capsule by mouth 3 (Three) Times a Day With Meals., Disp: , Rfl:     hydrOXYzine (ATARAX) 25 MG tablet, TAKE 1 TABLET BY MOUTH DAILY AS NEEDED (DIZZINESS)., Disp: 90 tablet, Rfl: 0    insulin detemir (Levemir FlexPen) 100 UNIT/ML injection, Inject 8 Units under the skin into the appropriate area as directed Daily. (Patient  taking differently: Inject 8 Units under the skin into the appropriate area as directed Every Night.), Disp: 3 mL, Rfl: 2    levothyroxine (SYNTHROID, LEVOTHROID) 150 MCG tablet, Take 1 tablet by mouth Daily. Am, tues/thurs/sa/su, Disp: , Rfl:     levothyroxine (SYNTHROID, LEVOTHROID) 175 MCG tablet, Take 1 tablet by mouth Every Morning. Monday, Wednesday and Friday, Disp: , Rfl:     magnesium gluconate (MAGONATE) 500 MG tablet, Take 1 tablet by mouth 2 (Two) Times a Day., Disp: , Rfl:     methocarbamol (ROBAXIN) 500 MG tablet, Take 1 tablet by mouth 3 (Three) Times a Day., Disp: 90 tablet, Rfl: 0    montelukast (SINGULAIR) 10 MG tablet, Take 1 tablet by mouth Daily., Disp: , Rfl:     Omega-3 Fatty Acids (fish oil) 1000 MG capsule capsule, Take 1 capsule by mouth Daily With Breakfast., Disp: , Rfl:     ondansetron (ZOFRAN) 4 MG tablet, Take 1 tablet by mouth Every 8 (Eight) Hours As Needed for Nausea or Vomiting., Disp: 30 tablet, Rfl: 2    Pancrelipase, Lip-Prot-Amyl, (CREON) 14759-999096 units capsule delayed-release particles capsule, Take 1 capsule by mouth 3 (Three) Times a Day With Meals., Disp: , Rfl:     Probiotic Product (Risaquad-2) capsule capsule, Take 1 capsule by mouth Daily., Disp: , Rfl:     rizatriptan (MAXALT) 10 MG tablet, Take 1 tablet by mouth 1 (One) Time As Needed., Disp: , Rfl:     sertraline (ZOLOFT) 100 MG tablet, Take 1 tablet by mouth Daily., Disp: , Rfl:     vitamin D3 125 MCG (5000 UT) capsule capsule, Take 1 capsule by mouth Daily., Disp: , Rfl:     sodium-potassium-magnesium sulfates (Suprep Bowel Prep Kit) 17.5-3.13-1.6 GM/177ML solution oral solution, Take 1 bottle by mouth Every 12 (Twelve) Hours., Disp: 354 mL, Rfl: 0    Allergies   Allergen Reactions    Compazine [Prochlorperazine] Seizure    Erythromycin Itching and Other (See Comments)     Balance issues      Ultram [Tramadol] Other (See Comments)     faints    Adhesive Tape Rash       Social History     Socioeconomic History  "   Marital status:    Tobacco Use    Smoking status: Never     Passive exposure: Never    Smokeless tobacco: Never   Vaping Use    Vaping Use: Never used   Substance and Sexual Activity    Alcohol use: Never    Drug use: Never    Sexual activity: Not Currently     Partners: Male     Birth control/protection: Post-menopausal, Hysterectomy     Comment:  has ED from prostate cancer treatment       Family History   Problem Relation Age of Onset    Cancer Mother     Arthritis Mother     Early death Mother     Stroke Father     Hypertension Father     COPD Father     Depression Father     Early death Father     Early death Brother     Heart disease Maternal Grandmother     Hyperlipidemia Maternal Grandmother     Colon cancer Neg Hx     Colon polyps Neg Hx     Esophageal cancer Neg Hx     Liver cancer Neg Hx     Rectal cancer Neg Hx     Liver disease Neg Hx     Stomach cancer Neg Hx        Review of Systems   Constitutional:  Negative for unexpected weight change.   Respiratory:  Negative for shortness of breath.    Cardiovascular:  Negative for chest pain.   Gastrointestinal:  Positive for constipation and diarrhea. Negative for abdominal pain and blood in stool.       Objective     /74 (BP Location: Left arm, Patient Position: Sitting, Cuff Size: Adult)   Pulse 81   Temp 98 °F (36.7 °C) (Infrared)   Ht 170.2 cm (67\")   Wt 89.8 kg (198 lb)   SpO2 96%   Breastfeeding No   BMI 31.01 kg/m²     Physical Exam  Vitals reviewed.   Constitutional:       Appearance: Normal appearance.   Cardiovascular:      Rate and Rhythm: Normal rate and regular rhythm.      Heart sounds: Normal heart sounds.   Pulmonary:      Effort: Pulmonary effort is normal.      Breath sounds: Normal breath sounds.   Neurological:      Mental Status: She is alert.         Lab Results - Last 18 Months   Lab Units 10/20/23  0404 10/19/23  0510 10/18/23  0350 10/17/23  0445 10/16/23  1551 07/25/23  1417   GLUCOSE mg/dL 188* 199* 189* " 153* 195* 151*   BUN mg/dL 15 16 15 14 16 15   CREATININE mg/dL 0.64 0.61 0.65 0.76 0.75 0.78   SODIUM mmol/L 142 140 139 140 139 139   POTASSIUM mmol/L 4.1 4.1 3.9 4.0 3.9 4.2   CHLORIDE mmol/L 104 104 103 105 104 102   CO2 mmol/L 30.0* 26.0 27.0 28.0 27.0 22   TOTAL PROTEIN g/dL 7.0 6.9 7.3 6.7 7.1  --    ALBUMIN g/dL 4.3 4.3 4.4 4.2 4.5 4.9   ALT (SGPT) U/L 15 16 15 14 16 21   AST (SGOT) U/L 15 15 15 13 15 21   ALK PHOS U/L 72 72 71 58 63 82   BILIRUBIN mg/dL 0.3 0.2 0.2 0.2 0.2 0.3   GLOBULIN gm/dL 2.7 2.6 2.9 2.5 2.6  --    CRP mg/dL  --   --   --   --  <0.30  --    SED RATE mm/hr  --   --   --   --  6  --        Lab Results - Last 18 Months   Lab Units 10/20/23  0404 10/19/23  0510 10/18/23  0350 10/17/23  0445 10/16/23  1551 07/25/23  1417   HEMOGLOBIN g/dL 12.7 13.0 12.9 12.1 12.5 14.1   HEMATOCRIT % 39.6 40.3 39.9 38.1 38.6 42.0   MCV fL 83.7 83.4 82.6 84.1 84.3 81   WBC 10*3/mm3 7.36 7.78 8.07 8.26 8.11 9.4   RDW % 14.5 14.5 14.8 15.3 15.3 14.6   MPV fL 10.1 9.7 9.8 9.5 9.7  --    PLATELETS 10*3/mm3 165 174 187 161 186 215       Lab Results - Last 18 Months   Lab Units 10/16/23  1551 07/25/23  1417   TSH uIU/mL 4.370* 5.000*   FOLATE ng/mL 17.30  --           IMPRESSION/PLAN:    Assessment & Plan      Problem List Items Addressed This Visit          Gastrointestinal Abdominal     Change in bowel habits - Primary    Overview     Patient has had years of alternating between diarrhea and constipation.  More so constipation recently having to use Colace and apply external pressure to her rectum to evacuate her bowels.  However, she has had a recent change in bowel pattern with explosive diarrhea uncontrollable at times with incontinence.         Relevant Medications    sodium-potassium-magnesium sulfates (Suprep Bowel Prep Kit) 17.5-3.13-1.6 GM/177ML solution oral solution    Other Relevant Orders    Case Request (Completed)    History of colon polyps    Overview     Last colonoscopy in [place] 2017 normal  per pt.  Previous colon polyps in [place], but pathology and location unknown.         Relevant Orders    Case Request (Completed)     [facility]          ..The risks, benefits, and alternatives of colonoscopy were reviewed with the patient today.  Risks including perforation of the colon possibly requiring surgery or colostomy.  Additional risks include risk of bleeding from biopsies or removal of colon tissue.  There is also the risk of a drug reaction or problems with anesthesia.  This will be discussed with the further by the anesthesia team on the day of the procedure.  Lastly there is a possibility of missing a colon polyp or cancer.  The benefits include the diagnosis and management of disease of the colon and rectum.  Alternatives to colonoscopy include barium enema, laboratory testing, radiographic evaluation, or no intervention.  The patient verbalizes understanding and agrees.    In accordance with requirements under the Affordable Care Act, [facility] has provided pricing for all hospital services and items on each of its websites. However, a patient's actual cost may differ based on the services the patient receives to meet individual healthcare needs and based on the benefits provided under the patient’s insurance coverage.        Janet Isbell, APRN  12/08/23  10:12 CST    Part of this note may be an electronic transcription/translation of spoken language to printed text.

## 2023-12-08 NOTE — PROGRESS NOTES
Neurology Consult Note    [facility]  [address], [address]  [place] [address]  Phone: [phone]  Fax: [phone]    Referring Provider:   No ref. provider found  Primary Care Provider:  Alex Saravia APRN    Reason for Consult:  Hospital Followup Right Lower Extremity Paresthesia     Subjective    Pain level increase 300 BID.     Charlene Bey presents to [facility]    History of Present Illness      67-year-old female seen for hospital follow-up of right leg weakness and paresthesias.  Patient presented to our facility on 10/16/2023 with complaints of right leg numbness which then progressed to the entire right side of her upper abdomen.  Additional symptoms developed of right lower extremity weakness.  Denied any other focal symptoms.  She was transported to [facility].  Initial imaging was unremarkable.  CT of her abdomen was performed which did show a moderate amount of stool as well as hernias.  Patient was transferred to our facility for higher level care.  It should be noted that at that time patient had been experiencing fatigue, runny nose and cough.  She did take a home COVID test which was negative and was started on Paxlovid.  COVID testing in the hospital was negative.  She did have an MRI of her brain, T and L-spine with without contrast.  None these revealed any lesions.  While in the hospital patient had a rather extensive differential diagnosis list.  On 10/18/2023 patient reported improvement in her right lower extremity as far as strength.  She was still experiencing some dysesthesias and paresthesias.    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  No active signs of inflammation and inflammatory markers were unremarkable.  No active myelitis on MRI.  No evidence of autoimmune disease.  A lumbar puncture was offered however patient declined.  This was due to having drastic improvement supportive  care.  Patient did have significant relief with gabapentin.    Today patient presents her spouse.  She is able to single-point cane.  She does report she recently weaned herself down to 1 gabapentin a day.  She has had to recently increase this to 2 gabapentin today due to increasing pain.  Her pain is described as strong sharp needles and burning.  This is focused to her right hip.  She also endorses some possible spasms.  She has been in physical therapy since being discharged.  She also reports issues with constipation since being in the hospital.  She has had 2 episodes of fecal incontinence.  However she is also been on stool softener.  She denies any rashes.  Denies any other illnesses besides the above-stated.  She does have a known prior hamstring injury.  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    Type 2 diabetes mellitus with hyperglycemia    Hypothyroidism (acquired)    Chronic pancreatitis    Nonintractable migraine    Dizziness, nonspecific    Mixed hyperlipidemia    Change in bowel habits    History of colon polyps        Past Medical History:   Diagnosis Date    Anxiety     Asthma     Cataract     Removed-Right in 2019 and Left in 2017    Cholelithiasis     Removed in 2014    Cystocele with rectocele     GERD (gastroesophageal reflux disease)     Goiter     Hashimoto's thyroiditis     Headache     Hyperlipidemia     Hypothyroidism     Mumps pancreatitis     Obesity     Pancreatitis     Peptic ulceration     Thyroid nodule     Type 2 diabetes mellitus     Visual impairment         Social History     Socioeconomic History    Marital status:    Tobacco Use    Smoking status: Never     Passive exposure: Never    Smokeless tobacco: Never   Vaping Use    Vaping Use: Never used   Substance and Sexual Activity    Alcohol use: Never    Drug use: Never    Sexual activity: Not Currently     Partners: Male     Birth control/protection:  Post-menopausal, Hysterectomy     Comment:  has ED from prostate cancer treatment        Allergies   Allergen Reactions    Compazine [Prochlorperazine] Seizure    Erythromycin Itching and Other (See Comments)     Balance issues      Ultram [Tramadol] Other (See Comments)     faints    Adhesive Tape Rash          Current Outpatient Medications:     CALCIUM-MAGNESIUM-ZINC PO, Take 1 tablet by mouth Daily., Disp: , Rfl:     Coenzyme Q10 (CoQ10) 100 MG capsule, Take 3 capsules by mouth Daily., Disp: , Rfl:     Cyanocobalamin (B-12) 1000 MCG tablet, Take 1 tablet by mouth Daily., Disp: , Rfl:     docusate sodium (DULCOLAX) 100 MG capsule, Take 1 capsule by mouth 2 (Two) Times a Day., Disp: , Rfl:     estradiol (ESTRACE) 0.1 MG/GM vaginal cream, Insert 2 applicators into the vagina Daily As Needed. 2-3 times wk, Disp: , Rfl:     gabapentin (NEURONTIN) 300 MG capsule, Take 1 capsule by mouth 2 (Two) Times a Day., Disp: 60 capsule, Rfl: 0    glipizide (GLUCOTROL XL) 10 MG 24 hr tablet, Take 1 tablet by mouth Daily., Disp: , Rfl:     glucosamine-chondroitin 500-400 MG capsule capsule, Take 1 capsule by mouth 3 (Three) Times a Day With Meals., Disp: , Rfl:     hydrOXYzine (ATARAX) 25 MG tablet, TAKE 1 TABLET BY MOUTH DAILY AS NEEDED (DIZZINESS)., Disp: 90 tablet, Rfl: 0    insulin detemir (Levemir FlexPen) 100 UNIT/ML injection, Inject 8 Units under the skin into the appropriate area as directed Daily. (Patient taking differently: Inject 8 Units under the skin into the appropriate area as directed Every Night.), Disp: 3 mL, Rfl: 2    levothyroxine (SYNTHROID, LEVOTHROID) 150 MCG tablet, Take 1 tablet by mouth Daily. Am, tues/thurs/sa/su, Disp: , Rfl:     levothyroxine (SYNTHROID, LEVOTHROID) 175 MCG tablet, Take 1 tablet by mouth Every Morning. Monday, Wednesday and Friday, Disp: , Rfl:     magnesium gluconate (MAGONATE) 500 MG tablet, Take 1 tablet by mouth 2 (Two) Times a Day., Disp: , Rfl:     methocarbamol  "(ROBAXIN) 500 MG tablet, Take 1 tablet by mouth 3 (Three) Times a Day., Disp: 90 tablet, Rfl: 0    montelukast (SINGULAIR) 10 MG tablet, Take 1 tablet by mouth Daily., Disp: , Rfl:     Omega-3 Fatty Acids (fish oil) 1000 MG capsule capsule, Take 1 capsule by mouth Daily With Breakfast., Disp: , Rfl:     ondansetron (ZOFRAN) 4 MG tablet, Take 1 tablet by mouth Every 8 (Eight) Hours As Needed for Nausea or Vomiting., Disp: 30 tablet, Rfl: 2    Pancrelipase, Lip-Prot-Amyl, (CREON) 30230-661054 units capsule delayed-release particles capsule, Take 1 capsule by mouth 3 (Three) Times a Day With Meals., Disp: , Rfl:     Probiotic Product (Risaquad-2) capsule capsule, Take 1 capsule by mouth Daily., Disp: , Rfl:     rizatriptan (MAXALT) 10 MG tablet, Take 1 tablet by mouth 1 (One) Time As Needed., Disp: , Rfl:     sertraline (ZOLOFT) 100 MG tablet, Take 1 tablet by mouth Daily., Disp: , Rfl:     sodium-potassium-magnesium sulfates (Suprep Bowel Prep Kit) 17.5-3.13-1.6 GM/177ML solution oral solution, Take 1 bottle by mouth Every 12 (Twelve) Hours., Disp: 354 mL, Rfl: 0    vitamin D3 125 MCG (5000 UT) capsule capsule, Take 1 capsule by mouth Daily., Disp: , Rfl:        Objective   Vital Signs:   /78 (BP Location: Left arm, Patient Position: Sitting)   Pulse 78   Resp 18   Ht 170.2 cm (67\")   Wt 90.7 kg (200 lb)   BMI 31.32 kg/m²       Physical Exam  Vitals and nursing note reviewed.   Constitutional:       General: She is not in acute distress.  HENT:      Head: Normocephalic.   Eyes:      General: Lids are normal.      Extraocular Movements: Extraocular movements intact.      Pupils: Pupils are equal, round, and reactive to light.   Pulmonary:      Effort: Pulmonary effort is normal. No respiratory distress.   Skin:     General: Skin is warm and dry.      Capillary Refill: Capillary refill takes less than 2 seconds.   Neurological:      Mental Status: She is alert.      Motor: Motor strength is normal.     Deep " Tendon Reflexes:      Reflex Scores:       Bicep reflexes are 2+ on the right side and 2+ on the left side.       Brachioradialis reflexes are 2+ on the right side and 2+ on the left side.       Patellar reflexes are 2+ on the right side and 2+ on the left side.       Achilles reflexes are 2+ on the right side and 2+ on the left side.  Psychiatric:         Speech: Speech normal.        Neurological Exam  Mental Status  Alert. Oriented to person, place, time and situation. Speech is normal. Language is fluent with no aphasia.    Cranial Nerves  CN II: Visual fields full to confrontation.  CN III, IV, VI: Extraocular movements intact bilaterally. Normal lids and orbits bilaterally. Pupils equal round and reactive to light bilaterally.  CN V: Facial sensation is normal.  CN VII: Full and symmetric facial movement.  CN IX, X: Palate elevates symmetrically. Normal gag reflex.  CN XI: Shoulder shrug strength is normal.  CN XII: Tongue midline without atrophy or fasciculations.    Motor  Normal muscle bulk throughout. No fasciculations present. Normal muscle tone. No abnormal involuntary movements. Strength is 5/5 throughout all four extremities.    Sensory  Light touch is normal in upper and lower extremities. Pinprick abnormality: Increase sensation to pinprick right lower leg.. Temperature abnormality: Felt colder on right. Vibration abnormality: Vibration decreased right knee.  Vibration sensation was stronger than the lower right extremity.. Proprioception is normal in upper and lower extremities.     Reflexes                                            Right                      Left  Brachioradialis                    2+                         2+  Biceps                                 2+                         2+  Patellar                                2+                         2+  Achilles                                2+                         2+    Right pathological reflexes: Ankle clonus absent.  Left  pathological reflexes: Ankle clonus absent.    Gait  Casual gait: Antalgic gait.      Result Review :   The following data was reviewed by: GREGORIO Guy on 12/08/2023:  CMP          10/18/2023    03:50 10/19/2023    05:10 10/20/2023    04:04   CMP   Glucose 189  199  188    BUN 15  16  15    Creatinine 0.65  0.61  0.64    EGFR 96.6  98.1  97.0    Sodium 139  140  142    Potassium 3.9  4.1  4.1    Chloride 103  104  104    Calcium 9.8  9.5  9.8    Total Protein 7.3  6.9  7.0    Albumin 4.4  4.3  4.3    Globulin 2.9  2.6  2.7    Total Bilirubin 0.2  0.2  0.3    Alkaline Phosphatase 71  72  72    AST (SGOT) 15  15  15    ALT (SGPT) 15  16  15    Albumin/Globulin Ratio 1.5  1.7  1.6    BUN/Creatinine Ratio 23.1  26.2  23.4    Anion Gap 9.0  10.0  8.0      CBC w/diff          10/18/2023    03:50 10/19/2023    05:10 10/20/2023    04:04   CBC w/Diff   WBC 8.07  7.78  7.36    RBC 4.83  4.83  4.73    Hemoglobin 12.9  13.0  12.7    Hematocrit 39.9  40.3  39.6    MCV 82.6  83.4  83.7    MCH 26.7  26.9  26.8    MCHC 32.3  32.3  32.1    RDW 14.8  14.5  14.5    Platelets 187  174  165    Neutrophil Rel % 54.3  55.2  54.5    Immature Granulocyte Rel % 0.2  0.4  0.1    Lymphocyte Rel % 36.8  34.4  35.6    Monocyte Rel % 6.9  7.7  7.7    Eosinophil Rel % 1.4  1.8  1.6    Basophil Rel % 0.4  0.5  0.5      Lipid Panel          10/16/2023    15:51   Lipid Panel   Total Cholesterol 180    Triglycerides 123    HDL Cholesterol 34    VLDL Cholesterol 22    LDL Cholesterol  124    LDL/HDL Ratio 3.57      TSH          7/25/2023    14:17 10/16/2023    15:51   TSH   TSH 5.000  4.370      Most Recent A1C          10/16/2023    15:51   HGBA1C Most Recent   Hemoglobin A1C 7.80         MRI Brain With & Without Contrast (10/17/2023 18:25)  Study Result    Narrative & Impression   EXAM/TECHNIQUE: MRI brain without and with IV contrast     INDICATION: RLE weakness and numbness; R26.9-Unspecified abnormalities  of gait and mobility      COMPARISON: None     FINDINGS:     No diffusion restriction to suggest acute infarction. No increased  susceptibility to suggest acute or chronic intracranial hemorrhage. The  major physiologic flow voids are maintained.     No abnormality of brain parenchyma signal intensity. No midline shift or  mass effect. Lateral ventricles are nondilated. Basilar cisterns are  patent. The sella turcica and its contents appear unremarkable.  Following contrast administration, no abnormal foci of enhancement are  identified.     No acute orbital finding. Small left mastoid effusion. Paranasal sinuses  are clear.     IMPRESSION:     No acute intracranial findings.     This report was signed and finalized on 10/17/2023 6:52 PM CDT by Dr. Luis Chawla MD     MRI Lumbar Spine With & Without Contrast (10/16/2023 18:53)  Study Result    Narrative & Impression   EXAM/TECHNIQUE: MRI lumbar spine without and with IV contrast     INDICATION: Right-sided paresthesias, right lower extremity weakness     COMPARISON: None     FINDINGS:     This report assumes 5 lumbar type vertebral bodies. 8 mm anterolisthesis  of L4 on L5. No other subluxation. Lumbar spine alignment is maintained.  No significant vertebral body marrow edema. No vertebral body height  loss. Mild multilevel mixed Modic type degenerative endplate changes  greatest at L4-L5 and L5-S1. Discs are moderately desiccated throughout.  Moderate multilevel lumbar spine degenerative changes described in  further detail level by level below. The distal cord/conus appears  normal. Prevertebral soft tissues are unremarkable. Following contrast  administration, no abnormal foci of enhancement are identified. No  epidural collections. No paraspinal collection     Multilevel degenerative change:  L1-L2: Disc bulge flattens the ventral thecal sac mildly narrowing the  central canal. Facet arthropathy mildly narrows both neural foramina.     L2-L3: Disc bulge flattens the ventral thecal  sac. Along with facet  arthropathy, there is mild central canal narrowing and mild bilateral  neural foraminal narrowing.     L3-L4: Small disc bulge and facet arthropathy causes mild to moderate  central canal stenosis and mild bilateral neural foraminal stenosis.     L4-L5: Small disc bulge and facet arthropathy causes mild central canal  stenosis and mild left neural foraminal stenosis. There is moderate  right neural foraminal stenosis with disc/osteophyte material closely  approximating and likely abutting the exiting right L4 nerve root.     L5-S1: Central canal is widely patent. Anterolisthesis and disc bulge  causes mild left neural foraminal stenosis and moderate right neural  foraminal stenosis. Disc/osteophyte material closely approximates and  likely abuts the exiting right L5 nerve root.     IMPRESSION:     1.  No acute osseous findings.  2.  8 mm anterolisthesis of L5 on S1.  3.  Moderate multilevel degenerative change, as described above. No area  of severe central canal stenosis. There is moderate right sided neural  foraminal stenosis at L4-L5 and L5-S1 with disc/osteophyte material  closely approximating and likely abutting the exiting right L4 and L5  nerve roots.        This report was signed and finalized on 10/16/2023 7:56 PM CDT by Dr. Luis Chawla MD.     MRI Thoracic Spine With & Without Contrast (10/16/2023 19:09)  Study Result    Narrative & Impression   EXAM/TECHNIQUE: MRI of thoracic spine without and with IV contrast     INDICATION: Right-sided abdominal paresthesias, right lower extremity  weakness and paresthesia     COMPARISON: None     FINDINGS:     Thoracic kyphosis and alignment are maintained. Vertebral bodies are of  normal height and signal intensity. No significant vertebral body marrow  edema. Mild diffuse disc desiccation. Mild multilevel thoracic spine  degenerative change with small endplate osteophytes and mild facet  arthropathy. No area of high-grade central canal  or neural foraminal  stenosis. Multiple small disc bulges are present throughout the thoracic  spine. No signal in the thoracic cord which can be corroborated on more  than one plane of imaging. Paravertebral soft tissues are unremarkable.  Large hiatal hernia. Following contrast administration, no abnormal foci  of enhancement are identified.        IMPRESSION:  1.  No acute findings.  2.  Mild multilevel degenerative change.   3.  Large hiatal hernia.        This report was signed and finalized on 10/16/2023 9:04 PM CDT by Dr. Luis Chawla MD.         H&P by Jose Avery MD (10/16/2023 14:33)  Consults by Sherlyn Fernandes APRN (10/16/2023 15:52)  Progress Notes by Sherlyn Fernandes APRN (10/17/2023 08:43)  Progress Notes by Cirilo Lynn MD (10/18/2023 10:22)  Progress Notes by Sherlyn Fernandes APRN (10/19/2023 09:25)  Discharge Summary by Carito Rosenbaum APRN (10/20/2023 12:06)    Progress Notes by Alex Saravia APRN (10/25/2023 14:00)   Progress Notes by Alex Saravia APRN (11/22/2023 09:30)                Impression:  Charlene Bey is a 67 y.o. female who presents for hospital follow-up.  Her symptoms do raise the possibility of a plexitis versus a neuropathy.  Patient's pain is consistent with a neuropathic pain.  With significant nausea is having gabapentin I recommend continuing this.  Additionally we will move forward with a EMG study.  In the event patient symptoms worsen, we will proceed with a lumbar puncture for further evaluation.  Additionally patient's reports of fecal incontinence do raise suspicion.  However this is only occurred twice since onset of symptoms.  Will continue to observe.    Diagnoses and all orders for this visit:    1. Paresthesia and pain of right extremity (Primary)        Plan:  Continue gabapentin 300 mg capsule 3 times daily.    Recommend x-ray of hip due to complaints of pain.  Defer to PCP for further management.  EMG study with Dr. Faria  Notify  the office with any acute worsening symptoms  Follow-up in 2 months    The patient and I have discussed the plan of care and she is in full agreement at this time.   I spent a total of 60 minutes this encounter.  This includes reviewing prior records, obtaining a thorough HPI, assessment of patient, developing a plan of care with the patient and her spouse, patient and spouse education with discussions as well as documentation.  Follow Up   Return in about 2 months (around 2/8/2024).            GREGORIO Guy  12/08/23  14:16 CST

## 2023-12-08 NOTE — H&P (VIEW-ONLY)
"Primary Physician: Alex Saravia APRN    Chief Complaint   Patient presents with    Constipation     Pt states she had Covid for the 4th time in October and she woke up the next day and was paralyzed on the right side of her body from under her rib cage down-states since then it feels like her bowels \"haven't woken up\"-is having to use Dulcolax to have BM's; Pt's last colon  was in 2017 in [place]-states it was normal/no polyps; Pt states she did have polyps \"years ago\" prior to 2017 colon    History of pancreatitis     Pt also states she has a history of pancreatitis with EPI-takes Creon and usually does good        Subjective     Charlene Bey is a 67 y.o. female.    HPI  Hx Colon Polyps  Patient here for purposes of setting up surveillance colonoscopy.  She has had previous colonoscopy in 2017 in [place] that she reports was normal, however, prior to that she reports she had colon polyps. No records available for review. No pathology or location of previous polyps.  There is no family history of colon cancer to report.  Patient denies any change in bowel habits.  No diarrhea, abdominal pain or rectal bleeding.    Change in Bowel Habits  Pt has had Covid for the 4th time in October 2023 leading to paralysis (right sided) and that lead to 5 day hospitalization.  She is taking Colace.  She has had to apply external pressure to her rectum in the past expel the stools from her rectum.  More recently she has had explosive diarrhea. She has had some incontinent issues. Having some uncontrolled BM's. No blood in her stools.        Hx Idiopathic Pancreatitis  Pt reports her 1st bout started as a child but was told 4 years ago it was idiopathic pancreatitis.  She is on pancreas enzyme replacement.  Starting pancreas enzyme replacement her symptoms have been controlled with very seldom periods of nausea and vomiting.  Taking Creon TID.  No alcohol or tobacco use.    Past Medical History:   Diagnosis Date    Anxiety  "    Asthma     Cataract     Removed-Right in 2019 and Left in 2017    Cholelithiasis     Removed in 2014    Cystocele with rectocele     GERD (gastroesophageal reflux disease)     Goiter     Hashimoto's thyroiditis     Headache     Hyperlipidemia     Hypothyroidism     Mumps pancreatitis     Obesity     Pancreatitis     Peptic ulceration     Thyroid nodule     Type 2 diabetes mellitus     Visual impairment        Past Surgical History:   Procedure Laterality Date    ADENOIDECTOMY  1957    BLADDER SUSPENSION      CHOLECYSTECTOMY      COLON RESECTION  1995    After surgery for adhesions that got her bowel    COSMETIC SURGERY  2012    Carcinoma removed from above Left eye    CYSTOCELE REPAIR      HERNIA REPAIR      HYSTERECTOMY      OVARIAN CYST REMOVAL      RECTOCELE REPAIR      SMALL INTESTINE SURGERY      TONSILLECTOMY          Current Outpatient Medications:     CALCIUM-MAGNESIUM-ZINC PO, Take 1 tablet by mouth Daily., Disp: , Rfl:     Coenzyme Q10 (CoQ10) 100 MG capsule, Take 3 capsules by mouth Daily., Disp: , Rfl:     Cyanocobalamin (B-12) 1000 MCG tablet, Take 1 tablet by mouth Daily., Disp: , Rfl:     docusate sodium (DULCOLAX) 100 MG capsule, Take 1 capsule by mouth 2 (Two) Times a Day., Disp: , Rfl:     estradiol (ESTRACE) 0.1 MG/GM vaginal cream, Insert 2 applicators into the vagina Daily As Needed. 2-3 times wk, Disp: , Rfl:     gabapentin (NEURONTIN) 300 MG capsule, Take 1 capsule by mouth 2 (Two) Times a Day., Disp: 60 capsule, Rfl: 0    glipizide (GLUCOTROL XL) 10 MG 24 hr tablet, Take 1 tablet by mouth Daily., Disp: , Rfl:     glucosamine-chondroitin 500-400 MG capsule capsule, Take 1 capsule by mouth 3 (Three) Times a Day With Meals., Disp: , Rfl:     hydrOXYzine (ATARAX) 25 MG tablet, TAKE 1 TABLET BY MOUTH DAILY AS NEEDED (DIZZINESS)., Disp: 90 tablet, Rfl: 0    insulin detemir (Levemir FlexPen) 100 UNIT/ML injection, Inject 8 Units under the skin into the appropriate area as directed Daily. (Patient  taking differently: Inject 8 Units under the skin into the appropriate area as directed Every Night.), Disp: 3 mL, Rfl: 2    levothyroxine (SYNTHROID, LEVOTHROID) 150 MCG tablet, Take 1 tablet by mouth Daily. Am, tues/thurs/sa/su, Disp: , Rfl:     levothyroxine (SYNTHROID, LEVOTHROID) 175 MCG tablet, Take 1 tablet by mouth Every Morning. Monday, Wednesday and Friday, Disp: , Rfl:     magnesium gluconate (MAGONATE) 500 MG tablet, Take 1 tablet by mouth 2 (Two) Times a Day., Disp: , Rfl:     methocarbamol (ROBAXIN) 500 MG tablet, Take 1 tablet by mouth 3 (Three) Times a Day., Disp: 90 tablet, Rfl: 0    montelukast (SINGULAIR) 10 MG tablet, Take 1 tablet by mouth Daily., Disp: , Rfl:     Omega-3 Fatty Acids (fish oil) 1000 MG capsule capsule, Take 1 capsule by mouth Daily With Breakfast., Disp: , Rfl:     ondansetron (ZOFRAN) 4 MG tablet, Take 1 tablet by mouth Every 8 (Eight) Hours As Needed for Nausea or Vomiting., Disp: 30 tablet, Rfl: 2    Pancrelipase, Lip-Prot-Amyl, (CREON) 50649-007935 units capsule delayed-release particles capsule, Take 1 capsule by mouth 3 (Three) Times a Day With Meals., Disp: , Rfl:     Probiotic Product (Risaquad-2) capsule capsule, Take 1 capsule by mouth Daily., Disp: , Rfl:     rizatriptan (MAXALT) 10 MG tablet, Take 1 tablet by mouth 1 (One) Time As Needed., Disp: , Rfl:     sertraline (ZOLOFT) 100 MG tablet, Take 1 tablet by mouth Daily., Disp: , Rfl:     vitamin D3 125 MCG (5000 UT) capsule capsule, Take 1 capsule by mouth Daily., Disp: , Rfl:     sodium-potassium-magnesium sulfates (Suprep Bowel Prep Kit) 17.5-3.13-1.6 GM/177ML solution oral solution, Take 1 bottle by mouth Every 12 (Twelve) Hours., Disp: 354 mL, Rfl: 0    Allergies   Allergen Reactions    Compazine [Prochlorperazine] Seizure    Erythromycin Itching and Other (See Comments)     Balance issues      Ultram [Tramadol] Other (See Comments)     faints    Adhesive Tape Rash       Social History     Socioeconomic History  "   Marital status:    Tobacco Use    Smoking status: Never     Passive exposure: Never    Smokeless tobacco: Never   Vaping Use    Vaping Use: Never used   Substance and Sexual Activity    Alcohol use: Never    Drug use: Never    Sexual activity: Not Currently     Partners: Male     Birth control/protection: Post-menopausal, Hysterectomy     Comment:  has ED from prostate cancer treatment       Family History   Problem Relation Age of Onset    Cancer Mother     Arthritis Mother     Early death Mother     Stroke Father     Hypertension Father     COPD Father     Depression Father     Early death Father     Early death Brother     Heart disease Maternal Grandmother     Hyperlipidemia Maternal Grandmother     Colon cancer Neg Hx     Colon polyps Neg Hx     Esophageal cancer Neg Hx     Liver cancer Neg Hx     Rectal cancer Neg Hx     Liver disease Neg Hx     Stomach cancer Neg Hx        Review of Systems   Constitutional:  Negative for unexpected weight change.   Respiratory:  Negative for shortness of breath.    Cardiovascular:  Negative for chest pain.   Gastrointestinal:  Positive for constipation and diarrhea. Negative for abdominal pain and blood in stool.       Objective     /74 (BP Location: Left arm, Patient Position: Sitting, Cuff Size: Adult)   Pulse 81   Temp 98 °F (36.7 °C) (Infrared)   Ht 170.2 cm (67\")   Wt 89.8 kg (198 lb)   SpO2 96%   Breastfeeding No   BMI 31.01 kg/m²     Physical Exam  Vitals reviewed.   Constitutional:       Appearance: Normal appearance.   Cardiovascular:      Rate and Rhythm: Normal rate and regular rhythm.      Heart sounds: Normal heart sounds.   Pulmonary:      Effort: Pulmonary effort is normal.      Breath sounds: Normal breath sounds.   Neurological:      Mental Status: She is alert.         Lab Results - Last 18 Months   Lab Units 10/20/23  0404 10/19/23  0510 10/18/23  0350 10/17/23  0445 10/16/23  1551 07/25/23  1417   GLUCOSE mg/dL 188* 199* 189* " 153* 195* 151*   BUN mg/dL 15 16 15 14 16 15   CREATININE mg/dL 0.64 0.61 0.65 0.76 0.75 0.78   SODIUM mmol/L 142 140 139 140 139 139   POTASSIUM mmol/L 4.1 4.1 3.9 4.0 3.9 4.2   CHLORIDE mmol/L 104 104 103 105 104 102   CO2 mmol/L 30.0* 26.0 27.0 28.0 27.0 22   TOTAL PROTEIN g/dL 7.0 6.9 7.3 6.7 7.1  --    ALBUMIN g/dL 4.3 4.3 4.4 4.2 4.5 4.9   ALT (SGPT) U/L 15 16 15 14 16 21   AST (SGOT) U/L 15 15 15 13 15 21   ALK PHOS U/L 72 72 71 58 63 82   BILIRUBIN mg/dL 0.3 0.2 0.2 0.2 0.2 0.3   GLOBULIN gm/dL 2.7 2.6 2.9 2.5 2.6  --    CRP mg/dL  --   --   --   --  <0.30  --    SED RATE mm/hr  --   --   --   --  6  --        Lab Results - Last 18 Months   Lab Units 10/20/23  0404 10/19/23  0510 10/18/23  0350 10/17/23  0445 10/16/23  1551 07/25/23  1417   HEMOGLOBIN g/dL 12.7 13.0 12.9 12.1 12.5 14.1   HEMATOCRIT % 39.6 40.3 39.9 38.1 38.6 42.0   MCV fL 83.7 83.4 82.6 84.1 84.3 81   WBC 10*3/mm3 7.36 7.78 8.07 8.26 8.11 9.4   RDW % 14.5 14.5 14.8 15.3 15.3 14.6   MPV fL 10.1 9.7 9.8 9.5 9.7  --    PLATELETS 10*3/mm3 165 174 187 161 186 215       Lab Results - Last 18 Months   Lab Units 10/16/23  1551 07/25/23  1417   TSH uIU/mL 4.370* 5.000*   FOLATE ng/mL 17.30  --           IMPRESSION/PLAN:    Assessment & Plan      Problem List Items Addressed This Visit          Gastrointestinal Abdominal     Change in bowel habits - Primary    Overview     Patient has had years of alternating between diarrhea and constipation.  More so constipation recently having to use Colace and apply external pressure to her rectum to evacuate her bowels.  However, she has had a recent change in bowel pattern with explosive diarrhea uncontrollable at times with incontinence.         Relevant Medications    sodium-potassium-magnesium sulfates (Suprep Bowel Prep Kit) 17.5-3.13-1.6 GM/177ML solution oral solution    Other Relevant Orders    Case Request (Completed)    History of colon polyps    Overview     Last colonoscopy in [place] 2017 normal  per pt.  Previous colon polyps in [place], but pathology and location unknown.         Relevant Orders    Case Request (Completed)     [facility]          ..The risks, benefits, and alternatives of colonoscopy were reviewed with the patient today.  Risks including perforation of the colon possibly requiring surgery or colostomy.  Additional risks include risk of bleeding from biopsies or removal of colon tissue.  There is also the risk of a drug reaction or problems with anesthesia.  This will be discussed with the further by the anesthesia team on the day of the procedure.  Lastly there is a possibility of missing a colon polyp or cancer.  The benefits include the diagnosis and management of disease of the colon and rectum.  Alternatives to colonoscopy include barium enema, laboratory testing, radiographic evaluation, or no intervention.  The patient verbalizes understanding and agrees.    In accordance with requirements under the Affordable Care Act, [facility] has provided pricing for all hospital services and items on each of its websites. However, a patient's actual cost may differ based on the services the patient receives to meet individual healthcare needs and based on the benefits provided under the patient’s insurance coverage.        Janet Isbell, APRN  12/08/23  10:12 CST    Part of this note may be an electronic transcription/translation of spoken language to printed text.

## 2023-12-18 ENCOUNTER — OFFICE VISIT (OUTPATIENT)
Dept: INTERNAL MEDICINE | Facility: CLINIC | Age: 68
End: 2023-12-18
Payer: COMMERCIAL

## 2023-12-18 VITALS
BODY MASS INDEX: 31.2 KG/M2 | TEMPERATURE: 98.2 F | RESPIRATION RATE: 16 BRPM | HEIGHT: 67 IN | DIASTOLIC BLOOD PRESSURE: 69 MMHG | HEART RATE: 62 BPM | SYSTOLIC BLOOD PRESSURE: 104 MMHG | OXYGEN SATURATION: 97 % | WEIGHT: 198.8 LBS

## 2023-12-18 DIAGNOSIS — R11.0 NAUSEA: ICD-10-CM

## 2023-12-18 DIAGNOSIS — R19.4 CHANGE IN BOWEL HABITS: ICD-10-CM

## 2023-12-18 DIAGNOSIS — Z12.31 BREAST CANCER SCREENING BY MAMMOGRAM: ICD-10-CM

## 2023-12-18 DIAGNOSIS — M79.609 PARESTHESIA AND PAIN OF RIGHT EXTREMITY: ICD-10-CM

## 2023-12-18 DIAGNOSIS — E55.9 VITAMIN D DEFICIENCY: ICD-10-CM

## 2023-12-18 DIAGNOSIS — E03.9 HYPOTHYROIDISM (ACQUIRED): ICD-10-CM

## 2023-12-18 DIAGNOSIS — Z23 NEED FOR INFLUENZA VACCINATION: ICD-10-CM

## 2023-12-18 DIAGNOSIS — R20.2 PARESTHESIA AND PAIN OF RIGHT EXTREMITY: ICD-10-CM

## 2023-12-18 DIAGNOSIS — E11.65 TYPE 2 DIABETES MELLITUS WITH HYPERGLYCEMIA, WITHOUT LONG-TERM CURRENT USE OF INSULIN: ICD-10-CM

## 2023-12-18 DIAGNOSIS — Z00.00 ANNUAL PHYSICAL EXAM: Primary | ICD-10-CM

## 2023-12-18 DIAGNOSIS — Z11.59 ENCOUNTER FOR HEPATITIS C SCREENING TEST FOR LOW RISK PATIENT: ICD-10-CM

## 2023-12-18 DIAGNOSIS — Z12.31 SCREENING MAMMOGRAM FOR BREAST CANCER: ICD-10-CM

## 2023-12-18 RX ORDER — GLIPIZIDE 10 MG/1
10 TABLET, FILM COATED, EXTENDED RELEASE ORAL 2 TIMES DAILY
Qty: 60 TABLET | Refills: 2 | Status: SHIPPED | OUTPATIENT
Start: 2023-12-18

## 2023-12-18 RX ORDER — INSULIN DETEMIR 100 [IU]/ML
12 INJECTION, SOLUTION SUBCUTANEOUS DAILY
Qty: 3 ML | Refills: 2 | Status: SHIPPED | OUTPATIENT
Start: 2023-12-18

## 2023-12-18 RX ORDER — ONDANSETRON 4 MG/1
4 TABLET, FILM COATED ORAL EVERY 8 HOURS PRN
Qty: 30 TABLET | Refills: 2 | Status: SHIPPED | OUTPATIENT
Start: 2023-12-18

## 2023-12-18 NOTE — PROGRESS NOTES
"Chief Complaint  Annual Exam    Subjective        Charlene Bey presents to [facility]  History of Present Illness  Patient is a 67-year-old female presenting today for annual physical exam.     She is continuing to do physical therapy. Continuing to improve with therapy scheduled through January. Working on strength and balance. Reports still \"teetering\" but has not fallen. Able to walk without cane. Still keeping cane with her for balance when she is out. Describes her right side \"forgetting\" at times and has difficulty getting up. Able to get up and once going this slowly improves. This is worse when cold.     Continues to complain of hip and back pain. Has purchased a Leo gun with relief of spasm pain. Taking gabapentin at most twice a day.     Sensation on her right side remains decreased. Is able to feel when about to have bowel movement but can not tell up until that point.     Previously increased insulin detemir to 10 units. Morning glucose is reported to be 150's. She has been watching her diet.     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    Thyroid nodule     Type 2 diabetes mellitus     Visual impairment      Past Surgical History:   Procedure Laterality Date    ADENOIDECTOMY  1957    BLADDER SUSPENSION      CHOLECYSTECTOMY      COLON RESECTION  1995    After surgery for adhesions that got her bowel    COSMETIC SURGERY  2012    Carcinoma removed from above Left eye    CYSTOCELE REPAIR      HERNIA REPAIR      " "HYSTERECTOMY      OVARIAN CYST REMOVAL      RECTOCELE REPAIR      SMALL INTESTINE SURGERY      TONSILLECTOMY       Social History     Socioeconomic History    Marital status:    Tobacco Use    Smoking status: Never     Passive exposure: Never    Smokeless tobacco: Never   Vaping Use    Vaping Use: Never used   Substance and Sexual Activity    Alcohol use: Never    Drug use: Never    Sexual activity: Not Currently     Partners: Male     Birth control/protection: Post-menopausal, Hysterectomy     Comment:  has ED from prostate cancer treatment     Family History   Problem Relation Age of Onset    Cancer Mother     Arthritis Mother     Early death Mother     Stroke Father     Hypertension Father     COPD Father     Depression Father     Early death Father     Early death Brother     Heart disease Maternal Grandmother     Hyperlipidemia Maternal Grandmother     Colon cancer Neg Hx     Colon polyps Neg Hx     Esophageal cancer Neg Hx     Liver cancer Neg Hx     Rectal cancer Neg Hx     Liver disease Neg Hx     Stomach cancer Neg Hx        Objective   Vital Signs:  /69 (BP Location: Left arm, Patient Position: Sitting, Cuff Size: Large Adult)   Pulse 62   Temp 98.2 °F (36.8 °C) (Infrared)   Resp 16   Ht 170.2 cm (67\")   Wt 90.2 kg (198 lb 12.8 oz)   SpO2 97%   BMI 31.14 kg/m²   Estimated body mass index is 31.14 kg/m² as calculated from the following:    Height as of this encounter: 170.2 cm (67\").    Weight as of this encounter: 90.2 kg (198 lb 12.8 oz).          Physical Exam  Vitals and nursing note reviewed.   Constitutional:       Appearance: She is obese. She is not ill-appearing.   HENT:      Head: Normocephalic.      Nose: Nose normal. No congestion.      Mouth/Throat:      Mouth: Mucous membranes are moist.      Pharynx: Oropharynx is clear.   Eyes:      Pupils: Pupils are equal, round, and reactive to light.   Cardiovascular:      Rate and Rhythm: Normal rate and regular rhythm.      " Pulses: Normal pulses.           Dorsalis pedis pulses are 2+ on the right side and 2+ on the left side.      Heart sounds: Normal heart sounds.   Pulmonary:      Effort: Pulmonary effort is normal. No respiratory distress.      Breath sounds: Normal breath sounds.   Abdominal:      General: Bowel sounds are normal.      Palpations: Abdomen is soft.   Musculoskeletal:         General: Normal range of motion.      Cervical back: Normal range of motion.      Right foot: Normal range of motion.      Left foot: Normal range of motion.   Feet:      Right foot:      Protective Sensation: 10 sites tested.  6 sites sensed.      Skin integrity: Skin integrity normal.      Toenail Condition: Right toenails are normal.      Left foot:      Protective Sensation: 10 sites tested.  10 sites sensed.      Skin integrity: Skin integrity normal.      Toenail Condition: Left toenails are normal.   Skin:     General: Skin is warm and dry.      Capillary Refill: Capillary refill takes less than 2 seconds.   Neurological:      General: No focal deficit present.      Mental Status: She is alert.            Result Review :  The following data was reviewed by: GREGORIO Howe on 12/18/2023:  CMP          10/18/2023    03:50 10/19/2023    05:10 10/20/2023    04:04   CMP   Glucose 189  199  188    BUN 15  16  15    Creatinine 0.65  0.61  0.64    EGFR 96.6  98.1  97.0    Sodium 139  140  142    Potassium 3.9  4.1  4.1    Chloride 103  104  104    Calcium 9.8  9.5  9.8    Total Protein 7.3  6.9  7.0    Albumin 4.4  4.3  4.3    Globulin 2.9  2.6  2.7    Total Bilirubin 0.2  0.2  0.3    Alkaline Phosphatase 71  72  72    AST (SGOT) 15  15  15    ALT (SGPT) 15  16  15    Albumin/Globulin Ratio 1.5  1.7  1.6    BUN/Creatinine Ratio 23.1  26.2  23.4    Anion Gap 9.0  10.0  8.0      CBC          10/18/2023    03:50 10/19/2023    05:10 10/20/2023    04:04   CBC   WBC 8.07  7.78  7.36    RBC 4.83  4.83  4.73    Hemoglobin 12.9  13.0  12.7     Hematocrit 39.9  40.3  39.6    MCV 82.6  83.4  83.7    MCH 26.7  26.9  26.8    MCHC 32.3  32.3  32.1    RDW 14.8  14.5  14.5    Platelets 187  174  165                 Assessment and Plan   Diagnoses and all orders for this visit:    1. Annual physical exam (Primary)  -     CBC & Differential  -     Comprehensive Metabolic Panel  -     Lipid Panel  -     TSH  -     T4, Free    2. Need for influenza vaccination  -     Fluzone High-Dose 65+yrs    3. Type 2 diabetes mellitus with hyperglycemia, without long-term current use of insulin  -     glipizide (GLUCOTROL XL) 10 MG 24 hr tablet; Take 1 tablet by mouth 2 (Two) Times a Day.  Dispense: 60 tablet; Refill: 2  -     insulin detemir (Levemir FlexPen) 100 UNIT/ML injection; Inject 12 Units under the skin into the appropriate area as directed Daily.  Dispense: 3 mL; Refill: 2    4. Nausea  -     ondansetron (ZOFRAN) 4 MG tablet; Take 1 tablet by mouth Every 8 (Eight) Hours As Needed for Nausea or Vomiting.  Dispense: 30 tablet; Refill: 2    5. Hypothyroidism (acquired)  -     TSH  -     T4, Free    6. 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 Change in bowel habits  Overview:  Patient has had years of alternating between diarrhea and constipation.  More so constipation recently having to use Colace and apply external pressure to her rectum to evacuate her bowels.  However, she has had a recent change in bowel pattern with explosive diarrhea uncontrollable at times with incontinence.      8. Vitamin D deficiency  -     Vitamin D 25 hydroxy    9. Breast cancer screening by mammogram    10. Screening mammogram for breast cancer  -     Mammo Screening Digital Tomosynthesis Bilateral With CAD; Future    11. Encounter for hepatitis C screening test for low risk patient  -     Hepatitis C antibody      Health Maintenance Counseling:  --Nutrition: Stressed importance of moderation in sodium/caffeine intake, saturated fat and cholesterol, caloric  balance, sufficient intake of fresh fruits, vegetables, fiber, calcium and vit D  --Exercise: Recommended 30 minutes of exercise daily.  --Immunizations discussed and encouraged.  --Screening colonoscopy is scheduled 1/4/24.           Follow Up   Return in about 3 months (around 3/18/2024) for Recheck.  Patient was given instructions and counseling regarding her condition or for health maintenance advice. Please see specific information pulled into the AVS if appropriate.

## 2023-12-18 NOTE — LETTER
[facility]  Vaccine Consent Form    Patient Name:  Charlene Bey  Patient :  1955     Vaccine(s) Ordered    Fluzone High-Dose 65+yrs        Screening Checklist  The following questions should be completed prior to vaccination. If you answer “yes” to any question, it does not necessarily mean you should not be vaccinated. It just means we may need to clarify or ask more questions. If a question is unclear, please ask your healthcare provider to explain it.    Yes No   Any fever or moderate to severe illness today (mild illness and/or antibiotic treatment are not contraindications)?     Do you have a history of a serious reaction to any previous vaccinations, such as anaphylaxis, encephalopathy within 7 days, Guillain-Rosemount syndrome within 6 weeks, seizure?     Have you received any live vaccine(s) (e.g MMR, SPENSER) or any other vaccines in the last month (to ensure duplicate doses aren't given)?     Do you have an anaphylactic allergy to latex (DTaP, DTaP-IPV, Hep A, Hep B, MenB, RV, Td, Tdap), baker’s yeast (Hep B, HPV), polysorbates (RSV, nirsevimab, PCV 20, Rotavirrus, Tdap, Shingrix), or gelatin (SPENSER, MMR)?     Do you have an anaphylactic allergy to neomycin (Rabies, SPENSER, MMR, IPV, Hep A), polymyxin B (IPV), or streptomycin (IPV)?      Any cancer, leukemia, AIDS, or other immune system disorder? (SPENSER, MMR, RV)     Do you have a parent, brother, or sister with an immune system problem (if immune competence of vaccine recipient clinically verified, can proceed)? (MMR, SPENSER)     Any recent steroid treatments for >2 weeks, chemotherapy, or radiation treatment? (SPENSER, MMR)     Have you received antibody-containing blood transfusions or IVIG in the past 11 months (recommended interval is dependent on product)? (MMR, SPENSER)     Have you taken antiviral drugs (acyclovir, famciclovir, valacyclovir for SPENSER) in the last 24 or 48 hours, respectively?      Are you pregnant or planning to become pregnant within 1 month?  (SPENSER, MMR, HPV, IPV, MenB, Abrexvy; For Hep B- refer to Engerix-B; For RSV - Abrysvo is indicated for 32-36 weeks of pregnancy from September to January)     For infants, have you ever been told your child has had intussusception or a medical emergency involving obstruction of the intestine (Rotavirus)? If not for an infant, can skip this question.         *Ordering Physician/APC should be consulted if “yes” is checked by the patient or guardian above.      I have received, read, and understand the Vaccine Information Statement (VIS) for each vaccine ordered above.  I have considered my health status as well as the health status of my close contacts.  I have taken the opportunity to discuss my vaccine questions with my health care provider.   I have requested that the ordered vaccine(s) be given to me.  I understand the benefits and risks of the vaccines.  I understand that I should remain in the clinic for 15 minutes after receiving the vaccine(s).  _________________________________________________________  Signature of Patient or Parent/Legal Guardian ____________________  Date

## 2023-12-18 NOTE — LETTER
[facility]  Vaccine Consent Form    Patient Name:  Charlene Bey  Patient :  1955     Vaccine(s) Ordered    Fluzone High-Dose 65+yrs        Screening Checklist  The following questions should be completed prior to vaccination. If you answer “yes” to any question, it does not necessarily mean you should not be vaccinated. It just means we may need to clarify or ask more questions. If a question is unclear, please ask your healthcare provider to explain it.    Yes No   Any fever or moderate to severe illness today (mild illness and/or antibiotic treatment are not contraindications)?     Do you have a history of a serious reaction to any previous vaccinations, such as anaphylaxis, encephalopathy within 7 days, Guillain-[place] syndrome within 6 weeks, seizure?     Have you received any live vaccine(s) (e.g MMR, SPENSER) or any other vaccines in the last month (to ensure duplicate doses aren't given)?     Do you have an anaphylactic allergy to latex (DTaP, DTaP-IPV, Hep A, Hep B, MenB, RV, Td, Tdap), baker’s yeast (Hep B, HPV), polysorbates (RSV, nirsevimab, PCV 20, Rotavirrus, Tdap, Shingrix), or gelatin (SPENSER, MMR)?     Do you have an anaphylactic allergy to neomycin (Rabies, SPENSER, MMR, IPV, Hep A), polymyxin B (IPV), or streptomycin (IPV)?      Any cancer, leukemia, AIDS, or other immune system disorder? (SPENSER, MMR, RV)     Do you have a parent, brother, or sister with an immune system problem (if immune competence of vaccine recipient clinically verified, can proceed)? (MMR, SPENSER)     Any recent steroid treatments for >2 weeks, chemotherapy, or radiation treatment? (SPENSER, MMR)     Have you received antibody-containing blood transfusions or IVIG in the past 11 months (recommended interval is dependent on product)? (MMR, SPENSER)     Have you taken antiviral drugs (acyclovir, famciclovir, valacyclovir for SPENSER) in the last 24 or 48 hours, respectively?      Are you pregnant or planning to become pregnant within 1 month?  (SPENSER, MMR, HPV, IPV, MenB, Abrexvy; For Hep B- refer to Engerix-B; For RSV - Abrysvo is indicated for 32-36 weeks of pregnancy from September to January)     For infants, have you ever been told your child has had intussusception or a medical emergency involving obstruction of the intestine (Rotavirus)? If not for an infant, can skip this question.         *Ordering Physician/APC should be consulted if “yes” is checked by the patient or guardian above.      I have received, read, and understand the Vaccine Information Statement (VIS) for each vaccine ordered above.  I have considered my health status as well as the health status of my close contacts.  I have taken the opportunity to discuss my vaccine questions with my health care provider.   I have requested that the ordered vaccine(s) be given to me.  I understand the benefits and risks of the vaccines.  I understand that I should remain in the clinic for 15 minutes after receiving the vaccine(s).  _________________________________________________________  Signature of Patient or Parent/Legal Guardian ____________________  Date

## 2023-12-19 LAB
25(OH)D3+25(OH)D2 SERPL-MCNC: 33.7 NG/ML (ref 30–100)
ALBUMIN SERPL-MCNC: 4.9 G/DL (ref 3.9–4.9)
ALBUMIN/GLOB SERPL: 1.8 {RATIO} (ref 1.2–2.2)
ALP SERPL-CCNC: 86 IU/L (ref 44–121)
ALT SERPL-CCNC: 19 IU/L (ref 0–32)
AST SERPL-CCNC: 21 IU/L (ref 0–40)
BASOPHILS # BLD AUTO: 0 X10E3/UL (ref 0–0.2)
BASOPHILS NFR BLD AUTO: 1 %
BILIRUB SERPL-MCNC: 0.3 MG/DL (ref 0–1.2)
BUN SERPL-MCNC: 14 MG/DL (ref 8–27)
BUN/CREAT SERPL: 17 (ref 12–28)
CALCIUM SERPL-MCNC: 10.1 MG/DL (ref 8.7–10.3)
CHLORIDE SERPL-SCNC: 103 MMOL/L (ref 96–106)
CHOLEST SERPL-MCNC: 181 MG/DL (ref 100–199)
CO2 SERPL-SCNC: 24 MMOL/L (ref 20–29)
CREAT SERPL-MCNC: 0.83 MG/DL (ref 0.57–1)
EGFRCR SERPLBLD CKD-EPI 2021: 77 ML/MIN/1.73
EOSINOPHIL # BLD AUTO: 0.2 X10E3/UL (ref 0–0.4)
EOSINOPHIL NFR BLD AUTO: 2 %
ERYTHROCYTE [DISTWIDTH] IN BLOOD BY AUTOMATED COUNT: 14 % (ref 11.7–15.4)
GLOBULIN SER CALC-MCNC: 2.8 G/DL (ref 1.5–4.5)
GLUCOSE SERPL-MCNC: 153 MG/DL (ref 70–99)
HCT VFR BLD AUTO: 39.9 % (ref 34–46.6)
HCV IGG SERPL QL IA: NON REACTIVE
HDLC SERPL-MCNC: 41 MG/DL
HGB BLD-MCNC: 13.8 G/DL (ref 11.1–15.9)
IMM GRANULOCYTES # BLD AUTO: 0 X10E3/UL (ref 0–0.1)
IMM GRANULOCYTES NFR BLD AUTO: 0 %
LDLC SERPL CALC-MCNC: 119 MG/DL (ref 0–99)
LYMPHOCYTES # BLD AUTO: 2.8 X10E3/UL (ref 0.7–3.1)
LYMPHOCYTES NFR BLD AUTO: 35 %
Lab: NORMAL
MCH RBC QN AUTO: 28 PG (ref 26.6–33)
MCHC RBC AUTO-ENTMCNC: 34.6 G/DL (ref 31.5–35.7)
MCV RBC AUTO: 81 FL (ref 79–97)
MONOCYTES # BLD AUTO: 0.6 X10E3/UL (ref 0.1–0.9)
MONOCYTES NFR BLD AUTO: 7 %
NEUTROPHILS # BLD AUTO: 4.4 X10E3/UL (ref 1.4–7)
NEUTROPHILS NFR BLD AUTO: 55 %
PLATELET # BLD AUTO: 203 X10E3/UL (ref 150–450)
POTASSIUM SERPL-SCNC: 4.2 MMOL/L (ref 3.5–5.2)
PROT SERPL-MCNC: 7.7 G/DL (ref 6–8.5)
RBC # BLD AUTO: 4.93 X10E6/UL (ref 3.77–5.28)
SODIUM SERPL-SCNC: 140 MMOL/L (ref 134–144)
T4 FREE SERPL-MCNC: 1.25 NG/DL (ref 0.82–1.77)
TRIGL SERPL-MCNC: 116 MG/DL (ref 0–149)
TSH SERPL DL<=0.005 MIU/L-ACNC: 6 UIU/ML (ref 0.45–4.5)
VLDLC SERPL CALC-MCNC: 21 MG/DL (ref 5–40)
WBC # BLD AUTO: 7.9 X10E3/UL (ref 3.4–10.8)

## 2023-12-20 ENCOUNTER — TELEPHONE (OUTPATIENT)
Dept: INTERNAL MEDICINE | Facility: CLINIC | Age: 68
End: 2023-12-20
Payer: COMMERCIAL

## 2023-12-20 NOTE — TELEPHONE ENCOUNTER
Pt returned Alex's call.  I gave her lab results and advised her to increase Levothyroxine to 175 mcg and to take Vit D supplement and we would recheck at next visit.  She voiced understanding.  She will call with any issues or concerns.

## 2024-01-03 DIAGNOSIS — Z13.79 GENETIC TESTING: Primary | ICD-10-CM

## 2024-01-03 LAB
NCCN CRITERIA FLAG: ABNORMAL
TYRER CUZICK SCORE: 11.3

## 2024-01-03 NOTE — PROGRESS NOTES
This patient recently took the CARE risk assessment for a mammogram appointment. Based on the patient's responses, NCCN criteria for genetic testing was met.  I spoke to the patient, she would like genetic testing.  I will contact her provider for an order and if approved, she can have the blood draw at her upcoming mammogram appointment.

## 2024-01-04 ENCOUNTER — HOSPITAL ENCOUNTER (OUTPATIENT)
Facility: HOSPITAL | Age: 69
Setting detail: HOSPITAL OUTPATIENT SURGERY
Discharge: HOME | End: 2024-01-04
Attending: INTERNAL MEDICINE | Admitting: INTERNAL MEDICINE
Payer: COMMERCIAL

## 2024-01-04 ENCOUNTER — ANESTHESIA (OUTPATIENT)
Dept: GASTROENTEROLOGY | Facility: HOSPITAL | Age: 69
End: 2024-01-04
Payer: COMMERCIAL

## 2024-01-04 ENCOUNTER — ANESTHESIA EVENT (OUTPATIENT)
Dept: GASTROENTEROLOGY | Facility: HOSPITAL | Age: 69
End: 2024-01-04
Payer: COMMERCIAL

## 2024-01-04 VITALS
BODY MASS INDEX: 30.76 KG/M2 | WEIGHT: 196 LBS | RESPIRATION RATE: 18 BRPM | HEART RATE: 56 BPM | HEIGHT: 67 IN | OXYGEN SATURATION: 98 % | SYSTOLIC BLOOD PRESSURE: 118 MMHG | TEMPERATURE: 97.8 F | DIASTOLIC BLOOD PRESSURE: 66 MMHG

## 2024-01-04 DIAGNOSIS — Z86.010 HISTORY OF COLON POLYPS: ICD-10-CM

## 2024-01-04 DIAGNOSIS — R19.4 CHANGE IN BOWEL HABITS: ICD-10-CM

## 2024-01-04 DIAGNOSIS — R42 DIZZINESS: ICD-10-CM

## 2024-01-04 LAB — GLUCOSE BLDC GLUCOMTR-MCNC: 174 MG/DL (ref 70–130)

## 2024-01-04 PROCEDURE — 25010000002 PROPOFOL 10 MG/ML EMULSION: Performed by: NURSE ANESTHETIST, CERTIFIED REGISTERED

## 2024-01-04 PROCEDURE — 25810000003 SODIUM CHLORIDE 0.9 % SOLUTION: Performed by: ANESTHESIOLOGY

## 2024-01-04 PROCEDURE — 45380 COLONOSCOPY AND BIOPSY: CPT | Performed by: INTERNAL MEDICINE

## 2024-01-04 PROCEDURE — 88305 TISSUE EXAM BY PATHOLOGIST: CPT | Performed by: INTERNAL MEDICINE

## 2024-01-04 PROCEDURE — 82948 REAGENT STRIP/BLOOD GLUCOSE: CPT

## 2024-01-04 RX ORDER — PROPOFOL 10 MG/ML
VIAL (ML) INTRAVENOUS AS NEEDED
Status: DISCONTINUED | OUTPATIENT
Start: 2024-01-04 | End: 2024-01-04 | Stop reason: SURG

## 2024-01-04 RX ORDER — SODIUM CHLORIDE 0.9 % (FLUSH) 0.9 %
10 SYRINGE (ML) INJECTION AS NEEDED
Status: DISCONTINUED | OUTPATIENT
Start: 2024-01-04 | End: 2024-01-04 | Stop reason: HOSPADM

## 2024-01-04 RX ORDER — LIDOCAINE HYDROCHLORIDE 20 MG/ML
INJECTION, SOLUTION EPIDURAL; INFILTRATION; INTRACAUDAL; PERINEURAL AS NEEDED
Status: DISCONTINUED | OUTPATIENT
Start: 2024-01-04 | End: 2024-01-04 | Stop reason: SURG

## 2024-01-04 RX ORDER — HYDROXYZINE HYDROCHLORIDE 25 MG/1
25 TABLET, FILM COATED ORAL DAILY PRN
Qty: 90 TABLET | Refills: 0 | Status: SHIPPED | OUTPATIENT
Start: 2024-01-04

## 2024-01-04 RX ORDER — SODIUM CHLORIDE 9 MG/ML
500 INJECTION, SOLUTION INTRAVENOUS CONTINUOUS PRN
Status: DISCONTINUED | OUTPATIENT
Start: 2024-01-04 | End: 2024-01-04 | Stop reason: HOSPADM

## 2024-01-04 RX ORDER — LIDOCAINE HYDROCHLORIDE 10 MG/ML
0.5 INJECTION, SOLUTION EPIDURAL; INFILTRATION; INTRACAUDAL; PERINEURAL ONCE AS NEEDED
Status: CANCELLED | OUTPATIENT
Start: 2024-01-04

## 2024-01-04 RX ADMIN — PROPOFOL INJECTABLE EMULSION 70 MG: 10 INJECTION, EMULSION INTRAVENOUS at 08:18

## 2024-01-04 RX ADMIN — LIDOCAINE HYDROCHLORIDE 100 MG: 20 INJECTION, SOLUTION EPIDURAL; INFILTRATION; INTRACAUDAL; PERINEURAL at 08:14

## 2024-01-04 RX ADMIN — PROPOFOL INJECTABLE EMULSION 80 MG: 10 INJECTION, EMULSION INTRAVENOUS at 08:14

## 2024-01-04 RX ADMIN — SODIUM CHLORIDE 500 ML: 9 INJECTION, SOLUTION INTRAVENOUS at 07:52

## 2024-01-04 RX ADMIN — PROPOFOL INJECTABLE EMULSION 50 MG: 10 INJECTION, EMULSION INTRAVENOUS at 08:24

## 2024-01-04 NOTE — ANESTHESIA PREPROCEDURE EVALUATION
Anesthesia Evaluation     Patient summary reviewed   history of anesthetic complications:  PONV  NPO Solid Status: > 8 hours             Airway   Mallampati: II  Dental      Pulmonary - negative pulmonary ROS   Cardiovascular - negative cardio ROS  Exercise tolerance: excellent (>7 METS)        Neuro/Psych- negative ROS  GI/Hepatic/Renal/Endo    (+) obesity, GERD, diabetes mellitus using insulin, thyroid problem hypothyroidism    Musculoskeletal     Abdominal    Substance History      OB/GYN          Other                    Anesthesia Plan    ASA 3     MAC       Anesthetic plan, risks, benefits, and alternatives have been provided, discussed and informed consent has been obtained with: patient.    CODE STATUS:

## 2024-01-04 NOTE — TELEPHONE ENCOUNTER
Rx Refill Note  Requested Prescriptions     Pending Prescriptions Disp Refills    hydrOXYzine (ATARAX) 25 MG tablet [Pharmacy Med Name: HYDROXYZINE HCL 25 MG TABLET] 90 tablet 0     Sig: TAKE 1 TABLET BY MOUTH DAILY AS NEEDED (DIZZINESS).      Last office visit with prescribing clinician: 12/18/2023   Last telemedicine visit with prescribing clinician: Visit date not found   Next office visit with prescribing clinician: 3/18/2024                         Would you like a call back once the refill request has been completed: [] Yes [] No    If the office needs to give you a call back, can they leave a voicemail: [] Yes [] No    Miley Sherwood MA  01/04/24, 09:32 CST

## 2024-01-04 NOTE — ANESTHESIA POSTPROCEDURE EVALUATION
"Patient: Charlene Bey    Procedure Summary       Date: 01/04/24 Room / Location:  [facility] /  [facility]    Anesthesia Start: 0810 Anesthesia Stop: 0832    Procedure: COLONOSCOPY WITH ANESTHESIA Diagnosis:       Change in bowel habits      History of colon polyps      (Change in bowel habits [R19.4])      (History of colon polyps [Z86.010])    Surgeons: Eneida Maldonado MD Provider: Lucius Simms CRNA    Anesthesia Type: MAC ASA Status: 3            Anesthesia Type: MAC    Vitals  Vitals Value Taken Time   BP     Temp     Pulse 54 01/04/24 0832   Resp     SpO2 96 % 01/04/24 0832   Vitals shown include unfiled device data.        Post Anesthesia Care and Evaluation    Patient location during evaluation: PHASE II  Patient participation: complete - patient participated  Level of consciousness: awake  Pain score: 0  Pain management: adequate    Airway patency: patent  Anesthetic complications: No anesthetic complications  PONV Status: none  Cardiovascular status: acceptable  Respiratory status: acceptable  Hydration status: acceptable    Comments: Blood pressure 135/64, pulse 63, temperature 97.8 °F (36.6 °C), temperature source Temporal, resp. rate 18, height 170.2 cm (67\"), weight 88.9 kg (196 lb), SpO2 95%, not currently breastfeeding.      "

## 2024-01-05 LAB
CYTO UR: NORMAL
LAB AP CASE REPORT: NORMAL
Lab: NORMAL
PATH REPORT.FINAL DX SPEC: NORMAL
PATH REPORT.GROSS SPEC: NORMAL

## 2024-01-22 ENCOUNTER — TELEPHONE (OUTPATIENT)
Dept: INTERNAL MEDICINE | Facility: CLINIC | Age: 69
End: 2024-01-22

## 2024-01-22 DIAGNOSIS — J45.20 MILD INTERMITTENT ASTHMA, UNSPECIFIED WHETHER COMPLICATED: Primary | ICD-10-CM

## 2024-01-22 RX ORDER — ALBUTEROL SULFATE 90 UG/1
2 AEROSOL, METERED RESPIRATORY (INHALATION) EVERY 4 HOURS PRN
Qty: 18 G | Refills: 5 | Status: SHIPPED | OUTPATIENT
Start: 2024-01-22

## 2024-01-22 NOTE — TELEPHONE ENCOUNTER
Called and discussed use of albuterol inhaler. Patient reports a history of asthma. Describes in the past needing to use nebulized treatments with symbicort for control. Since moving here reports only needing to occasionally use albuterol for wheezing with resolution. Offered Airsupra with patient wanting to keep with albuterol at this time. Will reach out if needing to use inhaler more frequently to try airsupra. No further questions.   
I can't find this med in epic. 
Pt called requesting a script sent in for albuterol sulfatate [facility] free aerosole. NDC# [id number]  This is the Albuterol her insurance will approve.  Pt uses: [facility]          
No

## 2024-01-22 NOTE — TELEPHONE ENCOUNTER
Per pharmacist at [facility], Claribel will only approve the Generic Pro-Air.  She states that pt out of pocket expense will be approx $16.  She will get it ready for pt to .

## 2024-01-30 ENCOUNTER — TELEPHONE (OUTPATIENT)
Dept: NEUROLOGY | Facility: HOSPITAL | Age: 69
End: 2024-01-30

## 2024-01-31 ENCOUNTER — HOSPITAL ENCOUNTER (OUTPATIENT)
Dept: NEUROLOGY | Facility: HOSPITAL | Age: 69
Discharge: HOME | End: 2024-01-31
Payer: COMMERCIAL

## 2024-01-31 DIAGNOSIS — M79.609 PARESTHESIA AND PAIN OF RIGHT EXTREMITY: ICD-10-CM

## 2024-01-31 DIAGNOSIS — R20.2 PARESTHESIA AND PAIN OF RIGHT EXTREMITY: ICD-10-CM

## 2024-01-31 PROCEDURE — 95885 MUSC TST DONE W/NERV TST LIM: CPT | Performed by: PSYCHIATRY & NEUROLOGY

## 2024-01-31 PROCEDURE — 95885 MUSC TST DONE W/NERV TST LIM: CPT

## 2024-01-31 PROCEDURE — 95910 NRV CNDJ TEST 7-8 STUDIES: CPT | Performed by: PSYCHIATRY & NEUROLOGY

## 2024-01-31 PROCEDURE — 95910 NRV CNDJ TEST 7-8 STUDIES: CPT

## 2024-02-02 NOTE — PROGRESS NOTES
No signs of lumbar spine issues. Bilateral low amplitude of legs. Continue gabapentin. We will discuss further in clinic on 2-8

## 2024-02-07 ENCOUNTER — TELEPHONE (OUTPATIENT)
Dept: NEUROLOGY | Facility: CLINIC | Age: 69
End: 2024-02-07
Payer: COMMERCIAL

## 2024-02-08 ENCOUNTER — HOSPITAL ENCOUNTER (OUTPATIENT)
Dept: GENERAL RADIOLOGY | Facility: HOSPITAL | Age: 69
Discharge: HOME | End: 2024-02-08
Payer: COMMERCIAL

## 2024-02-08 ENCOUNTER — OFFICE VISIT (OUTPATIENT)
Dept: NEUROLOGY | Facility: CLINIC | Age: 69
End: 2024-02-08
Payer: COMMERCIAL

## 2024-02-08 ENCOUNTER — PATIENT ROUNDING (BHMG ONLY) (OUTPATIENT)
Dept: NEUROLOGY | Facility: CLINIC | Age: 69
End: 2024-02-08
Payer: COMMERCIAL

## 2024-02-08 VITALS
BODY MASS INDEX: 30.76 KG/M2 | SYSTOLIC BLOOD PRESSURE: 122 MMHG | OXYGEN SATURATION: 98 % | WEIGHT: 196 LBS | HEIGHT: 67 IN | HEART RATE: 71 BPM | DIASTOLIC BLOOD PRESSURE: 82 MMHG

## 2024-02-08 DIAGNOSIS — M79.609 PARESTHESIA AND PAIN OF RIGHT EXTREMITY: Primary | ICD-10-CM

## 2024-02-08 DIAGNOSIS — M79.609 PARESTHESIA AND PAIN OF RIGHT EXTREMITY: ICD-10-CM

## 2024-02-08 DIAGNOSIS — R20.2 PARESTHESIA AND PAIN OF RIGHT EXTREMITY: ICD-10-CM

## 2024-02-08 DIAGNOSIS — M43.16 ANTEROLISTHESIS OF LUMBAR SPINE: ICD-10-CM

## 2024-02-08 DIAGNOSIS — R20.2 PARESTHESIA AND PAIN OF RIGHT EXTREMITY: Primary | ICD-10-CM

## 2024-02-08 PROCEDURE — 72170 X-RAY EXAM OF PELVIS: CPT

## 2024-02-08 PROCEDURE — 73503 X-RAY EXAM HIP UNI 4/> VIEWS: CPT

## 2024-02-08 RX ORDER — PEN NEEDLE, DIABETIC 31 GX5/16"
1 NEEDLE, DISPOSABLE MISCELLANEOUS
COMMUNITY
Start: 2024-01-16

## 2024-02-08 RX ORDER — GABAPENTIN 300 MG/1
300 CAPSULE ORAL 2 TIMES DAILY
Qty: 60 CAPSULE | Refills: 0 | Status: SHIPPED | OUTPATIENT
Start: 2024-02-08

## 2024-02-08 NOTE — PROGRESS NOTES
Neurology Consult Note    [facility]  [address], [address]  [place] [address]  Phone: [phone]  Fax: [phone]    Referring Provider:   No ref. provider found  Primary Care Provider:  Alex Saravia APRN    Reason for Consult:  Right lower extremity paresthesia  Subjective      Charlene Bey presents to [facility]    History of Present Illness  68-year-old female seen for follow-up of right lower extremity with paresthesia.  Patient last seen in clinic on 12/8/2023.  During that visit patient reported she decreased her gabapentin to 1 times daily.  She occasionally take 2 a day if her pain is worse.  Exam that day revealed normal reflexes.  Normal strength.  She did have abnormal sensory deficits.  I did send her for an EMG study.  EMG did reveal bilateral tibial motor nerve showed reduced amplitude.  Additionally the right sural sensory showed prolonged distal peak latency.  There is no evidence of electrical instability in her muscles.    Today patient is seen for follow-up.  She reports to me since last being seen she is improving.  However she does have symptoms.  Patient reports her pain is described as very sharp.  It does originate at her back and travel down the posterior aspect of her right leg.  She described is also been exhausting.  She can have 1 to 2 days weekly where she is pain-free.  She has reported subjective weakness of the bilateral anterior thighs.  She does report worsening pain with prolonged standing.  She denies any falls.  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    Type 2 diabetes mellitus with hyperglycemia    Hypothyroidism (acquired)    Chronic pancreatitis    Nonintractable migraine    Dizziness, nonspecific    Mixed hyperlipidemia    Change in bowel habits    History of colon polyps        Past Medical History:   Diagnosis Date    Anxiety     Asthma      Cancer     carcinoma removed over right eye    Cataract     Removed-Right in 2019 and Left in 2017    Cholelithiasis     Removed in 2014    Cystocele with rectocele     GERD (gastroesophageal reflux disease)     Goiter     Hashimoto's thyroiditis     Headache     HL (hearing loss)     age related    Hyperlipidemia     Hypothyroidism     Mumps pancreatitis     Obesity     Pancreatitis     Peptic ulceration     Seizures     caused by Compazine    Thyroid nodule     Type 2 diabetes mellitus     Visual impairment         Social History     Socioeconomic History    Marital status:    Tobacco Use    Smoking status: Never     Passive exposure: Never    Smokeless tobacco: Never   Vaping Use    Vaping Use: Never used   Substance and Sexual Activity    Alcohol use: Never    Drug use: Never    Sexual activity: Defer     Comment:  has ED from prostate cancer treatment        Allergies   Allergen Reactions    Compazine [Prochlorperazine] Seizure    Erythromycin Itching and Other (See Comments)     Balance issues      Ultram [Tramadol] Other (See Comments)     faints    Adhesive Tape Rash          Current Outpatient Medications:     albuterol sulfate  (90 Base) MCG/ACT inhaler, Inhale 2 puffs Every 4 (Four) Hours As Needed for Wheezing or Shortness of Air., Disp: 18 g, Rfl: 5    B-D ULTRAFINE III SHORT PEN 31G X 8 MM misc, Inject 1 Needle as directed., Disp: , Rfl:     CALCIUM-MAGNESIUM-ZINC PO, Take 1 tablet by mouth Daily., Disp: , Rfl:     Coenzyme Q10 (CoQ10) 100 MG capsule, Take 3 capsules by mouth Daily., Disp: , Rfl:     Cyanocobalamin (B-12) 1000 MCG tablet, Take 1 tablet by mouth Daily., Disp: , Rfl:     docusate sodium (DULCOLAX) 100 MG capsule, Take 1 capsule by mouth 2 (Two) Times a Day., Disp: , Rfl:     estradiol (ESTRACE) 0.1 MG/GM vaginal cream, Insert 2 applicators into the vagina Daily As Needed. 2-3 times wk, Disp: , Rfl:     glipizide (GLUCOTROL XL) 10 MG 24 hr tablet, Take 1 tablet by  mouth 2 (Two) Times a Day., Disp: 60 tablet, Rfl: 2    glucosamine-chondroitin 500-400 MG capsule capsule, Take 1 capsule by mouth Daily., Disp: , Rfl:     hydrOXYzine (ATARAX) 25 MG tablet, TAKE 1 TABLET BY MOUTH DAILY AS NEEDED (DIZZINESS)., Disp: 90 tablet, Rfl: 0    insulin detemir (Levemir FlexPen) 100 UNIT/ML injection, Inject 12 Units under the skin into the appropriate area as directed Daily., Disp: 3 mL, Rfl: 2    levothyroxine (SYNTHROID, LEVOTHROID) 175 MCG tablet, Take 1 tablet by mouth Every Morning. Monday, Wednesday and Friday, Disp: , Rfl:     magnesium gluconate (MAGONATE) 500 MG tablet, Take 1 tablet by mouth Daily., Disp: , Rfl:     methocarbamol (ROBAXIN) 500 MG tablet, Take 1 tablet by mouth 3 (Three) Times a Day., Disp: 90 tablet, Rfl: 0    montelukast (SINGULAIR) 10 MG tablet, Take 1 tablet by mouth Daily., Disp: , Rfl:     NON FORMULARY, SELENIUM OTC, Disp: , Rfl:     Omega-3 Fatty Acids (fish oil) 1000 MG capsule capsule, Take 1 capsule by mouth Daily With Breakfast., Disp: , Rfl:     ondansetron (ZOFRAN) 4 MG tablet, Take 1 tablet by mouth Every 8 (Eight) Hours As Needed for Nausea or Vomiting., Disp: 30 tablet, Rfl: 2    Pancrelipase, Lip-Prot-Amyl, (CREON) 59309-780278 units capsule delayed-release particles capsule, Take 1 capsule by mouth 3 (Three) Times a Day With Meals., Disp: , Rfl:     Probiotic Product (Risaquad-2) capsule capsule, Take 1 capsule by mouth Daily., Disp: , Rfl:     rizatriptan (MAXALT) 10 MG tablet, Take 1 tablet by mouth 1 (One) Time As Needed., Disp: , Rfl:     sertraline (ZOLOFT) 100 MG tablet, Take 1 tablet by mouth Daily., Disp: , Rfl:     vitamin D3 125 MCG (5000 UT) capsule capsule, Take 1 capsule by mouth Daily., Disp: , Rfl:     gabapentin (NEURONTIN) 300 MG capsule, Take 1 capsule by mouth 2 (Two) Times a Day., Disp: 60 capsule, Rfl: 0       Objective   Vital Signs:   /82 (BP Location: Left arm, Patient Position: Sitting, Cuff Size: Large Adult)    "Pulse 71   Ht 170.2 cm (67.01\")   Wt 88.9 kg (196 lb)   SpO2 98%   BMI 30.69 kg/m²       Physical Exam  Vitals and nursing note reviewed.   Constitutional:       Appearance: Normal appearance.   HENT:      Head: Normocephalic.   Eyes:      General: Lids are normal.      Extraocular Movements: Extraocular movements intact.      Pupils: Pupils are equal, round, and reactive to light.   Pulmonary:      Effort: Pulmonary effort is normal. No respiratory distress.   Skin:     General: Skin is warm and dry.   Neurological:      Mental Status: She is alert.      Deep Tendon Reflexes:      Reflex Scores:       Bicep reflexes are 2+ on the right side and 2+ on the left side.       Brachioradialis reflexes are 2+ on the right side and 2+ on the left side.       Patellar reflexes are 2+ on the right side and 0 on the left side.       Achilles reflexes are 2+ on the right side and 0 on the left side.  Psychiatric:         Speech: Speech normal.        Neurological Exam  Mental Status  Alert. Oriented to person, place, time and situation. Speech is normal. Language is fluent with no aphasia.    Cranial Nerves  CN II: Visual fields full to confrontation.  CN III, IV, VI: Extraocular movements intact bilaterally. Normal lids and orbits bilaterally. Pupils equal round and reactive to light bilaterally.  CN V: Facial sensation is normal.  CN VII: Full and symmetric facial movement.  CN IX, X: Palate elevates symmetrically. Normal gag reflex.  CN XI: Shoulder shrug strength is normal.  CN XII: Tongue midline without atrophy or fasciculations.    Motor  Normal muscle bulk throughout. No fasciculations present. Normal muscle tone. No abnormal involuntary movements.                                               Right                     Left  Elbow flexion                         5                          5  Elbow extension                    5                          5  Wrist flexion                           5                    "       5  Wrist extension                      5                          5  Supination                             5                          5  Pronation                               5                          5  Hip flexion                              5                          5  Hip extension                         5                          5  Hip abduction                         5                          5  Hip adduction                         5                          5  Knee flexion                           5                          5  Knee extension                      5                          5  Dorsiflexion                            5                          5    Sensory  Light touch abnormality: Decreased light touch to right leg compared to left. Pinprick abnormality: Abnormal pinprick to left lower leg compared to right.. Vibration is normal in upper and lower extremities. Proprioception is normal in upper and lower extremities.     Reflexes                                            Right                      Left  Brachioradialis                    2+                         2+  Biceps                                 2+                         2+  Patellar                                2+                         0  Achilles                                2+                         0    Right pathological reflexes: Ankle clonus absent.  Left pathological reflexes: Ankle clonus absent.    Gait  Casual gait: Normal stance. Reduced stride length. Antalgic gait.  Uses single point cane.      Result Review :   The following data was reviewed by: GREGORIO Guy on 02/08/2024:         Progress Notes by Jacob Espinoza APRN (12/08/2023 13:00)     EMG & Nerve Conduction Test (01/31/2024 08:51)           MRI Lumbar Spine With & Without Contrast (10/16/2023 18:53)   Study Result    Narrative & Impression   EXAM/TECHNIQUE: MRI lumbar spine without and with IV contrast     INDICATION:  Right-sided paresthesias, right lower extremity weakness     COMPARISON: None     FINDINGS:     This report assumes 5 lumbar type vertebral bodies. 8 mm anterolisthesis  of L4 on L5. No other subluxation. Lumbar spine alignment is maintained.  No significant vertebral body marrow edema. No vertebral body height  loss. Mild multilevel mixed Modic type degenerative endplate changes  greatest at L4-L5 and L5-S1. Discs are moderately desiccated throughout.  Moderate multilevel lumbar spine degenerative changes described in  further detail level by level below. The distal cord/conus appears  normal. Prevertebral soft tissues are unremarkable. Following contrast  administration, no abnormal foci of enhancement are identified. No  epidural collections. No paraspinal collection     Multilevel degenerative change:  L1-L2: Disc bulge flattens the ventral thecal sac mildly narrowing the  central canal. Facet arthropathy mildly narrows both neural foramina.     L2-L3: Disc bulge flattens the ventral thecal sac. Along with facet  arthropathy, there is mild central canal narrowing and mild bilateral  neural foraminal narrowing.     L3-L4: Small disc bulge and facet arthropathy causes mild to moderate  central canal stenosis and mild bilateral neural foraminal stenosis.     L4-L5: Small disc bulge and facet arthropathy causes mild central canal  stenosis and mild left neural foraminal stenosis. There is moderate  right neural foraminal stenosis with disc/osteophyte material closely  approximating and likely abutting the exiting right L4 nerve root.     L5-S1: Central canal is widely patent. Anterolisthesis and disc bulge  causes mild left neural foraminal stenosis and moderate right neural  foraminal stenosis. Disc/osteophyte material closely approximates and  likely abuts the exiting right L5 nerve root.     IMPRESSION:     1.  No acute osseous findings.  2.  8 mm anterolisthesis of L5 on S1.  3.  Moderate multilevel degenerative  change, as described above. No area  of severe central canal stenosis. There is moderate right sided neural  foraminal stenosis at L4-L5 and L5-S1 with disc/osteophyte material  closely approximating and likely abutting the exiting right L4 and L5  nerve roots.        This report was signed and finalized on 10/16/2023 7:56 PM CDT by Dr. Luis Chawla MD.                  Impression:  Charlene Bey is a 68 y.o. female who presents for follow-up of right leg paresthesia.  Her EMG study did show bilateral tibial motor reduced amplitudes.  Addition the right sural nerve showed prolonged distal latency.  Overall findings are of unclear significance.  This would not explain her symptoms and is also bilateral.  After evaluating patient today, I do feel it is prudent to reevaluate other etiologies.  I will to repeat her MRI of her lumbar spine as she had an anterolisthesis of L5 on S1.  Additionally she had right-sided neural vazquez stenosis at L4-L5 and L5-S1 disc/osteophyte material closely approximating likely abutting the exiting right L4 and L5 nerve roots.  Addition I will assess her hips and pelvis with plain film x-rays today.  Will go ahead and send a neurosurgery referral.    Diagnoses and all orders for this visit:    1. Paresthesia and pain of right extremity (Primary)  -     XR hip w or wo pelvis 4 view right; Future  -     XR pelvis 1 or 2 vw; Future  -     MRI Lumbar Spine With & Without Contrast; Future    2. Anterolisthesis of lumbar spine  -     MRI Lumbar Spine With & Without Contrast; Future        Plan:  MRI lumbar spine without contrast  X-ray right hip  X-ray pelvis  Referral to neurosurgery  Continue gabapentin 300 mg capsule nightly  Follow-up with PCP as scheduled send follow-up with me in 3 months or sooner if needed    The patient and I have discussed the plan of care and she is in full agreement at this time.   I spent total of 40 minutes was encounter preschools reviewing prior records,  obtaining a thorough HPI, assessment of patient, developing a plan of care with the patient, patient discussion, patient education as well as documentation.  Follow Up   Return in about 3 months (around 5/8/2024).            Jacob Espinoza, GREGORIO  02/08/24  10:03 CST

## 2024-02-09 NOTE — PROGRESS NOTES
Please let patient know her x-ray of her hip showed no acute osseous abnormality.  Continue current plan of care.

## 2024-02-16 ENCOUNTER — PATIENT MESSAGE (OUTPATIENT)
Dept: INTERNAL MEDICINE | Facility: CLINIC | Age: 69
End: 2024-02-16
Payer: COMMERCIAL

## 2024-02-16 DIAGNOSIS — Z86.59 HISTORY OF CLAUSTROPHOBIA: Primary | ICD-10-CM

## 2024-02-16 RX ORDER — DIAZEPAM 5 MG/1
5 TABLET ORAL AS NEEDED
Qty: 2 TABLET | Refills: 0 | Status: SHIPPED | OUTPATIENT
Start: 2024-02-16

## 2024-02-22 ENCOUNTER — OFFICE VISIT (OUTPATIENT)
Dept: NEUROSURGERY | Facility: CLINIC | Age: 69
End: 2024-02-22
Payer: COMMERCIAL

## 2024-02-22 ENCOUNTER — HOSPITAL ENCOUNTER (OUTPATIENT)
Dept: MRI IMAGING | Facility: HOSPITAL | Age: 69
Discharge: HOME | End: 2024-02-22
Payer: COMMERCIAL

## 2024-02-22 VITALS — HEIGHT: 67 IN | WEIGHT: 197 LBS | BODY MASS INDEX: 30.92 KG/M2

## 2024-02-22 DIAGNOSIS — M43.16 ANTEROLISTHESIS OF LUMBAR SPINE: ICD-10-CM

## 2024-02-22 DIAGNOSIS — M51.36 LUMBAR DEGENERATIVE DISC DISEASE: ICD-10-CM

## 2024-02-22 DIAGNOSIS — Z78.9 NONSMOKER: ICD-10-CM

## 2024-02-22 DIAGNOSIS — R20.2 PARESTHESIA AND PAIN OF RIGHT EXTREMITY: ICD-10-CM

## 2024-02-22 DIAGNOSIS — M79.609 PARESTHESIA AND PAIN OF RIGHT EXTREMITY: ICD-10-CM

## 2024-02-22 DIAGNOSIS — E66.09 CLASS 1 OBESITY DUE TO EXCESS CALORIES WITHOUT SERIOUS COMORBIDITY WITH BODY MASS INDEX (BMI) OF 30.0 TO 30.9 IN ADULT: ICD-10-CM

## 2024-02-22 DIAGNOSIS — M54.16 LUMBAR RADICULOPATHY: ICD-10-CM

## 2024-02-22 LAB — CREAT BLDA-MCNC: 0.8 MG/DL (ref 0.6–1.3)

## 2024-02-22 PROCEDURE — 0 GADOBENATE DIMEGLUMINE 529 MG/ML SOLUTION

## 2024-02-22 PROCEDURE — 72158 MRI LUMBAR SPINE W/O & W/DYE: CPT

## 2024-02-22 PROCEDURE — A9577 INJ MULTIHANCE: HCPCS

## 2024-02-22 PROCEDURE — 82565 ASSAY OF CREATININE: CPT

## 2024-02-22 RX ADMIN — GADOBENATE DIMEGLUMINE 20 ML: 529 INJECTION, SOLUTION INTRAVENOUS at 14:55
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
## 2024-02-22 NOTE — PATIENT INSTRUCTIONS
Advance Care Planning and Advance Directives     You make decisions on a daily basis - decisions about where you want to live, your career, your home, your life. Perhaps one of the most important decisions you face is your choice for future medical care. Take time to talk with your family and your healthcare team and start planning today.  Advance Care Planning is a process that can help you:  Understand possible future healthcare decisions in light of your own experiences  Reflect on those decision in light of your goals and values  Discuss your decisions with those closest to you and the healthcare professionals that care for you  Make a plan by creating a document that reflects your wishes    Surrogate Decision Maker  In the event of a medical emergency, which has left you unable to communicate or to make your own decisions, you would need someone to make decisions for you.  It is important to discuss your preferences for medical treatment with this person while you are in good health.     Qualities of a surrogate decision maker:  Willing to take on this role and responsibility  Knows what you want for future medical care  Willing to follow your wishes even if they don't agree with them  Able to make difficult medical decisions under stressful circumstances    Advance Directives  These are legal documents you can create that will guide your healthcare team and decision maker(s) when needed. These documents can be stored in the electronic medical record.    Living Will - a legal document to guide your care if you have a terminal condition or a serious illness and are unable to communicate. States vary by statute in document names/types, but most forms may include one or more of the following:        -  Directions regarding life-prolonging treatments        -  Directions regarding artificially provided nutrition/hydration        -  Choosing a healthcare decision maker        -  Direction regarding organ/tissue  donation    Durable Power of  for Healthcare - this document names an -in-fact to make medical decisions for you, but it may also allow this person to make personal and financial decisions for you. Please seek the advice of an  if you need this type of document.    **Advance Directives are not required and no one may discriminate against you if you do not sign one.    Medical Orders  Many states allow specific forms/orders signed by your physician to record your wishes for medical treatment in your current state of health. This form, signed in personal communication with your physician, addresses resuscitation and other medical interventions that you may or may not want.      For more information or to schedule a time with a [facility] Advance Care Planning Facilitator contact: [email or web address] or call [phone] and someone will contact you directly.

## 2024-02-23 NOTE — PROGRESS NOTES
Please let patient know her MRI did not show any significant changes since her last.  Recommend following with neurosurgery further recommendations.  Will see patient as scheduled.

## 2024-02-28 ENCOUNTER — HOSPITAL ENCOUNTER (OUTPATIENT)
Dept: MRI IMAGING | Facility: HOSPITAL | Age: 69
End: 2024-02-28
Payer: COMMERCIAL

## 2024-03-02 DIAGNOSIS — E11.65 TYPE 2 DIABETES MELLITUS WITH HYPERGLYCEMIA, WITHOUT LONG-TERM CURRENT USE OF INSULIN: ICD-10-CM

## 2024-03-02 DIAGNOSIS — E03.9 HYPOTHYROIDISM, UNSPECIFIED: ICD-10-CM

## 2024-03-04 NOTE — TELEPHONE ENCOUNTER
Rx Refill Note  Requested Prescriptions     Pending Prescriptions Disp Refills    glipizide (GLUCOTROL XL) 10 MG 24 hr tablet [Pharmacy Med Name: GLIPIZIDE ER 10 MG TABLET] 180 tablet      Sig: TAKE 1 TABLET BY MOUTH TWICE A DAY    levothyroxine (SYNTHROID, LEVOTHROID) 175 MCG tablet [Pharmacy Med Name: LEVOTHYROXINE 175 MCG TABLET] 90 tablet 1     Si TABLET IN THE MORNING ON AN EMPTY STOMACH ORALLY MONDAY, WEDNESDAY, FRIDAY 90 DAYS      Last office visit with prescribing clinician: 2023   Last telemedicine visit with prescribing clinician: Visit date not found   Next office visit with prescribing clinician: 3/18/2024                         Would you like a call back once the refill request has been completed: [] Yes [] No    If the office needs to give you a call back, can they leave a voicemail: [] Yes [] No    Maryellen Saravia RN  24, 07:36 CST

## 2024-03-05 RX ORDER — LEVOTHYROXINE SODIUM 175 UG/1
TABLET ORAL
Qty: 90 TABLET | Refills: 1 | Status: SHIPPED | OUTPATIENT
Start: 2024-03-05

## 2024-03-05 RX ORDER — GLIPIZIDE 10 MG/1
20 TABLET, FILM COATED, EXTENDED RELEASE ORAL DAILY
Qty: 180 TABLET | Refills: 1 | Status: SHIPPED | OUTPATIENT
Start: 2024-03-05

## 2024-03-11 ENCOUNTER — PRIOR AUTHORIZATION (OUTPATIENT)
Dept: INTERNAL MEDICINE | Facility: CLINIC | Age: 69
End: 2024-03-11
Payer: COMMERCIAL

## 2024-03-11 NOTE — TELEPHONE ENCOUNTER
GABRIELE TRENT (Key: [id number])  Tried to send to [employer]  Need Help? Call us at [phone]  Outcome  Additional Information Required  The member benefit does not include pharmacy drug coverage. Eligible drugs may be covered under the medical benefit.  Drug  Levemir FlexPen 100UNIT/ML pen-injectors  ePA cloud logo  Form  [employer] Electronic [employer] Form (2017 [employer])

## 2024-03-11 NOTE — TELEPHONE ENCOUNTER
[facility] faxed over a request for Lantus Solostar 100mg/mL due to insurance not covering the Levemir Flexpen.  Pended for review

## 2024-03-15 ENCOUNTER — TELEPHONE (OUTPATIENT)
Dept: INTERNAL MEDICINE | Facility: CLINIC | Age: 69
End: 2024-03-15
Payer: COMMERCIAL

## 2024-03-15 NOTE — TELEPHONE ENCOUNTER
Tried to call pt to check on her prescription.  [facility] sent a alternative request, due to insurance not covering the Levemir Flexpen.  I need to check with pt to see if she has a separate Rx plan and get that info in her chart, if she does.  If she does not, [facility] is requesting change to Lantus Solostar.

## 2024-03-18 ENCOUNTER — OFFICE VISIT (OUTPATIENT)
Dept: INTERNAL MEDICINE | Facility: CLINIC | Age: 69
End: 2024-03-18
Payer: COMMERCIAL

## 2024-03-18 VITALS
HEART RATE: 65 BPM | TEMPERATURE: 99 F | DIASTOLIC BLOOD PRESSURE: 80 MMHG | OXYGEN SATURATION: 97 % | BODY MASS INDEX: 31.39 KG/M2 | WEIGHT: 200 LBS | SYSTOLIC BLOOD PRESSURE: 131 MMHG | HEIGHT: 67 IN

## 2024-03-18 DIAGNOSIS — M54.9 MID BACK PAIN ON RIGHT SIDE: ICD-10-CM

## 2024-03-18 DIAGNOSIS — Z87.19 HISTORY OF PANCREATITIS: ICD-10-CM

## 2024-03-18 DIAGNOSIS — E11.65 TYPE 2 DIABETES MELLITUS WITH HYPERGLYCEMIA, WITH LONG-TERM CURRENT USE OF INSULIN: Primary | ICD-10-CM

## 2024-03-18 DIAGNOSIS — Z79.4 TYPE 2 DIABETES MELLITUS WITH HYPERGLYCEMIA, WITH LONG-TERM CURRENT USE OF INSULIN: Primary | ICD-10-CM

## 2024-03-18 DIAGNOSIS — M54.16 LUMBAR RADICULOPATHY: ICD-10-CM

## 2024-03-18 DIAGNOSIS — M51.36 LUMBAR DEGENERATIVE DISC DISEASE: ICD-10-CM

## 2024-03-18 LAB
EXPIRATION DATE: ABNORMAL
HBA1C MFR BLD: 7.9 % (ref 4.5–5.7)
Lab: ABNORMAL

## 2024-03-18 RX ORDER — METHYLPREDNISOLONE SODIUM SUCCINATE 40 MG/ML
60 INJECTION, POWDER, LYOPHILIZED, FOR SOLUTION INTRAMUSCULAR; INTRAVENOUS ONCE
Status: DISCONTINUED | OUTPATIENT
Start: 2024-03-18 | End: 2024-03-18

## 2024-03-18 RX ORDER — METHYLPREDNISOLONE SODIUM SUCCINATE 40 MG/ML
60 INJECTION, POWDER, LYOPHILIZED, FOR SOLUTION INTRAMUSCULAR; INTRAVENOUS ONCE
Status: COMPLETED | OUTPATIENT
Start: 2024-03-18 | End: 2024-03-18

## 2024-03-18 RX ADMIN — METHYLPREDNISOLONE SODIUM SUCCINATE 60 MG: 40 INJECTION, POWDER, LYOPHILIZED, FOR SOLUTION INTRAMUSCULAR; INTRAVENOUS at 14:00
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
## 2024-03-20 LAB
ALBUMIN SERPL-MCNC: 4.5 G/DL (ref 3.9–4.9)
ALBUMIN/GLOB SERPL: 1.7 {RATIO} (ref 1.2–2.2)
ALP SERPL-CCNC: 83 IU/L (ref 44–121)
ALT SERPL-CCNC: 19 IU/L (ref 0–32)
AMYLASE SERPL-CCNC: 37 U/L (ref 31–110)
AST SERPL-CCNC: 18 IU/L (ref 0–40)
BASOPHILS # BLD AUTO: 0 X10E3/UL (ref 0–0.2)
BASOPHILS NFR BLD AUTO: 0 %
BILIRUB SERPL-MCNC: 0.2 MG/DL (ref 0–1.2)
BUN SERPL-MCNC: 20 MG/DL (ref 8–27)
BUN/CREAT SERPL: 28 (ref 12–28)
CALCIUM SERPL-MCNC: 9.8 MG/DL (ref 8.7–10.3)
CHLORIDE SERPL-SCNC: 104 MMOL/L (ref 96–106)
CO2 SERPL-SCNC: 20 MMOL/L (ref 20–29)
CREAT SERPL-MCNC: 0.71 MG/DL (ref 0.57–1)
EGFRCR SERPLBLD CKD-EPI 2021: 93 ML/MIN/1.73
EOSINOPHIL # BLD AUTO: 0.1 X10E3/UL (ref 0–0.4)
EOSINOPHIL NFR BLD AUTO: 2 %
ERYTHROCYTE [DISTWIDTH] IN BLOOD BY AUTOMATED COUNT: 14.4 % (ref 11.7–15.4)
GLOBULIN SER CALC-MCNC: 2.7 G/DL (ref 1.5–4.5)
GLUCOSE SERPL-MCNC: 118 MG/DL (ref 70–99)
HCT VFR BLD AUTO: 42 % (ref 34–46.6)
HGB BLD-MCNC: 13.1 G/DL (ref 11.1–15.9)
IMM GRANULOCYTES # BLD AUTO: 0 X10E3/UL (ref 0–0.1)
IMM GRANULOCYTES NFR BLD AUTO: 0 %
LIPASE SERPL-CCNC: 20 U/L (ref 14–72)
LYMPHOCYTES # BLD AUTO: 2.7 X10E3/UL (ref 0.7–3.1)
LYMPHOCYTES NFR BLD AUTO: 38 %
Lab: NORMAL
MCH RBC QN AUTO: 27 PG (ref 26.6–33)
MCHC RBC AUTO-ENTMCNC: 31.2 G/DL (ref 31.5–35.7)
MCV RBC AUTO: 86 FL (ref 79–97)
MONOCYTES # BLD AUTO: 0.4 X10E3/UL (ref 0.1–0.9)
MONOCYTES NFR BLD AUTO: 6 %
NEUTROPHILS # BLD AUTO: 3.9 X10E3/UL (ref 1.4–7)
NEUTROPHILS NFR BLD AUTO: 54 %
PLATELET # BLD AUTO: 208 X10E3/UL (ref 150–450)
POTASSIUM SERPL-SCNC: 4.3 MMOL/L (ref 3.5–5.2)
PROT SERPL-MCNC: 7.2 G/DL (ref 6–8.5)
RBC # BLD AUTO: 4.86 X10E6/UL (ref 3.77–5.28)
SODIUM SERPL-SCNC: 139 MMOL/L (ref 134–144)
WBC # BLD AUTO: 7.1 X10E3/UL (ref 3.4–10.8)

## 2024-03-25 DIAGNOSIS — M79.609 PARESTHESIA AND PAIN OF RIGHT EXTREMITY: ICD-10-CM

## 2024-03-25 DIAGNOSIS — R20.2 PARESTHESIA AND PAIN OF RIGHT EXTREMITY: ICD-10-CM

## 2024-03-25 DIAGNOSIS — M62.838 MUSCLE SPASMS OF BOTH LOWER EXTREMITIES: ICD-10-CM

## 2024-03-26 RX ORDER — METHOCARBAMOL 500 MG/1
500 TABLET, FILM COATED ORAL 3 TIMES DAILY PRN
Qty: 90 TABLET | Refills: 1 | Status: SHIPPED | OUTPATIENT
Start: 2024-03-26

## 2024-03-26 RX ORDER — GABAPENTIN 300 MG/1
300 CAPSULE ORAL 2 TIMES DAILY
Qty: 180 CAPSULE | Refills: 0 | Status: SHIPPED | OUTPATIENT
Start: 2024-03-26

## 2024-03-29 ENCOUNTER — HOSPITAL ENCOUNTER (OUTPATIENT)
Dept: MAMMOGRAPHY | Facility: HOSPITAL | Age: 69
Discharge: HOME | End: 2024-03-29
Payer: COMMERCIAL

## 2024-03-29 ENCOUNTER — APPOINTMENT (OUTPATIENT)
Dept: LAB | Facility: HOSPITAL | Age: 69
End: 2024-03-29
Payer: COMMERCIAL

## 2024-03-29 DIAGNOSIS — Z12.31 SCREENING MAMMOGRAM FOR BREAST CANCER: ICD-10-CM

## 2024-03-29 PROCEDURE — 77067 SCR MAMMO BI INCL CAD: CPT

## 2024-03-29 PROCEDURE — 77063 BREAST TOMOSYNTHESIS BI: CPT

## 2024-04-20 DIAGNOSIS — E11.65 TYPE 2 DIABETES MELLITUS WITH HYPERGLYCEMIA: ICD-10-CM

## 2024-04-22 RX ORDER — PEN NEEDLE, DIABETIC 31 GX5/16"
NEEDLE, DISPOSABLE MISCELLANEOUS
Qty: 100 EACH | Refills: 2 | Status: SHIPPED | OUTPATIENT
Start: 2024-04-22

## 2024-04-22 NOTE — TELEPHONE ENCOUNTER
Pending Prescriptions:                       Disp   Refills    B-D ULTRAFINE III SHORT PEN 31G X 8 MM mis*100 ea*2        Sig: USE 1 EACH DAY AS INSTRUCTED

## 2024-05-01 ENCOUNTER — PRIOR AUTHORIZATION (OUTPATIENT)
Dept: INTERNAL MEDICINE | Facility: CLINIC | Age: 69
End: 2024-05-01
Payer: COMMERCIAL

## 2024-05-01 NOTE — TELEPHONE ENCOUNTER
GABRIELE TRENT (Key: [id number])  PA Case ID #: [id number]  Rx #: [id number]  Drug  Albuterol Sulfate  (90 Base)MCG/ACT aerosol  Approved today  Your PA request has been approved. Additional information will be provided in the approval communication. (Message [phone])

## 2024-05-02 ENCOUNTER — TELEPHONE (OUTPATIENT)
Dept: INTERNAL MEDICINE | Facility: CLINIC | Age: 69
End: 2024-05-02
Payer: COMMERCIAL

## 2024-05-02 DIAGNOSIS — E11.65 TYPE 2 DIABETES MELLITUS WITH HYPERGLYCEMIA, WITH LONG-TERM CURRENT USE OF INSULIN: Primary | ICD-10-CM

## 2024-05-02 DIAGNOSIS — Z79.4 TYPE 2 DIABETES MELLITUS WITH HYPERGLYCEMIA, WITH LONG-TERM CURRENT USE OF INSULIN: Primary | ICD-10-CM

## 2024-05-02 NOTE — TELEPHONE ENCOUNTER
Pt called with concerns that her sugar is running high.  She would like Alex call her.  Yesterday morning  230 - fasting  This morning  197 fasting    Pt also stated that on the 14th she is scheduled to have a Spinal Epideral and was wondering if she should have it due to sugar being high until it's under control?  I suggested pt call where she is having the Spinal Epideral and discuss with them.  Pt voiced understanding and said she would do that, it makes sense.

## 2024-05-02 NOTE — TELEPHONE ENCOUNTER
Spoke to pt and she states that she has increased insulin to 15 units, per your advise during last office visit.  Pain, that seemed to keep readings higher at last visit, has gotten better.  She states that she is going to be going out of town this weekend and also has an epidural scheduled soon and wants to know what she can do to try to get her blood sugar down so she doesn't take a chance of having issues with the spinal.  Per pt, fasting blood sugar yesterday morning was 230 and this morning it was 197.  Please advise.

## 2024-05-03 NOTE — TELEPHONE ENCOUNTER
Called patient and discussed blood glucose. She reported increasing her insulin to 18 units last night. With the increase woke up with her blood glucose in the 120's and has felt better today. Instructed to continue at 18 units and if elevated to increase to 20 units. Patient voiced understanding and will keep close watch on her blood glucose. Discussed continuing with epidural on 5/14/24 if blood glucose remains under control with increased insulin and if not may push out date to work on increased diabetes control.

## 2024-05-08 DIAGNOSIS — R42 DIZZINESS: ICD-10-CM

## 2024-05-08 RX ORDER — HYDROXYZINE HYDROCHLORIDE 25 MG/1
25 TABLET, FILM COATED ORAL DAILY PRN
Qty: 90 TABLET | Refills: 0 | Status: SHIPPED | OUTPATIENT
Start: 2024-05-08

## 2024-05-14 ENCOUNTER — OFFICE VISIT (OUTPATIENT)
Dept: NEUROLOGY | Facility: CLINIC | Age: 69
End: 2024-05-14
Payer: COMMERCIAL

## 2024-05-14 VITALS
OXYGEN SATURATION: 97 % | HEIGHT: 67 IN | BODY MASS INDEX: 31.39 KG/M2 | DIASTOLIC BLOOD PRESSURE: 82 MMHG | RESPIRATION RATE: 18 BRPM | SYSTOLIC BLOOD PRESSURE: 130 MMHG | WEIGHT: 200 LBS | HEART RATE: 67 BPM

## 2024-05-14 DIAGNOSIS — R20.2 PARESTHESIA AND PAIN OF RIGHT EXTREMITY: Primary | ICD-10-CM

## 2024-05-14 DIAGNOSIS — R26.89 BALANCE PROBLEM: ICD-10-CM

## 2024-05-14 DIAGNOSIS — M79.609 PARESTHESIA AND PAIN OF RIGHT EXTREMITY: Primary | ICD-10-CM

## 2024-05-14 PROCEDURE — 99214 OFFICE O/P EST MOD 30 MIN: CPT

## 2024-05-14 RX ORDER — OMEPRAZOLE 20 MG/1
20 CAPSULE, DELAYED RELEASE ORAL DAILY
COMMUNITY

## 2024-05-14 NOTE — PROGRESS NOTES
Neurology Consult Note    [facility]  [address], [address]  [place] [address]  Phone: [phone]  Fax: [phone]    Referring Provider:   No ref. provider found  Primary Care Provider:  Alex Saravia APRN    Reason for Consult:  Right leg paresthesias  Subjective      Charlene Bey presents to [facility]    History of Present Illness  68-year-old female who I last saw in clinic on 2/8/2024.  Patient is been seen for right lower extremity paresthesias.  At last visit we did repeat her MRI lumbar spine.  There is no areas of abnormal contrast-enhancement.  No significant change was seen compared to prior exam.  EMG results were nonspecific, only showed bilateral low amplitude tibial motor studies which is unclear significance.    Patient has seen neurosurgery.  She saw GREGORIO Dooley on 2/22/2024.  Reviewed that encounter does show a felt patient would benefit from pain management.  She is to follow Dr. Andrade.  Her next appointment is in June.  She was in the diagnoses of lumbar radiculopathy, lumbar degenerative disc disease.    Today she reports she is doing okay.  Some days she can walk extremely well and today she does have pain.  She does continue to take gabapentin 300 mg capsules.  She can take up to 2 capsules daily however tries to take it once daily.  She has noted continued balance issues.  Described as feeling she is walking on a boat dock.  She can run into the end of her bed chairs in her house.  She denies any falls to the ground.  Her pain and abnormal sensations are unchanged.  They are intermittent.  She was set up to see pain management and had an epidural done however her blood sugars were reportedly running high and they deferred treatment currently.  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 Her son took her to to [facility] yesterday and was found to have left IPH and SAH. MRI brain no underlying mass, CTA head ? subtle prominence of vessel in the left IPH concern for AV fistula, and concern for a punctate aneurysm patient transferred to [facility] for angio     24 hour Events:  1/14- No events. Patient's examination stable    1/15 no events    Allergies    sulfa drugs (Hives)    Intolerances    opioid-like analgesics (Vomiting)      REVIEW OF SYSTEMS: [ ] Unable to Assess due to neurologic exam   [x] All ROS addressed below are non-contributory, except:  Neuro: [x] Headache [ ] Back pain [ ] Numbness [ ] Weakness [ ] Ataxia [ ] Dizziness [ ] Aphasia [ ] Dysarthria [ ] Visual disturbance  Resp: [ ] Shortness of breath/dyspnea, [ ] Orthopnea [ ] Cough  CV: [ ] Chest pain [ ] Palpitation [ ] Lightheadedness [ ] Syncope  Renal: [ ] Thirst [ ] Edema  GI: [ ] Nausea [ ] Emesis [ ] Abdominal pain [ ] Constipation [ ] Diarrhea  Hem: [ ] Hematemesis [ ] bright red blood per rectum  ID: [ ] Fever [ ] Chills [ ] Dysuria  ENT: [ ] Rhinorrhea    ICU Vital Signs Last 24 Hrs  T(C): 36.8 (16 Jan 2024 11:00), Max: 37.1 (15 Driss 2024 15:00)  T(F): 98.3 (16 Jan 2024 11:00), Max: 98.8 (15 Driss 2024 15:00)  HR: 72 (16 Jan 2024 11:00) (64 - 100)  BP: --  BP(mean): --  ABP: 132/59 (16 Jan 2024 11:00) (107/48 - 167/90)  ABP(mean): 87 (16 Jan 2024 11:00) (70 - 121)  RR: 15 (16 Jan 2024 11:00) (14 - 24)  SpO2: 100% (16 Jan 2024 11:00) (94% - 100%)    O2 Parameters below as of 16 Jan 2024 07:00  Patient On (Oxygen Delivery Method): room air    CBC:            12.7   11.25 )-----------( 242      ( 01-16-24 @ 00:27 )             38.1         Chem:         ( 01-16-24 @ 00:27 )    138  |  105  |  14  ----------------------------<  104<H>  3.8   |  22  |  0.81        Liver Functions:     Type & Screen: ( 01-16-24 @ 00:16 )    ABO/Rh/Amarilis:  O Negative       MEDICATIONS  (STANDING):  atorvastatin 20 milliGRAM(s) Oral at bedtime  chlorhexidine 4% Liquid 1 Application(s) Topical daily  DULoxetine 60 milliGRAM(s) Oral daily  famotidine    Tablet 20 milliGRAM(s) Oral <User Schedule>  levETIRAcetam 500 milliGRAM(s) Oral two times a day  levothyroxine 50 MICROGram(s) Oral daily  metoprolol tartrate 12.5 milliGRAM(s) Oral every 12 hours  niMODipine Oral Solution 30 milliGRAM(s) Oral every 2 hours  sodium chloride 0.9%. 1000 milliLiter(s) (75 mL/Hr) IV Continuous <Continuous>    MEDICATIONS  (PRN):  acetaminophen     Tablet .. 650 milliGRAM(s) Oral every 6 hours PRN Moderate Pain (4 - 6)  aluminum hydroxide/magnesium hydroxide/simethicone Suspension 30 milliLiter(s) Oral every 4 hours PRN Dyspepsia  loperamide 4 milliGRAM(s) Oral daily PRN Diarrhea      PHYSICAL EXAM:    Constitutional: No Acute Distress     Neurological: Awake, alert oriented to person, place and time, Following Commands, PERRL, EOMI, No Gaze Preference, Face Symmetrical, Speech Fluent, No dysmetria, No ataxia, No nystagmus     Motor exam:          Upper extremity                         Delt     Bicep     Tricep    HG                                                 R         5/5        5/5        5/5       5/5                                               L          5/5        5/5        5/5       5/5          Lower extremity                        HF         KF        KE       DF         PF                                                  R        5/5        5/5        5/5       5/5         5/5                                               L         5/5        5/5       5/5       5/5          5/5                                                 Sensation: [x] intact to light touch  [ ] decreased:     Pulmonary: Clear to Auscultation, No rales, No rhonchi, No wheezes     Cardiovascular: S1, S2, Regular rate and rhythm     Gastrointestinal: Soft, Non-tender, Non-distended     Extremities: No calf tenderness     Incision:    (acquired)    Chronic pancreatitis    Nonintractable migraine    Dizziness, nonspecific    Mixed hyperlipidemia    Change in bowel habits    History of colon polyps        Past Medical History:   Diagnosis Date    Anemia     hx of anemia    Anxiety     Arthritis     Asthma     Cancer     carcinoma removed over right eye    Cataract     Removed-Right in 2019 and Left in 2017    Cholelithiasis     Removed in 2014    Claustrophobia     Cystocele with rectocele     GERD (gastroesophageal reflux disease)     Goiter     Hashimoto's thyroiditis     Headache     HL (hearing loss)     age related    Hyperlipidemia     Hypothyroidism     Low back pain     Mumps pancreatitis     Obesity     Pancreatitis     Peptic ulceration     Peripheral neuropathy     Seizures     caused by Compazine    Thyroid nodule     Type 2 diabetes mellitus     Visual impairment         Social History     Socioeconomic History    Marital status:    Tobacco Use    Smoking status: Never     Passive exposure: Never    Smokeless tobacco: Never   Vaping Use    Vaping status: Never Used   Substance and Sexual Activity    Alcohol use: Never    Drug use: Never    Sexual activity: Not Currently     Partners: Male     Birth control/protection: Post-menopausal     Comment:  has ED from prostate cancer treatment        Allergies   Allergen Reactions    Compazine [Prochlorperazine] Seizure    Erythromycin Itching and Other (See Comments)     Balance issues      Ultram [Tramadol] Other (See Comments)     faints    Adhesive Tape Rash          Current Outpatient Medications:     albuterol sulfate  (90 Base) MCG/ACT inhaler, Inhale 2 puffs Every 4 (Four) Hours As Needed for Wheezing or Shortness of Air., Disp: 18 g, Rfl: 5    B-D ULTRAFINE III SHORT PEN 31G X 8 MM misc, USE 1 EACH DAY AS INSTRUCTED, Disp: 100 each, Rfl: 2    CALCIUM-MAGNESIUM-ZINC PO, Take 1 tablet by mouth Daily., Disp: , Rfl:     Coenzyme Q10 (CoQ10) 100 MG capsule, Take 3  HPI:  71 yo woman with PMH of HTN, HLD, hypothyroidism, had severe sudden headache on Saturday while [employer]. Her son took her to to [facility] yesterday and was found to have left IPH and SAH. MRI brain no underlying mass, CTA head ? subtle prominence of vessel in the left IPH concern for AV fistula, and concern for a punctate aneurysm patient transferred to [facility] for angio     24 hour Events:  1/14- No events. Patient's examination stable    1/15 no events    Allergies    sulfa drugs (Hives)    Intolerances    opioid-like analgesics (Vomiting)      REVIEW OF SYSTEMS: [ ] Unable to Assess due to neurologic exam   [x] All ROS addressed below are non-contributory, except:  Neuro: [x] Headache [ ] Back pain [ ] Numbness [ ] Weakness [ ] Ataxia [ ] Dizziness [ ] Aphasia [ ] Dysarthria [ ] Visual disturbance  Resp: [ ] Shortness of breath/dyspnea, [ ] Orthopnea [ ] Cough  CV: [ ] Chest pain [ ] Palpitation [ ] Lightheadedness [ ] Syncope  Renal: [ ] Thirst [ ] Edema  GI: [ ] Nausea [ ] Emesis [ ] Abdominal pain [ ] Constipation [ ] Diarrhea  Hem: [ ] Hematemesis [ ] bright red blood per rectum  ID: [ ] Fever [ ] Chills [ ] Dysuria  ENT: [ ] Rhinorrhea    ICU Vital Signs Last 24 Hrs  T(C): 36.8 (16 Jan 2024 11:00), Max: 37.1 (15 Driss 2024 15:00)  T(F): 98.3 (16 Jan 2024 11:00), Max: 98.8 (15 Driss 2024 15:00)  HR: 72 (16 Jan 2024 11:00) (64 - 100)  BP: --  BP(mean): --  ABP: 132/59 (16 Jan 2024 11:00) (107/48 - 167/90)  ABP(mean): 87 (16 Jan 2024 11:00) (70 - 121)  RR: 15 (16 Jan 2024 11:00) (14 - 24)  SpO2: 100% (16 Jan 2024 11:00) (94% - 100%)    O2 Parameters below as of 16 Jan 2024 07:00  Patient On (Oxygen Delivery Method): room air    CBC:            12.7   11.25 )-----------( 242      ( 01-16-24 @ 00:27 )             38.1         Chem:         ( 01-16-24 @ 00:27 )    138  |  105  |  14  ----------------------------<  104<H>  3.8   |  22  |  0.81        Liver Functions:     Type & Screen: ( 01-16-24 @ 00:16 )    ABO/Rh/Amarilis:  O Negative       MEDICATIONS  (STANDING):  atorvastatin 20 milliGRAM(s) Oral at bedtime  chlorhexidine 4% Liquid 1 Application(s) Topical daily  DULoxetine 60 milliGRAM(s) Oral daily  famotidine    Tablet 20 milliGRAM(s) Oral <User Schedule>  levETIRAcetam 500 milliGRAM(s) Oral two times a day  levothyroxine 50 MICROGram(s) Oral daily  metoprolol tartrate 12.5 milliGRAM(s) Oral every 12 hours  niMODipine Oral Solution 30 milliGRAM(s) Oral every 2 hours  sodium chloride 0.9%. 1000 milliLiter(s) (75 mL/Hr) IV Continuous <Continuous>    MEDICATIONS  (PRN):  acetaminophen     Tablet .. 650 milliGRAM(s) Oral every 6 hours PRN Moderate Pain (4 - 6)  aluminum hydroxide/magnesium hydroxide/simethicone Suspension 30 milliLiter(s) Oral every 4 hours PRN Dyspepsia  loperamide 4 milliGRAM(s) Oral daily PRN Diarrhea      PHYSICAL EXAM:    Constitutional: No Acute Distress     Neurological: Awake, alert oriented to person, place and time, Following Commands, PERRL, EOMI, No Gaze Preference, Face Symmetrical, Speech Fluent, No dysmetria, No ataxia, No nystagmus     Motor exam:          Upper extremity                         Delt     Bicep     Tricep    HG                                                 R         5/5        5/5        5/5       5/5                                               L          5/5        5/5        5/5       5/5          Lower extremity                        HF         KF        KE       DF         PF                                                  R        5/5        5/5        5/5       5/5         5/5                                               L         5/5        5/5       5/5       5/5          5/5                                                 Sensation: [x] intact to light touch  [ ] decreased:     Pulmonary: Clear to Auscultation, No rales, No rhonchi, No wheezes     Cardiovascular: S1, S2, Regular rate and rhythm     Gastrointestinal: Soft, Non-tender, Non-distended     Extremities: No calf tenderness     Incision:    capsules by mouth Daily., Disp: , Rfl:     Cyanocobalamin (B-12) 1000 MCG tablet, Take 1 tablet by mouth Daily., Disp: , Rfl:     diazePAM (Valium) 5 MG tablet, Take 1 tablet by mouth As Needed for Anxiety (Take 1 tablet one hour prior to MRI. May repeat dose after one hour if needed.)., Disp: 2 tablet, Rfl: 0    estradiol (ESTRACE) 0.1 MG/GM vaginal cream, Insert 2 g into the vagina Daily As Needed. 2-3 times wk, Disp: , Rfl:     gabapentin (NEURONTIN) 300 MG capsule, Take 1 capsule by mouth 2 (Two) Times a Day., Disp: 180 capsule, Rfl: 0    glipizide (GLUCOTROL XL) 10 MG 24 hr tablet, Take 2 tablets by mouth Daily., Disp: 180 tablet, Rfl: 1    glucosamine-chondroitin 500-400 MG capsule capsule, Take 1 capsule by mouth Daily., Disp: , Rfl:     hydrOXYzine (ATARAX) 25 MG tablet, TAKE 1 TABLET BY MOUTH DAILY AS NEEDED (DIZZINESS)., Disp: 90 tablet, Rfl: 0    Insulin Glargine (LANTUS SOLOSTAR) 100 UNIT/ML injection pen, Inject 18 Units under the skin into the appropriate area as directed Every Night. May increase by increments of 2 units as needed for increased fasting glucose control. (Patient taking differently: Inject 20 Units under the skin into the appropriate area as directed Every Night. May increase by increments of 2 units as needed for increased fasting glucose control.), Disp: 6 mL, Rfl: 2    levothyroxine (SYNTHROID, LEVOTHROID) 175 MCG tablet, 1 TABLET IN THE MORNING ON AN EMPTY STOMACH ORALLY MONDAY, WEDNESDAY, FRIDAY 90 DAYS, Disp: 90 tablet, Rfl: 1    magnesium gluconate (MAGONATE) 500 MG tablet, Take 1 tablet by mouth Daily., Disp: , Rfl:     methocarbamol (ROBAXIN) 500 MG tablet, Take 1 tablet by mouth 3 (Three) Times a Day As Needed for Muscle Spasms., Disp: 90 tablet, Rfl: 1    montelukast (SINGULAIR) 10 MG tablet, Take 1 tablet by mouth Daily., Disp: , Rfl:     NON FORMULARY, SELENIUM OTC, Disp: , Rfl:     Omega-3 Fatty Acids (fish oil) 1000 MG capsule capsule, Take 1 capsule by mouth Daily With  "Breakfast., Disp: , Rfl:     omeprazole (priLOSEC) 20 MG capsule, Take 1 capsule by mouth Daily., Disp: , Rfl:     ondansetron (ZOFRAN) 4 MG tablet, Take 1 tablet by mouth Every 8 (Eight) Hours As Needed for Nausea or Vomiting., Disp: 30 tablet, Rfl: 2    Pancrelipase, Lip-Prot-Amyl, (CREON) 24807-124718 units capsule delayed-release particles capsule, Take 1 capsule by mouth 3 (Three) Times a Day With Meals., Disp: , Rfl:     Probiotic Product (Risaquad-2) capsule capsule, Take 1 capsule by mouth Daily., Disp: , Rfl:     rizatriptan (MAXALT) 10 MG tablet, Take 1 tablet by mouth 1 (One) Time As Needed., Disp: , Rfl:     sertraline (ZOLOFT) 100 MG tablet, Take 1 tablet by mouth Daily., Disp: , Rfl:     vitamin D3 125 MCG (5000 UT) capsule capsule, Take 1 capsule by mouth Daily., Disp: , Rfl:     docusate sodium (DULCOLAX) 100 MG capsule, Take 1 capsule by mouth 2 (Two) Times a Day. (Patient not taking: Reported on 5/14/2024), Disp: , Rfl:        Objective   Vital Signs:   /82 (BP Location: Right arm, Patient Position: Sitting, Cuff Size: Large Adult)   Pulse 67   Resp 18   Ht 170.2 cm (67\")   Wt 90.7 kg (200 lb)   SpO2 97%   BMI 31.32 kg/m²       Physical Exam  Vitals and nursing note reviewed.   Constitutional:       Appearance: Normal appearance.   HENT:      Head: Normocephalic.   Eyes:      General: Lids are normal.      Extraocular Movements: Extraocular movements intact.      Pupils: Pupils are equal, round, and reactive to light.   Pulmonary:      Effort: Pulmonary effort is normal. No respiratory distress.   Skin:     General: Skin is warm and dry.   Neurological:      Mental Status: She is alert.      Motor: Motor strength is normal.     Deep Tendon Reflexes: Reflexes are normal and symmetric.   Psychiatric:         Speech: Speech normal.        Neurological Exam  Mental Status  Alert. Oriented to person, place, time and situation. Speech is normal. Language is fluent with no aphasia.    Cranial " Nerves  CN II: Visual fields full to confrontation.  CN III, IV, VI: Extraocular movements intact bilaterally. Normal lids and orbits bilaterally. Pupils equal round and reactive to light bilaterally.  CN V: Facial sensation is normal.  CN VII: Full and symmetric facial movement.  CN IX, X: Palate elevates symmetrically. Normal gag reflex.  CN XI: Shoulder shrug strength is normal.  CN XII: Tongue midline without atrophy or fasciculations.    Motor  Normal muscle bulk throughout. No fasciculations present. Normal muscle tone. No abnormal involuntary movements. Strength is 5/5 throughout all four extremities.    Sensory  Light touch is normal in upper and lower extremities.     Reflexes  Deep tendon reflexes are 2+ and symmetric in all four extremities.    Right pathological reflexes: Ankle clonus absent.  Left pathological reflexes: Ankle clonus absent.  Vestibular ocular reflex not preserved..    Coordination  Right: Heel-to-shin normal.Left: Heel-to-shin normal.    Gait  Casual gait: Normal stance. Normal stride length. Antalgic gait. Normal right arm swing. Normal left arm swing.  Uses single-point cane.      Result Review :   The following data was reviewed by: GREGORIO Guy on 05/14/2024:       Office Visit with Jacob Espinoza APRN (02/08/2024)     Office Visit with Alex Perdue APRN (02/22/2024)     EMG & Nerve Conduction Test (01/31/2024 08:51)       XR Hip With or Without Pelvis 4 View Right (02/08/2024 10:36)  XR Pelvis 1 or 2 View (02/09/2024 09:21)  MRI Lumbar Spine With & Without Contrast (02/22/2024 14:55)                 Impression:  Charlene Bey is a 68 y.o. female who presents for follow-up of right leg paresthesias.  EMG does not reveal any signs of neuropathy.  With her complaints of low back pain and lumbar spine abnormalities on MRI, for like her symptoms are likely due to a lumbar disc issue.  Additionally her balance issues she does have exam findings consistent with a  vertigo.  I do think patient benefit from a vestibular redilatation program.  We will send patient for this.  Pending results may consider ENT referral.  I do feel patient symptoms are less like related to a neurological cause.  Could be a combination of low back pain and vertigo.    Diagnoses and all orders for this visit:    1. Paresthesia and pain of right extremity (Primary)    2. Balance problem  -     Ambulatory Referral to Physical Therapy for Evaluation & Treatment        Plan:  Continue gabapentin per PCP  Referral to vestibular rehab  Fall precautions  Follow-up neurosurgery as scheduled  Follow-up in our clinic in 3 months or sooner if needed    The patient and I have discussed the plan of care and she is in full agreement at this time.     Follow Up   Return in about 3 months (around 8/14/2024).            GREGORIO Guy  05/14/24  10:14 CDT

## 2024-05-23 DIAGNOSIS — M62.838 MUSCLE SPASMS OF BOTH LOWER EXTREMITIES: ICD-10-CM

## 2024-05-23 RX ORDER — METHOCARBAMOL 500 MG/1
500 TABLET, FILM COATED ORAL 3 TIMES DAILY PRN
Qty: 90 TABLET | Refills: 11 | Status: SHIPPED | OUTPATIENT
Start: 2024-05-23

## 2024-05-23 NOTE — TELEPHONE ENCOUNTER
[facility] patient     Rx Refill Note  Requested Prescriptions     Pending Prescriptions Disp Refills    methocarbamol (ROBAXIN) 500 MG tablet [Pharmacy Med Name: METHOCARBAMOL 500 MG TABLET] 90 tablet 1     Sig: TAKE 1 TABLET BY MOUTH 3 TIMES A DAY AS NEEDED FOR MUSCLE SPASMS.      Last office visit with prescribing clinician: 3/18/2024   Last telemedicine visit with prescribing clinician: Visit date not found   Next office visit with prescribing clinician: 6/17/2024                         Would you like a call back once the refill request has been completed: [] Yes [] No    If the office needs to give you a call back, can they leave a voicemail: [] Yes [] No    Maryellen Saravia RN  05/23/24, 09:13 CDT

## 2024-05-29 RX ORDER — SERTRALINE HYDROCHLORIDE 100 MG/1
100 TABLET, FILM COATED ORAL DAILY
Qty: 90 TABLET | Refills: 3 | Status: SHIPPED | OUTPATIENT
Start: 2024-05-29

## 2024-05-29 NOTE — TELEPHONE ENCOUNTER
Rx Refill Note  Requested Prescriptions     Pending Prescriptions Disp Refills    sertraline (ZOLOFT) 100 MG tablet       Sig: Take 1 tablet by mouth Daily.      Last office visit with prescribing clinician: 3/18/2024   Last telemedicine visit with prescribing clinician: Visit date not found   Next office visit with prescribing clinician: 6/17/2024                         Would you like a call back once the refill request has been completed: [] Yes [] No    If the office needs to give you a call back, can they leave a voicemail: [] Yes [] No    Maryellen Saravia RN  05/29/24, 10:08 CDT

## 2024-06-04 ENCOUNTER — OFFICE VISIT (OUTPATIENT)
Dept: NEUROSURGERY | Facility: CLINIC | Age: 69
End: 2024-06-04
Payer: COMMERCIAL

## 2024-06-04 VITALS — WEIGHT: 197 LBS | BODY MASS INDEX: 30.92 KG/M2 | HEIGHT: 67 IN

## 2024-06-04 DIAGNOSIS — M51.36 LUMBAR DEGENERATIVE DISC DISEASE: ICD-10-CM

## 2024-06-04 DIAGNOSIS — M54.16 LUMBAR RADICULOPATHY: Primary | ICD-10-CM

## 2024-06-04 DIAGNOSIS — E66.09 CLASS 1 OBESITY DUE TO EXCESS CALORIES WITHOUT SERIOUS COMORBIDITY WITH BODY MASS INDEX (BMI) OF 30.0 TO 30.9 IN ADULT: ICD-10-CM

## 2024-06-04 DIAGNOSIS — Z78.9 NONSMOKER: ICD-10-CM

## 2024-06-04 PROCEDURE — 99213 OFFICE O/P EST LOW 20 MIN: CPT | Performed by: NEUROLOGICAL SURGERY

## 2024-06-04 NOTE — PROGRESS NOTES
SUBJECTIVE:  Patient ID: Charlene Bey is a 68 y.o. female is here today for follow-up.    Chief Complaint: Back pain  Chief Complaint   Patient presents with    LUMBAR RADICULOPATHY     Patient is here for follow up today to discuss all her symptoms & test results to see if there is something that Dr Andrade can offer her to help her.  She continues to complain of constant back & RLE pain/N/T.  She is using a cane today.  She states all of her symptoms began after having COVID in 10/2023.  She has completed therapy @ [facility] for weakness/dizziness.    Imaging/EMG @ [facility]       HPI  68-year-old female with multiple nonspecific complaints.  She complains of across the back low back pain which is mostly constant.  It is more right paraspinal than left.  She also describes right lower extremity radicular pain along with numbness and tingling.  She does describe some left-sided numbness and tingling but this is just started and is only occasionally.  She is 90% back pain and 10% right leg pain.  She also complains of balance issues and has been using a walker or a cane at times because of this.  She has done a dedicated course of physical therapy and has done home therapy.  She takes the occasional gabapentin.  She met with pain management but they felt that her blood sugars were not well-controlled and has not had any injections yet.    The following portions of the patient's history were reviewed and updated as appropriate: allergies, current medications, past family history, past medical history, past social history, past surgical history and problem list.    OBJECTIVE:    Review of Systems   Musculoskeletal:  Positive for back pain.   Neurological:  Positive for dizziness and numbness.   All other systems reviewed and are negative.         Physical Exam  Eyes:      Extraocular Movements: EOM normal.      Pupils: Pupils are equal, round, and reactive to light.   Neurological:      Mental Status: She is oriented to  person, place, and time.      Motor: Motor strength is normal.     Coordination: Finger-Nose-Finger Test normal.      Gait: Gait is intact.   Psychiatric:         Speech: Speech normal.         Neurologic Exam     Mental Status   Oriented to person, place, and time.   Attention: normal.   Speech: speech is normal   Level of consciousness: alert  Knowledge: good.     Cranial Nerves     CN II   Visual fields full to confrontation.     CN III, IV, VI   Pupils are equal, round, and reactive to light.  Extraocular motions are normal.     CN V   Facial sensation intact.     CN VII   Facial expression full, symmetric.     CN VIII   CN VIII normal.     CN IX, X   CN IX normal.   CN X normal.     CN XI   CN XI normal.     CN XII   CN XII normal.     Motor Exam   Muscle bulk: normal  Overall muscle tone: normal  Right arm pronator drift: absent  Left arm pronator drift: absent    Strength   Strength 5/5 throughout.     Sensory Exam   Light touch normal.   Pinprick normal.     Gait, Coordination, and Reflexes     Gait  Gait: normal    Coordination   Finger to nose coordination: normal    Tremor   Resting tremor: absent  Intention tremor: absent  Action tremor: absent    Reflexes   Reflexes 2+ except as noted.       Independent Review of Radiographic Studies:           EMG nerve conduction study does not demonstrate any specific neurologic issue.    ASSESSMENT/PLAN:  Patient complains of back pain and some right lower extremity radicular pain.  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  I would recommend she exhaust all nonsurgical options before considering surgery.  Given the multitude of her complaints I was very clear with her that any consideration of surgery would be a rather significant recovery for her and would only address a small portion of her complaints.      1. Lumbar radiculopathy    2. Lumbar degenerative disc disease    3. Nonsmoker    4.  Class 1 obesity due to excess calories without serious comorbidity with body mass index (BMI) of 30.0 to 30.9 in adult        The patient's Body mass index is 30.85 kg/m².. BMI is above normal parameters. Recommendations include: educational material    Return in about 3 months (around 9/4/2024) for FOLLOW UP AFTER PAIN MGMT - MARI Andrade MD

## 2024-06-04 NOTE — PATIENT INSTRUCTIONS
"PATIENT TO CONTINUE TO FOLLOW UP WITH HER PRIMARY CARE PROVIDER FOR YEARLY PHYSICAL EXAMS TO ENSURE COMPLETE HEALTH MAINTENANCE      BMI for Adults  Body mass index (BMI) is a number found using a person's weight and height. BMI can help tell how much of a person's weight is made up of fat. BMI does not measure body fat directly. It is used instead of tests that directly measure body fat, which can be difficult and expensive.  What are BMI measurements used for?  BMI is useful to:  Find out if your weight puts you at higher risk for medical problems.  Help recommend changes, such as in diet and exercise. This can help you reach a healthy weight. BMI screening can be done again to see if these changes are working.  How is BMI calculated?  Your height and weight are measured. The BMI is found from those numbers. This can be done with [place] or metric measurements. Note that charts and online BMI calculators are available to help you find your BMI quickly and easily without doing these calculations.  To calculate your BMI in [place] measurements:  Measure your weight in pounds (lb).  Multiply the number of pounds by 703.  So, for an adult who weighs 150 lb, multiply that number by 703: 150 x 703, which equals 105,450.  Measure your height in inches. Then multiply that number by itself to get a measurement called \"inches squared.\"  So, for an adult who is 70 inches tall, the \"inches squared\" measurement is 70 inches x 70 inches, which equals 4,900 inches squared.  Divide the total from step 2 (number of lb x 703) by the total from step 3 (inches squared): 105,450 ÷ 4,900 = 21.5. This is your BMI.  To calculate your BMI in metric measurements:    Measure your weight in kilograms (kg).  For this example, the weight is 70 kg.  Measure your height in meters (m). Then multiply that number by itself to get a measurement called \"meters squared.\"  So, for an adult who is 1.75 m tall, the \"meters squared\" measurement is 1.75 m x 1.75 " m, which equals 3.1 meters squared.  Divide the number of kilograms (your weight) by the meters squared number. In this example: 70 ÷ 3.1 = 22.6. This is your BMI.  What do the results mean?  BMI charts are used to see if you are underweight, normal weight, overweight, or obese. The following guidelines will be used:  Underweight: BMI less than 18.5.  Normal weight: BMI between 18.5 and 24.9.  Overweight: BMI between 25 and 29.9.  Obese: BMI of 30 or above.  BMI is a tool and cannot diagnose a condition. Talk with your health care provider about what your BMI means for you. Keep these notes in mind:  Weight includes fat and muscle. Someone with a muscular build, such as an athlete, may have a BMI that is higher than 24.9. In cases like these, BMI is not a correct measure of body fat.  If you have a BMI of 25 or higher, your provider may need to do more testing to find out if excess body fat is the cause.  BMI is measured the same way for males and females. Females usually have more body fat than males of the same height and weight.  Where to find more information  For more information about BMI, including tools to quickly find your BMI, go to:  Centers for Disease Control and Prevention: cdc.gov  American Heart Association: heart.org  National Heart, Lung, and Blood [place]: nhlbi.nih.gov  This information is not intended to replace advice given to you by your health care provider. Make sure you discuss any questions you have with your health care provider.  Document Revised: 09/07/2023 Document Reviewed: 08/31/2023  [employer] Patient Education © 2024 [employer]  DASH Eating Plan  DASH stands for Dietary Approaches to Stop Hypertension. The DASH eating plan is a healthy eating plan that has been shown to:  Lower high blood pressure (hypertension).  Reduce your risk for type 2 diabetes, heart disease, and stroke.  Help with weight loss.  What are tips for following this plan?  Reading food labels  Check food  "labels for the amount of salt (sodium) per serving. Choose foods with less than 5 percent of the Daily Value (DV) of sodium. In general, foods with less than 300 milligrams (mg) of sodium per serving fit into this eating plan.  To find whole grains, look for the word \"whole\" as the first word in the ingredient list.  Shopping  Buy products labeled as \"low-sodium\" or \"no salt added.\"  Buy fresh foods. Avoid canned foods and pre-made or frozen meals.  Cooking  Try not to add salt when you cook. Use salt-free seasonings or herbs instead of table salt or sea salt. Check with your health care provider or pharmacist before using salt substitutes.  Do not reid foods. Cook foods in healthy ways, such as baking, boiling, grilling, roasting, or broiling.  Cook using oils that are good for your heart. These include olive, canola, avocado, soybean, and sunflower oil.  Meal planning    Eat a balanced diet. This should include:  4 or more servings of fruits and 4 or more servings of vegetables each day. Try to fill half of your plate with fruits and vegetables.  6-8 servings of whole grains each day.  6 or less servings of lean meat, poultry, or fish each day. 1 oz is 1 serving. A 3 oz (85 g) serving of meat is about the same size as the palm of your hand. One egg is 1 oz (28 g).  2-3 servings of low-fat dairy each day. One serving is 1 cup (237 mL).  1 serving of nuts, seeds, or beans 5 times each week.  2-3 servings of heart-healthy fats. Healthy fats called omega-3 fatty acids are found in foods such as walnuts, flaxseeds, fortified milks, and eggs. These fats are also found in cold-water fish, such as sardines, salmon, and mackerel.  Limit how much you eat of:  Canned or prepackaged foods.  Food that is high in trans fat, such as fried foods.  Food that is high in saturated fat, such as fatty meat.  Desserts and other sweets, sugary drinks, and other foods with added sugar.  Full-fat dairy products.  Do not salt foods before " eating.  Do not eat more than 4 egg yolks a week.  Try to eat at least 2 vegetarian meals a week.  Eat more home-cooked food and less restaurant, buffet, and fast food.  Lifestyle  When eating at a restaurant, ask if your food can be made with less salt or no salt.  If you drink alcohol:  Limit how much you have to:  0-1 drink a day if you are female.  0-2 drinks a day if you are male.  Know how much alcohol is in your drink. In the [place] one drink is one 12 oz bottle of beer (355 mL), one 5 oz glass of wine (148 mL), or one 1½ oz glass of hard liquor (44 mL).  General information  Avoid eating more than 2,300 mg of salt a day. If you have hypertension, you may need to reduce your sodium intake to 1,500 mg a day.  Work with your provider to stay at a healthy body weight or lose weight. Ask what the best weight range is for you.  On most days of the week, get at least 30 minutes of exercise that causes your heart to beat faster. This may include walking, swimming, or biking.  Work with your provider or dietitian to adjust your eating plan to meet your specific calorie needs.  What foods should I eat?  Fruits  All fresh, dried, or frozen fruit. Canned fruits that are in their natural juice and do not have sugar added to them.  Vegetables  Fresh or frozen vegetables that are raw, steamed, roasted, or grilled. Low-sodium or reduced-sodium tomato and vegetable juice. Low-sodium or reduced-sodium tomato sauce and tomato paste. Low-sodium or reduced-sodium canned vegetables.  Grains  Whole-grain or whole-wheat bread. Whole-grain or whole-wheat pasta. Brown rice. Oatmeal. Quinoa. Bulgur. Whole-grain and low-sodium cereals. Ya bread. Low-fat, low-sodium crackers. Whole-wheat flour tortillas.  Meats and other proteins  Skinless chicken or turkey. Ground chicken or turkey. Pork with fat trimmed off. Fish and seafood. Egg whites. Dried beans, peas, or lentils. Unsalted nuts, nut butters, and seeds. Unsalted canned beans. Lean  cuts of beef with fat trimmed off. Low-sodium, lean precooked or cured meat, such as sausages or meat loaves.  Dairy  Low-fat (1%) or fat-free (skim) milk. Reduced-fat, low-fat, or fat-free cheeses. Nonfat, low-sodium ricotta or cottage cheese. Low-fat or nonfat yogurt. Low-fat, low-sodium cheese.  Fats and oils  Soft margarine without trans fats. Vegetable oil. Reduced-fat, low-fat, or light mayonnaise and salad dressings (reduced-sodium). Canola, safflower, olive, avocado, soybean, and sunflower oils. Avocado.  Seasonings and condiments  Herbs. Spices. Seasoning mixes without salt.  Other foods  Unsalted popcorn and pretzels. Fat-free sweets.  The items listed above may not be all the foods and drinks you can have. Talk to a dietitian to learn more.  What foods should I avoid?  Fruits  Canned fruit in a light or heavy syrup. Fried fruit. Fruit in cream or butter sauce.  Vegetables  Creamed or fried vegetables. Vegetables in a cheese sauce. Regular canned vegetables that are not marked as low-sodium or reduced-sodium. Regular canned tomato sauce and paste that are not marked as low-sodium or reduced-sodium. Regular tomato and vegetable juices that are not marked as low-sodium or reduced-sodium. Pickles. Olives.  Grains  Baked goods made with fat, such as croissants, muffins, or some breads. Dry pasta or rice meal packs.  Meats and other proteins  Fatty cuts of meat. Ribs. Fried meat. Guerrero. Bologna, salami, and other precooked or cured meats, such as sausages or meat loaves, that are not lean and low in sodium. Fat from the back of a pig (fatback). Bratwurst. Salted nuts and seeds. Canned beans with added salt. Canned or smoked fish. Whole eggs or egg yolks. Chicken or turkey with skin.  Dairy  Whole or 2% milk, cream, and half-and-half. Whole or full-fat cream cheese. Whole-fat or sweetened yogurt. Full-fat cheese. Nondairy creamers. Whipped toppings. Processed cheese and cheese spreads.  Fats and oils  Butter.  Stick margarine. Lard. Shortening. Ghee. Guerrero fat. Tropical oils, such as coconut, palm kernel, or palm oil.  Seasonings and condiments  Onion salt, garlic salt, seasoned salt, table salt, and sea salt. Worcestershire sauce. Tartar sauce. Barbecue sauce. Teriyaki sauce. Soy sauce, including reduced-sodium soy sauce. Steak sauce. Canned and packaged gravies. Fish sauce. Oyster sauce. Cocktail sauce. Store-bought horseradish. Ketchup. Mustard. Meat flavorings and tenderizers. Bouillon cubes. Hot sauces. Pre-made or packaged marinades. Pre-made or packaged taco seasonings. Relishes. Regular salad dressings.  Other foods  Salted popcorn and pretzels.  The items listed above may not be all the foods and drinks you should avoid. Talk to a dietitian to learn more.  Where to find more information  National Heart, Lung, and Blood [place] (NHLBI): nhlbi.nih.gov  American Heart Association (AHA): heart.org  Academy of Nutrition and Dietetics: eatright.org  National Kidney Foundation (NKF): kidney.org  This information is not intended to replace advice given to you by your health care provider. Make sure you discuss any questions you have with your health care provider.  Document Revised: 01/04/2024 Document Reviewed: 01/04/2024  [employer] Patient Education © 2024 [employer]

## 2024-06-17 ENCOUNTER — OFFICE VISIT (OUTPATIENT)
Dept: INTERNAL MEDICINE | Facility: CLINIC | Age: 69
End: 2024-06-17
Payer: COMMERCIAL

## 2024-06-17 VITALS
HEIGHT: 67 IN | DIASTOLIC BLOOD PRESSURE: 80 MMHG | TEMPERATURE: 98.6 F | BODY MASS INDEX: 30.97 KG/M2 | OXYGEN SATURATION: 98 % | HEART RATE: 69 BPM | RESPIRATION RATE: 16 BRPM | WEIGHT: 197.3 LBS | SYSTOLIC BLOOD PRESSURE: 120 MMHG

## 2024-06-17 DIAGNOSIS — R20.2 PARESTHESIA AND PAIN OF RIGHT EXTREMITY: ICD-10-CM

## 2024-06-17 DIAGNOSIS — K86.1 CHRONIC PANCREATITIS, UNSPECIFIED PANCREATITIS TYPE: ICD-10-CM

## 2024-06-17 DIAGNOSIS — M79.609 PARESTHESIA AND PAIN OF RIGHT EXTREMITY: ICD-10-CM

## 2024-06-17 DIAGNOSIS — Z79.4 TYPE 2 DIABETES MELLITUS WITH HYPERGLYCEMIA, WITH LONG-TERM CURRENT USE OF INSULIN: Primary | ICD-10-CM

## 2024-06-17 DIAGNOSIS — E11.65 TYPE 2 DIABETES MELLITUS WITH HYPERGLYCEMIA, WITH LONG-TERM CURRENT USE OF INSULIN: Primary | ICD-10-CM

## 2024-06-17 DIAGNOSIS — M54.16 LUMBAR RADICULOPATHY: ICD-10-CM

## 2024-06-17 LAB
EXPIRATION DATE: ABNORMAL
HBA1C MFR BLD: 8.2 % (ref 4.5–5.7)
Lab: ABNORMAL

## 2024-06-17 PROCEDURE — 99214 OFFICE O/P EST MOD 30 MIN: CPT

## 2024-06-17 PROCEDURE — 83036 HEMOGLOBIN GLYCOSYLATED A1C: CPT

## 2024-06-17 RX ORDER — ACYCLOVIR 400 MG/1
1 TABLET ORAL ONCE
Qty: 1 EACH | Refills: 0 | Status: SHIPPED | OUTPATIENT
Start: 2024-06-17 | End: 2024-06-17

## 2024-06-17 RX ORDER — ACYCLOVIR 400 MG/1
1 TABLET ORAL
Qty: 3 EACH | Refills: 2 | Status: SHIPPED | OUTPATIENT
Start: 2024-06-17
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
## 2024-06-19 DIAGNOSIS — Z79.4 TYPE 2 DIABETES MELLITUS WITH HYPERGLYCEMIA, WITH LONG-TERM CURRENT USE OF INSULIN: ICD-10-CM

## 2024-06-19 DIAGNOSIS — E11.65 TYPE 2 DIABETES MELLITUS WITH HYPERGLYCEMIA, WITH LONG-TERM CURRENT USE OF INSULIN: ICD-10-CM

## 2024-08-01 DIAGNOSIS — R11.0 NAUSEA: ICD-10-CM

## 2024-08-02 RX ORDER — ONDANSETRON 4 MG/1
4 TABLET, FILM COATED ORAL EVERY 8 HOURS PRN
Qty: 30 TABLET | Refills: 2 | Status: SHIPPED | OUTPATIENT
Start: 2024-08-02

## 2024-08-03 DIAGNOSIS — E11.65 TYPE 2 DIABETES MELLITUS WITH HYPERGLYCEMIA, WITHOUT LONG-TERM CURRENT USE OF INSULIN: ICD-10-CM

## 2024-08-03 DIAGNOSIS — M62.838 MUSCLE SPASMS OF BOTH LOWER EXTREMITIES: ICD-10-CM

## 2024-08-05 DIAGNOSIS — R42 DIZZINESS: ICD-10-CM

## 2024-08-05 RX ORDER — GLIPIZIDE 10 MG/1
20 TABLET, FILM COATED, EXTENDED RELEASE ORAL DAILY
Qty: 180 TABLET | Refills: 1 | Status: SHIPPED | OUTPATIENT
Start: 2024-08-05

## 2024-08-05 RX ORDER — ACYCLOVIR 400 MG/1
TABLET ORAL
COMMUNITY
Start: 2024-06-18

## 2024-08-05 RX ORDER — HYDROXYZINE HYDROCHLORIDE 25 MG/1
25 TABLET, FILM COATED ORAL DAILY PRN
Qty: 90 TABLET | Refills: 0 | Status: SHIPPED | OUTPATIENT
Start: 2024-08-05

## 2024-08-05 RX ORDER — METHOCARBAMOL 500 MG/1
500 TABLET, FILM COATED ORAL 3 TIMES DAILY PRN
Qty: 90 TABLET | Refills: 1 | Status: SHIPPED | OUTPATIENT
Start: 2024-08-05

## 2024-08-05 NOTE — TELEPHONE ENCOUNTER
Rx Refill Note  Requested Prescriptions     Pending Prescriptions Disp Refills    hydrOXYzine (ATARAX) 25 MG tablet [Pharmacy Med Name: HYDROXYZINE HCL 25 MG TABLET] 90 tablet 0     Sig: TAKE 1 TABLET BY MOUTH DAILY AS NEEDED (DIZZINESS).      Last office visit with prescribing clinician: 6/17/2024   Last telemedicine visit with prescribing clinician: Visit date not found   Next office visit with prescribing clinician: 9/16/2024                         Would you like a call back once the refill request has been completed: [] Yes [] No    If the office needs to give you a call back, can they leave a voicemail: [] Yes [] No    Maryellen Saravia RN  08/05/24, 07:05 CDT

## 2024-08-05 NOTE — TELEPHONE ENCOUNTER
Rx Refill Note  Requested Prescriptions     Pending Prescriptions Disp Refills    methocarbamol (ROBAXIN) 500 MG tablet [Pharmacy Med Name: METHOCARBAMOL 500 MG TABLET] 90 tablet 1     Sig: TAKE 1 TABLET BY MOUTH 3 TIMES A DAY AS NEEDED FOR MUSCLE SPASMS.    glipizide (GLUCOTROL XL) 10 MG 24 hr tablet [Pharmacy Med Name: GLIPIZIDE ER 10 MG TABLET] 180 tablet 1     Sig: TAKE 2 TABLETS BY MOUTH EVERY DAY      Last office visit with prescribing clinician: 6/17/2024   Last telemedicine visit with prescribing clinician: Visit date not found   Next office visit with prescribing clinician: 8/5/2024                         Would you like a call back once the refill request has been completed: [] Yes [] No    If the office needs to give you a call back, can they leave a voicemail: [] Yes [] No    Maryellen Saravia RN  08/05/24, 07:05 CDT

## 2024-08-14 DIAGNOSIS — E11.65 TYPE 2 DIABETES MELLITUS WITH HYPERGLYCEMIA, WITH LONG-TERM CURRENT USE OF INSULIN: ICD-10-CM

## 2024-08-14 DIAGNOSIS — Z79.4 TYPE 2 DIABETES MELLITUS WITH HYPERGLYCEMIA, WITH LONG-TERM CURRENT USE OF INSULIN: ICD-10-CM

## 2024-08-14 RX ORDER — INSULIN GLARGINE 100 [IU]/ML
INJECTION, SOLUTION SUBCUTANEOUS
Qty: 45 ML | Refills: 0 | Status: SHIPPED | OUTPATIENT
Start: 2024-08-14

## 2024-08-14 NOTE — TELEPHONE ENCOUNTER
Rx Refill Note  Requested Prescriptions     Pending Prescriptions Disp Refills    Lantus SoloStar 100 UNIT/ML injection pen [Pharmacy Med Name: LANTUS SOLOSTAR 100 UNIT/ML]       Sig: INJECT 22 UNITS UNDER THE SKIN INTO THE APPROPRIATE AREA AS DIRECTED EVERY NIGHT. MAY INCREASE BY INCREMENTS OF 2 UNITS EVERY 2 DAYS AS NEEDED FOR INCREASED FASTING GLUCOSE CONTROL TO A MAX DAILY DOSE OF 30 UNITS.      Last office visit with prescribing clinician: 6/17/2024   Last telemedicine visit with prescribing clinician: Visit date not found   Next office visit with prescribing clinician: 9/16/2024                         Would you like a call back once the refill request has been completed: [] Yes [] No    If the office needs to give you a call back, can they leave a voicemail: [] Yes [] No    Yenny Ly MA  08/14/24, 13:48 CDT

## 2024-08-27 DIAGNOSIS — M79.609 PARESTHESIA AND PAIN OF RIGHT EXTREMITY: ICD-10-CM

## 2024-08-27 DIAGNOSIS — R20.2 PARESTHESIA AND PAIN OF RIGHT EXTREMITY: ICD-10-CM

## 2024-08-27 RX ORDER — GABAPENTIN 300 MG/1
300 CAPSULE ORAL 2 TIMES DAILY
Qty: 180 CAPSULE | Refills: 0 | Status: SHIPPED | OUTPATIENT
Start: 2024-08-27

## 2024-08-27 NOTE — TELEPHONE ENCOUNTER
Rx Refill Note  Requested Prescriptions     Pending Prescriptions Disp Refills    gabapentin (NEURONTIN) 300 MG capsule [Pharmacy Med Name: GABAPENTIN 300 MG CAPSULE] 180 capsule 0     Sig: TAKE 1 CAPSULE BY MOUTH TWICE A DAY      Last office visit with prescribing clinician: 6/17/2024   Last telemedicine visit with prescribing clinician: Visit date not found   Next office visit with prescribing clinician: 9/16/2024                         Would you like a call back once the refill request has been completed: [] Yes [] No    If the office needs to give you a call back, can they leave a voicemail: [] Yes [] No    Maryellen Saravia RN  08/27/24, 10:37 CDT

## 2024-09-16 ENCOUNTER — OFFICE VISIT (OUTPATIENT)
Dept: INTERNAL MEDICINE | Facility: CLINIC | Age: 69
End: 2024-09-16
Payer: COMMERCIAL

## 2024-09-16 VITALS
RESPIRATION RATE: 16 BRPM | DIASTOLIC BLOOD PRESSURE: 78 MMHG | WEIGHT: 198 LBS | SYSTOLIC BLOOD PRESSURE: 126 MMHG | OXYGEN SATURATION: 97 % | TEMPERATURE: 98.3 F | HEART RATE: 66 BPM | BODY MASS INDEX: 31.08 KG/M2 | HEIGHT: 67 IN

## 2024-09-16 DIAGNOSIS — E11.65 TYPE 2 DIABETES MELLITUS WITH HYPERGLYCEMIA, WITH LONG-TERM CURRENT USE OF INSULIN: Primary | ICD-10-CM

## 2024-09-16 DIAGNOSIS — Z79.899 LONG TERM USE OF DRUG: ICD-10-CM

## 2024-09-16 DIAGNOSIS — R20.2 PARESTHESIA AND PAIN OF RIGHT EXTREMITY: ICD-10-CM

## 2024-09-16 DIAGNOSIS — M79.609 PARESTHESIA AND PAIN OF RIGHT EXTREMITY: ICD-10-CM

## 2024-09-16 DIAGNOSIS — R21 RASH AND NONSPECIFIC SKIN ERUPTION: ICD-10-CM

## 2024-09-16 DIAGNOSIS — E03.9 HYPOTHYROIDISM (ACQUIRED): ICD-10-CM

## 2024-09-16 DIAGNOSIS — K22.719 BARRETT'S ESOPHAGUS WITH DYSPLASIA: ICD-10-CM

## 2024-09-16 DIAGNOSIS — M54.9 MID BACK PAIN ON RIGHT SIDE: ICD-10-CM

## 2024-09-16 DIAGNOSIS — R10.84 ABDOMINAL DISCOMFORT, GENERALIZED: ICD-10-CM

## 2024-09-16 DIAGNOSIS — Z79.4 TYPE 2 DIABETES MELLITUS WITH HYPERGLYCEMIA, WITH LONG-TERM CURRENT USE OF INSULIN: Primary | ICD-10-CM

## 2024-09-16 DIAGNOSIS — K86.1 CHRONIC PANCREATITIS, UNSPECIFIED PANCREATITIS TYPE: ICD-10-CM

## 2024-09-16 DIAGNOSIS — E78.2 MIXED HYPERLIPIDEMIA: ICD-10-CM

## 2024-09-16 LAB
BILIRUB BLD-MCNC: NEGATIVE MG/DL
CLARITY, POC: CLEAR
COLOR UR: YELLOW
GLUCOSE UR STRIP-MCNC: NEGATIVE MG/DL
KETONES UR QL: NEGATIVE
LEUKOCYTE EST, POC: ABNORMAL
NITRITE UR-MCNC: NEGATIVE MG/ML
PH UR: 6 [PH] (ref 5–8)
PROT UR STRIP-MCNC: NEGATIVE MG/DL
RBC # UR STRIP: NEGATIVE /UL
SP GR UR: 1.01 (ref 1–1.03)
UROBILINOGEN UR QL: ABNORMAL

## 2024-09-16 PROCEDURE — 81003 URINALYSIS AUTO W/O SCOPE: CPT

## 2024-09-16 PROCEDURE — 99214 OFFICE O/P EST MOD 30 MIN: CPT

## 2024-09-16 RX ORDER — OXYCODONE HYDROCHLORIDE 5 MG/1
5 TABLET ORAL EVERY 6 HOURS PRN
Qty: 12 TABLET | Refills: 0 | Status: SHIPPED | OUTPATIENT
Start: 2024-09-16 | End: 2024-09-19

## 2024-09-16 RX ORDER — NYSTATIN 100000 U/G
1 OINTMENT TOPICAL 2 TIMES DAILY
Qty: 30 G | Refills: 5 | Status: SHIPPED | OUTPATIENT
Start: 2024-09-16

## 2024-09-17 DIAGNOSIS — N30.00 ACUTE CYSTITIS WITHOUT HEMATURIA: Primary | ICD-10-CM

## 2024-09-17 PROBLEM — K85.90 PANCREATITIS: Status: ACTIVE | Noted: 2020-02-27

## 2024-09-17 PROBLEM — K85.90 PANCREATITIS: Status: RESOLVED | Noted: 2020-02-27 | Resolved: 2024-09-17

## 2024-09-17 PROBLEM — R56.9 SEIZURE: Status: ACTIVE | Noted: 2020-02-27

## 2024-09-17 PROBLEM — J44.9 CHRONIC OBSTRUCTIVE PULMONARY DISEASE: Status: ACTIVE | Noted: 2019-12-28

## 2024-09-17 PROBLEM — J45.909 ASTHMA: Status: ACTIVE | Noted: 2020-02-27

## 2024-09-17 PROBLEM — K22.70 BARRETT'S ESOPHAGUS: Status: ACTIVE | Noted: 2020-02-27

## 2024-09-17 PROBLEM — Z86.2 PERSONAL HISTORY OF DISEASES OF THE BLOOD AND BLOOD-FORMING ORGANS AND CERTAIN DISORDERS INVOLVING THE IMMUNE MECHANISM: Status: ACTIVE | Noted: 2019-12-28

## 2024-09-17 PROBLEM — K44.9 DIAPHRAGMATIC HERNIA: Status: ACTIVE | Noted: 2021-09-24

## 2024-09-17 PROBLEM — D13.1 BENIGN NEOPLASM OF STOMACH: Status: ACTIVE | Noted: 2021-09-24

## 2024-09-17 PROBLEM — K76.0 FATTY LIVER: Status: ACTIVE | Noted: 2020-02-27

## 2024-09-17 RX ORDER — NITROFURANTOIN 25; 75 MG/1; MG/1
100 CAPSULE ORAL 2 TIMES DAILY
Qty: 14 CAPSULE | Refills: 0 | Status: SHIPPED | OUTPATIENT
Start: 2024-09-17 | End: 2024-09-24

## 2024-09-19 ENCOUNTER — TELEPHONE (OUTPATIENT)
Dept: INTERNAL MEDICINE | Facility: CLINIC | Age: 69
End: 2024-09-19

## 2024-09-19 NOTE — TELEPHONE ENCOUNTER
You prescribed the pt Nitrofurantoin and pt would like to make sure that it is not going to cause any issues with her pancreas, having already had chronic pancreatic issues.  Please advise.

## 2024-09-19 NOTE — TELEPHONE ENCOUNTER
Caller: Charlene Bey    Relationship: Self    Best call back number: [phone]     Which medication are you concerned about: NITROFURANTOIN 100 MG    Who prescribed you this medication: NICHOLE    When did you start taking this medication: HASN'T STARTED JUST PICKED IT UP    What are your concerns: HAS SEEN THAT SOME OF THE SIDE EFFECTS CAN CAUSE PANCREATITIS. PATIENT ALREADY HAS CHRONIC PANCREATITIS. WANTS TO KNOW IF SHOULD EVEN TAKE IT OR IF SOMETHING ELSE SHOULD BE CALLED.   PLEASE CALL BACK TO ADVISE

## 2024-09-20 RX ORDER — CEFDINIR 300 MG/1
300 CAPSULE ORAL 2 TIMES DAILY
Qty: 14 CAPSULE | Refills: 0 | Status: SHIPPED | OUTPATIENT
Start: 2024-09-20 | End: 2024-09-27

## 2024-09-20 NOTE — TELEPHONE ENCOUNTER
Caller: Charlene Bey    Relationship: Self    Best call back number: [phone]    What is the best time to reach you: ANY    Who are you requesting to speak with (clinical staff, provider,  specific staff member): NONE SPECIFIED     What was the call regarding:     PATIENT WOULD LIKE TO CHECK ON THE STATUS OF THIS MESSAGE.     Is it okay if the provider responds through MyChart: PLEASE CALL

## 2024-09-20 NOTE — TELEPHONE ENCOUNTER
Please see message thread. Alex agreed to send in new med due to patient concerns of pancreatitis. She states she has previously tolerated cipro. I advised I would discuss with you reviewing her test results so that you can send in something for her to go ahead and start rather than waiting for Alex over the weekend.

## 2024-09-20 NOTE — TELEPHONE ENCOUNTER
Sandra sent in alternative abx for patient. Patient has been notified and will begin abx. She will let us know if she has any further questions or concerns.

## 2024-09-22 LAB
ALBUMIN/CREAT UR: 28 MG/G CREAT (ref 0–29)
CREAT UR-MCNC: 62.5 MG/DL
DRUGS UR: NORMAL
MICROALBUMIN UR-MCNC: 17.4 UG/ML

## 2024-10-07 ENCOUNTER — TELEPHONE (OUTPATIENT)
Dept: INTERNAL MEDICINE | Facility: CLINIC | Age: 69
End: 2024-10-07
Payer: COMMERCIAL

## 2024-11-01 DIAGNOSIS — R42 DIZZINESS: ICD-10-CM

## 2024-11-01 RX ORDER — HYDROXYZINE HYDROCHLORIDE 25 MG/1
25 TABLET, FILM COATED ORAL DAILY PRN
Qty: 90 TABLET | Refills: 0 | Status: SHIPPED | OUTPATIENT
Start: 2024-11-01

## 2024-11-01 NOTE — TELEPHONE ENCOUNTER
Rx Refill Note  Requested Prescriptions     Pending Prescriptions Disp Refills    hydrOXYzine (ATARAX) 25 MG tablet [Pharmacy Med Name: HYDROXYZINE HCL 25 MG TABLET] 90 tablet 0     Sig: TAKE 1 TABLET BY MOUTH DAILY AS NEEDED (DIZZINESS).      Last office visit with prescribing clinician: 9/16/2024   Last telemedicine visit with prescribing clinician: Visit date not found   Next office visit with prescribing clinician: 12/16/2024                         Would you like a call back once the refill request has been completed: [] Yes [] No    If the office needs to give you a call back, can they leave a voicemail: [] Yes [] No    Maryellen Saravia RN  11/01/24, 07:07 CDT

## 2024-12-16 ENCOUNTER — OFFICE VISIT (OUTPATIENT)
Dept: INTERNAL MEDICINE | Facility: CLINIC | Age: 69
End: 2024-12-16
Payer: COMMERCIAL

## 2024-12-16 VITALS
OXYGEN SATURATION: 97 % | WEIGHT: 198 LBS | SYSTOLIC BLOOD PRESSURE: 116 MMHG | BODY MASS INDEX: 31.08 KG/M2 | HEART RATE: 63 BPM | HEIGHT: 67 IN | TEMPERATURE: 97.8 F | DIASTOLIC BLOOD PRESSURE: 73 MMHG

## 2024-12-16 DIAGNOSIS — R07.9 CHEST PAIN, UNSPECIFIED TYPE: ICD-10-CM

## 2024-12-16 DIAGNOSIS — E03.9 HYPOTHYROIDISM (ACQUIRED): ICD-10-CM

## 2024-12-16 DIAGNOSIS — Z79.4 TYPE 2 DIABETES MELLITUS WITH HYPERGLYCEMIA, WITH LONG-TERM CURRENT USE OF INSULIN: Primary | ICD-10-CM

## 2024-12-16 DIAGNOSIS — M79.609 PARESTHESIA AND PAIN OF RIGHT EXTREMITY: ICD-10-CM

## 2024-12-16 DIAGNOSIS — E78.2 MIXED HYPERLIPIDEMIA: ICD-10-CM

## 2024-12-16 DIAGNOSIS — E11.65 TYPE 2 DIABETES MELLITUS WITH HYPERGLYCEMIA, WITH LONG-TERM CURRENT USE OF INSULIN: Primary | ICD-10-CM

## 2024-12-16 DIAGNOSIS — R20.2 PARESTHESIA AND PAIN OF RIGHT EXTREMITY: ICD-10-CM

## 2024-12-16 DIAGNOSIS — R10.13 EPIGASTRIC PAIN: ICD-10-CM

## 2024-12-16 DIAGNOSIS — Z23 NEED FOR INFLUENZA VACCINATION: ICD-10-CM

## 2024-12-16 PROCEDURE — 93000 ELECTROCARDIOGRAM COMPLETE: CPT

## 2024-12-16 PROCEDURE — 90471 IMMUNIZATION ADMIN: CPT

## 2024-12-16 PROCEDURE — 99214 OFFICE O/P EST MOD 30 MIN: CPT

## 2024-12-16 PROCEDURE — 90662 IIV NO PRSV INCREASED AG IM: CPT

## 2024-12-16 RX ORDER — GABAPENTIN 300 MG/1
300 CAPSULE ORAL 2 TIMES DAILY
Qty: 180 CAPSULE | Refills: 0 | Status: SHIPPED | OUTPATIENT
Start: 2024-12-16
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
## 2024-12-16 NOTE — LETTER
[facility]  Vaccine Consent Form    Patient Name:  Charlene Bey  Patient :  1955     Vaccine(s) Ordered    Fluzone High-Dose 65+yrs        Screening Checklist  The following questions should be completed prior to vaccination. If you answer “yes” to any question, it does not necessarily mean you should not be vaccinated. It just means we may need to clarify or ask more questions. If a question is unclear, please ask your healthcare provider to explain it.    Yes No   Any fever or moderate to severe illness today (mild illness and/or antibiotic treatment are not contraindications)?     Do you have a history of a serious reaction to any previous vaccinations, such as anaphylaxis, encephalopathy within 7 days, Guillain-[place] syndrome within 6 weeks, seizure?     Have you received any live vaccine(s) (e.g MMR, SPENSER) or any other vaccines in the last month (to ensure duplicate doses aren't given)?     Do you have an anaphylactic allergy to latex (DTaP, DTaP-IPV, Hep A, Hep B, MenB, RV, Td, Tdap), baker’s yeast (Hep B, HPV), polysorbates (RSV, nirsevimab, PCV 20, Rotavirrus, Tdap, Shingrix), or gelatin (SPENSER, MMR)?     Do you have an anaphylactic allergy to neomycin (Rabies, SPENSER, MMR, IPV, Hep A), polymyxin B (IPV), or streptomycin (IPV)?      Any cancer, leukemia, AIDS, or other immune system disorder? (SPENSER, MMR, RV)     Do you have a parent, brother, or sister with an immune system problem (if immune competence of vaccine recipient clinically verified, can proceed)? (MMR, SPENSER)     Any recent steroid treatments for >2 weeks, chemotherapy, or radiation treatment? (SPENSER, MMR)     Have you received antibody-containing blood transfusions or IVIG in the past 11 months (recommended interval is dependent on product)? (MMR, SPENSER)     Have you taken antiviral drugs (acyclovir, famciclovir, valacyclovir for SPENSER) in the last 24 or 48 hours, respectively?      Are you pregnant or planning to become pregnant within 1 month?  "(SPENSER, MMR, HPV, IPV, MenB, Abrexvy; For Hep B- refer to Engerix-B; For RSV - Abrysvo is indicated for 32-36 weeks of pregnancy from September to January)     For infants, have you ever been told your child has had intussusception or a medical emergency involving obstruction of the intestine (Rotavirus)? If not for an infant, can skip this question.         *Ordering Physicians/APC should be consulted if \"yes\" is checked by the patient or guardian above.  I have received, read, and understand the Vaccine Information Statement (VIS) for each vaccine ordered.  I have considered my or my child's health status as well as the health status of my close contacts.  I have taken the opportunity to discuss my vaccine questions with my or my child's health care provider.   I have requested that the ordered vaccine(s) be given to me or my child.  I understand the benefits and risks of the vaccines.  I understand that I should remain in the clinic for 15 minutes after receiving the vaccine(s).  _________________________________________________________  Signature of Patient or Parent/Legal Guardian ____________________  Date   "

## 2024-12-17 DIAGNOSIS — E03.9 HYPOTHYROIDISM, UNSPECIFIED: ICD-10-CM

## 2024-12-17 LAB
ALBUMIN SERPL-MCNC: 4.5 G/DL (ref 3.9–4.9)
ALP SERPL-CCNC: 76 IU/L (ref 44–121)
ALT SERPL-CCNC: 21 IU/L (ref 0–32)
AST SERPL-CCNC: 20 IU/L (ref 0–40)
BASOPHILS # BLD AUTO: 0 X10E3/UL (ref 0–0.2)
BASOPHILS NFR BLD AUTO: 1 %
BILIRUB SERPL-MCNC: 0.3 MG/DL (ref 0–1.2)
BUN SERPL-MCNC: 13 MG/DL (ref 8–27)
BUN/CREAT SERPL: 16 (ref 12–28)
CALCIUM SERPL-MCNC: 9.8 MG/DL (ref 8.7–10.3)
CHLORIDE SERPL-SCNC: 102 MMOL/L (ref 96–106)
CHOLEST SERPL-MCNC: 185 MG/DL (ref 100–199)
CO2 SERPL-SCNC: 24 MMOL/L (ref 20–29)
CREAT SERPL-MCNC: 0.83 MG/DL (ref 0.57–1)
EGFRCR SERPLBLD CKD-EPI 2021: 77 ML/MIN/1.73
EOSINOPHIL # BLD AUTO: 0.1 X10E3/UL (ref 0–0.4)
EOSINOPHIL NFR BLD AUTO: 2 %
ERYTHROCYTE [DISTWIDTH] IN BLOOD BY AUTOMATED COUNT: 14.4 % (ref 11.7–15.4)
GLOBULIN SER CALC-MCNC: 2.5 G/DL (ref 1.5–4.5)
GLUCOSE SERPL-MCNC: 159 MG/DL (ref 70–99)
HBA1C MFR BLD: 8.1 % (ref 4.8–5.6)
HCT VFR BLD AUTO: 42.2 % (ref 34–46.6)
HDLC SERPL-MCNC: 36 MG/DL
HGB BLD-MCNC: 13.3 G/DL (ref 11.1–15.9)
IMM GRANULOCYTES # BLD AUTO: 0 X10E3/UL (ref 0–0.1)
IMM GRANULOCYTES NFR BLD AUTO: 0 %
LDLC SERPL CALC-MCNC: 126 MG/DL (ref 0–99)
LYMPHOCYTES # BLD AUTO: 2.4 X10E3/UL (ref 0.7–3.1)
LYMPHOCYTES NFR BLD AUTO: 33 %
MCH RBC QN AUTO: 26.7 PG (ref 26.6–33)
MCHC RBC AUTO-ENTMCNC: 31.5 G/DL (ref 31.5–35.7)
MCV RBC AUTO: 85 FL (ref 79–97)
MONOCYTES # BLD AUTO: 0.5 X10E3/UL (ref 0.1–0.9)
MONOCYTES NFR BLD AUTO: 7 %
NEUTROPHILS # BLD AUTO: 4.3 X10E3/UL (ref 1.4–7)
NEUTROPHILS NFR BLD AUTO: 57 %
PLATELET # BLD AUTO: 216 X10E3/UL (ref 150–450)
POTASSIUM SERPL-SCNC: 4.4 MMOL/L (ref 3.5–5.2)
PROT SERPL-MCNC: 7 G/DL (ref 6–8.5)
RBC # BLD AUTO: 4.99 X10E6/UL (ref 3.77–5.28)
SODIUM SERPL-SCNC: 141 MMOL/L (ref 134–144)
T3FREE SERPL-MCNC: 2.7 PG/ML (ref 2–4.4)
T4 FREE SERPL-MCNC: 0.41 NG/DL (ref 0.82–1.77)
TRIGL SERPL-MCNC: 126 MG/DL (ref 0–149)
TSH SERPL DL<=0.005 MIU/L-ACNC: 37.8 UIU/ML (ref 0.45–4.5)
VLDLC SERPL CALC-MCNC: 23 MG/DL (ref 5–40)
WBC # BLD AUTO: 7.5 X10E3/UL (ref 3.4–10.8)

## 2024-12-17 RX ORDER — LEVOTHYROXINE SODIUM 100 UG/1
100 TABLET ORAL DAILY
Qty: 90 TABLET | Refills: 0 | Status: SHIPPED | OUTPATIENT
Start: 2024-12-17

## 2025-01-12 DIAGNOSIS — R11.0 NAUSEA: ICD-10-CM

## 2025-01-12 DIAGNOSIS — E11.65 TYPE 2 DIABETES MELLITUS WITH HYPERGLYCEMIA: ICD-10-CM

## 2025-01-12 DIAGNOSIS — E11.65 TYPE 2 DIABETES MELLITUS WITH HYPERGLYCEMIA, WITH LONG-TERM CURRENT USE OF INSULIN: ICD-10-CM

## 2025-01-12 DIAGNOSIS — Z79.4 TYPE 2 DIABETES MELLITUS WITH HYPERGLYCEMIA, WITH LONG-TERM CURRENT USE OF INSULIN: ICD-10-CM

## 2025-01-13 RX ORDER — ONDANSETRON 4 MG/1
4 TABLET, FILM COATED ORAL EVERY 8 HOURS PRN
Qty: 90 TABLET | Refills: 1 | Status: SHIPPED | OUTPATIENT
Start: 2025-01-13

## 2025-01-13 RX ORDER — INSULIN GLARGINE 100 [IU]/ML
INJECTION, SOLUTION SUBCUTANEOUS
Qty: 25 ML | Refills: 1 | Status: SHIPPED | OUTPATIENT
Start: 2025-01-13

## 2025-01-13 RX ORDER — PEN NEEDLE, DIABETIC 31 GX5/16"
1 NEEDLE, DISPOSABLE MISCELLANEOUS DAILY
Qty: 100 EACH | Refills: 2 | Status: SHIPPED | OUTPATIENT
Start: 2025-01-13

## 2025-01-13 RX ORDER — MONTELUKAST SODIUM 10 MG/1
10 TABLET ORAL DAILY
Qty: 90 TABLET | Refills: 1 | Status: SHIPPED | OUTPATIENT
Start: 2025-01-13

## 2025-01-13 NOTE — TELEPHONE ENCOUNTER
Rx Refill Note  Requested Prescriptions     Pending Prescriptions Disp Refills    montelukast (SINGULAIR) 10 MG tablet       Sig: Take 1 tablet by mouth Daily.    Insulin Pen Needle (B-D ULTRAFINE III SHORT PEN) 31G X 8 MM misc 100 each 2    ondansetron (ZOFRAN) 4 MG tablet 30 tablet 2     Sig: Take 1 tablet by mouth Every 8 (Eight) Hours As Needed for Nausea or Vomiting.      Last office visit with prescribing clinician: 12/16/2024   Last telemedicine visit with prescribing clinician: Visit date not found   Next office visit with prescribing clinician: 3/17/2025                         Would you like a call back once the refill request has been completed: [] Yes [] No    If the office needs to give you a call back, can they leave a voicemail: [] Yes [] No    Maryellen Saravia RN  01/13/25, 07:10 CST

## 2025-01-13 NOTE — TELEPHONE ENCOUNTER
Rx Refill Note  Requested Prescriptions     Pending Prescriptions Disp Refills    Lantus SoloStar 100 UNIT/ML injection pen [Pharmacy Med Name: LANTUS SOLOSTAR 100 UNIT/ML]       Sig: INJECT 22 UNITS UNDER THE SKIN INTO THE APPROPRIATE AREA AS DIRECTED EVERY NIGHT. MAY INCREASE BY INCREMENTS OF 2 UNITS EVERY 2 DAYS AS NEEDED FOR INCREASED FASTING GLUCOSE CONTROL TO A MAX DAILY DOSE OF 30 UNITS.      Last office visit with prescribing clinician: 12/16/2024   Last telemedicine visit with prescribing clinician: Visit date not found   Next office visit with prescribing clinician: 1/12/2025                         Would you like a call back once the refill request has been completed: [] Yes [] No    If the office needs to give you a call back, can they leave a voicemail: [] Yes [] No    Maryellen Saravia RN  01/13/25, 07:10 CST

## 2025-01-29 ENCOUNTER — OFFICE VISIT (OUTPATIENT)
Dept: GASTROENTEROLOGY | Facility: CLINIC | Age: 70
End: 2025-01-29
Payer: COMMERCIAL

## 2025-01-29 VITALS
SYSTOLIC BLOOD PRESSURE: 122 MMHG | OXYGEN SATURATION: 95 % | TEMPERATURE: 97.3 F | WEIGHT: 201 LBS | HEIGHT: 67 IN | BODY MASS INDEX: 31.55 KG/M2 | HEART RATE: 78 BPM | DIASTOLIC BLOOD PRESSURE: 80 MMHG

## 2025-01-29 DIAGNOSIS — R11.0 NAUSEA: Primary | ICD-10-CM

## 2025-01-29 DIAGNOSIS — R10.13 EPIGASTRIC PAIN: ICD-10-CM

## 2025-01-29 DIAGNOSIS — K86.1 IDIOPATHIC CHRONIC PANCREATITIS: ICD-10-CM

## 2025-01-29 DIAGNOSIS — R07.9 CHEST PAIN, UNSPECIFIED TYPE: ICD-10-CM

## 2025-01-29 DIAGNOSIS — K59.00 CONSTIPATION, UNSPECIFIED CONSTIPATION TYPE: ICD-10-CM

## 2025-01-29 PROCEDURE — 99214 OFFICE O/P EST MOD 30 MIN: CPT | Performed by: NURSE PRACTITIONER

## 2025-01-29 NOTE — ASSESSMENT & PLAN NOTE
48 hours of liquid diet.  Continue Creon 3 times daily.  Call with any fever at which time we can CT scan, but if any severe pain she needs to go straight to the ER.

## 2025-01-29 NOTE — ASSESSMENT & PLAN NOTE
Begin Miralax three times per day.  We can titrate up to two double doses twice daily or lower if too loose stools.  3 month follow up
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
## 2025-02-06 DIAGNOSIS — R42 DIZZINESS: ICD-10-CM

## 2025-02-06 RX ORDER — HYDROXYZINE HYDROCHLORIDE 25 MG/1
25 TABLET, FILM COATED ORAL DAILY PRN
Qty: 90 TABLET | Refills: 0 | Status: SHIPPED | OUTPATIENT
Start: 2025-02-06

## 2025-02-19 DIAGNOSIS — E11.65 TYPE 2 DIABETES MELLITUS WITH HYPERGLYCEMIA, WITHOUT LONG-TERM CURRENT USE OF INSULIN: ICD-10-CM

## 2025-02-20 RX ORDER — SERTRALINE HYDROCHLORIDE 100 MG/1
100 TABLET, FILM COATED ORAL DAILY
Qty: 90 TABLET | Refills: 3 | Status: SHIPPED | OUTPATIENT
Start: 2025-02-20

## 2025-02-20 RX ORDER — GLIPIZIDE 10 MG/1
20 TABLET, FILM COATED, EXTENDED RELEASE ORAL DAILY
Qty: 180 TABLET | Refills: 1 | Status: SHIPPED | OUTPATIENT
Start: 2025-02-20

## 2025-02-27 ENCOUNTER — ANESTHESIA (OUTPATIENT)
Dept: GASTROENTEROLOGY | Facility: HOSPITAL | Age: 70
End: 2025-02-27
Payer: COMMERCIAL

## 2025-02-27 ENCOUNTER — HOSPITAL ENCOUNTER (OUTPATIENT)
Facility: HOSPITAL | Age: 70
Setting detail: HOSPITAL OUTPATIENT SURGERY
Discharge: HOME | End: 2025-02-27
Attending: INTERNAL MEDICINE | Admitting: INTERNAL MEDICINE
Payer: COMMERCIAL

## 2025-02-27 ENCOUNTER — ANESTHESIA EVENT (OUTPATIENT)
Dept: GASTROENTEROLOGY | Facility: HOSPITAL | Age: 70
End: 2025-02-27
Payer: COMMERCIAL

## 2025-02-27 VITALS
BODY MASS INDEX: 31.23 KG/M2 | OXYGEN SATURATION: 98 % | DIASTOLIC BLOOD PRESSURE: 60 MMHG | WEIGHT: 199 LBS | HEART RATE: 54 BPM | HEIGHT: 67 IN | RESPIRATION RATE: 18 BRPM | TEMPERATURE: 96.2 F | SYSTOLIC BLOOD PRESSURE: 120 MMHG

## 2025-02-27 DIAGNOSIS — R11.0 NAUSEA: ICD-10-CM

## 2025-02-27 DIAGNOSIS — R07.9 CHEST PAIN, UNSPECIFIED TYPE: ICD-10-CM

## 2025-02-27 LAB — GLUCOSE BLDC GLUCOMTR-MCNC: 190 MG/DL (ref 70–130)

## 2025-02-27 PROCEDURE — 88342 IMHCHEM/IMCYTCHM 1ST ANTB: CPT | Performed by: INTERNAL MEDICINE

## 2025-02-27 PROCEDURE — 82948 REAGENT STRIP/BLOOD GLUCOSE: CPT

## 2025-02-27 PROCEDURE — 88305 TISSUE EXAM BY PATHOLOGIST: CPT | Performed by: INTERNAL MEDICINE

## 2025-02-27 PROCEDURE — 25010000002 LIDOCAINE PF 2% 2 % SOLUTION: Performed by: NURSE ANESTHETIST, CERTIFIED REGISTERED

## 2025-02-27 PROCEDURE — 25010000002 PROPOFOL 10 MG/ML EMULSION: Performed by: NURSE ANESTHETIST, CERTIFIED REGISTERED

## 2025-02-27 PROCEDURE — 43239 EGD BIOPSY SINGLE/MULTIPLE: CPT | Performed by: INTERNAL MEDICINE

## 2025-02-27 RX ORDER — LIDOCAINE HYDROCHLORIDE 20 MG/ML
INJECTION, SOLUTION EPIDURAL; INFILTRATION; INTRACAUDAL; PERINEURAL AS NEEDED
Status: DISCONTINUED | OUTPATIENT
Start: 2025-02-27 | End: 2025-02-27 | Stop reason: SURG

## 2025-02-27 RX ORDER — SODIUM CHLORIDE 0.9 % (FLUSH) 0.9 %
10 SYRINGE (ML) INJECTION AS NEEDED
Status: DISCONTINUED | OUTPATIENT
Start: 2025-02-27 | End: 2025-02-27 | Stop reason: HOSPADM

## 2025-02-27 RX ORDER — LIDOCAINE HYDROCHLORIDE 10 MG/ML
0.5 INJECTION, SOLUTION EPIDURAL; INFILTRATION; INTRACAUDAL; PERINEURAL ONCE AS NEEDED
Status: DISCONTINUED | OUTPATIENT
Start: 2025-02-27 | End: 2025-02-27 | Stop reason: HOSPADM

## 2025-02-27 RX ORDER — PROPOFOL 10 MG/ML
VIAL (ML) INTRAVENOUS AS NEEDED
Status: DISCONTINUED | OUTPATIENT
Start: 2025-02-27 | End: 2025-02-27 | Stop reason: SURG

## 2025-02-27 RX ORDER — SODIUM CHLORIDE 9 MG/ML
500 INJECTION, SOLUTION INTRAVENOUS CONTINUOUS PRN
Status: DISCONTINUED | OUTPATIENT
Start: 2025-02-27 | End: 2025-02-27 | Stop reason: HOSPADM

## 2025-02-27 RX ADMIN — LIDOCAINE HYDROCHLORIDE 100 MG: 20 INJECTION, SOLUTION EPIDURAL; INFILTRATION; INTRACAUDAL; PERINEURAL at 08:16

## 2025-02-27 RX ADMIN — PROPOFOL 100 MG: 10 INJECTION, EMULSION INTRAVENOUS at 08:16

## 2025-02-27 RX ADMIN — Medication 10 ML: at 07:39

## 2025-02-27 RX ADMIN — PROPOFOL 100 MG: 10 INJECTION, EMULSION INTRAVENOUS at 08:21

## 2025-02-27 NOTE — ANESTHESIA POSTPROCEDURE EVALUATION
Patient: Charlene Bey    Procedure Summary       Date: 02/27/25 Room / Location: [facility] ENDOSCOPY 2 / [facility] ENDOSCOPY    Anesthesia Start: 0813 Anesthesia Stop: 0830    Procedure: ESOPHAGOGASTRODUODENOSCOPY WITH ANESTHESIA Diagnosis:       Nausea      Chest pain, unspecified type      (Nausea [R11.0])      (Chest pain, unspecified type [R07.9])    Surgeons: Eneida Maldonado MD Provider: Ihsan Ac CRNA    Anesthesia Type: MAC ASA Status: 3            Anesthesia Type: MAC    Vitals  Vitals Value Taken Time   BP 95/54 02/27/25 0829   Temp     Pulse 54 02/27/25 0832   Resp 18 02/27/25 0828   SpO2 97 % 02/27/25 0832   Vitals shown include unfiled device data.        Post Anesthesia Care and Evaluation    Patient location during evaluation: PHASE II  Patient participation: complete - patient participated  Level of consciousness: awake and alert  Pain management: adequate    Airway patency: patent  Anesthetic complications: No anesthetic complications  PONV Status: none  Cardiovascular status: acceptable and stable  Respiratory status: acceptable and unassisted  Hydration status: acceptable

## 2025-02-27 NOTE — ANESTHESIA PREPROCEDURE EVALUATION
Anesthesia Evaluation     Patient summary reviewed   history of anesthetic complications:  PONV  NPO Solid Status: > 8 hours             Airway   Mallampati: II  Dental      Pulmonary - negative pulmonary ROS   (+) COPD, asthma,  Cardiovascular - negative cardio ROS  Exercise tolerance: excellent (>7 METS)    (-) pacemaker, past MI, cardiac stents, CABG      Neuro/Psych- negative ROS  (+) headaches, dizziness/light headedness, numbness  GI/Hepatic/Renal/Endo    (+) obesity, hiatal hernia, GERD, PUD, liver disease fatty liver disease, diabetes mellitus using insulin, thyroid problem hypothyroidism    Musculoskeletal     Abdominal    Substance History      OB/GYN          Other   arthritis,   history of cancer                    Anesthesia Plan    ASA 3     MAC     (Extensive discussion of chest pain-->part of reason she is being scoped today. May be cardiac in origin, but OK to proceed )    Anesthetic plan, risks, benefits, and alternatives have been provided, discussed and informed consent has been obtained with: patient.      CODE STATUS:

## 2025-03-07 ENCOUNTER — TELEPHONE (OUTPATIENT)
Dept: GASTROENTEROLOGY | Facility: CLINIC | Age: 70
End: 2025-03-07
Payer: COMMERCIAL

## 2025-03-07 DIAGNOSIS — B96.81 HELICOBACTER PYLORI GASTRITIS: Primary | ICD-10-CM

## 2025-03-07 DIAGNOSIS — K29.70 HELICOBACTER PYLORI GASTRITIS: Primary | ICD-10-CM

## 2025-03-07 NOTE — TELEPHONE ENCOUNTER
Pt sent [facility] message saying she saw on [employer] she has h-pylori and is requesting antibiotic. She had egd on 2/27. Please advise.

## 2025-03-07 NOTE — TELEPHONE ENCOUNTER
Caller: Charlene Bey    Relationship: Self    Best call back number: [phone]    What medication are you requesting: ANTIBIOTICS        If a prescription is needed, what is your preferred pharmacy and phone number:    [facility]/pharmacy #[id number] - [place], [place] - [address] [phone] [facility] [phone] [address] [address]       Additional notes: PT SAW ON MYCHART A LAB RESULT THAT STATES SHE HAS H-PYLORI. BASED UPON THAT RESULT PT IS REQUESTING A PRESCRIPTION BE CALLED IN FOR AN ANTIBIOTIC. PLEASE CONTACT PT TO ASSIST/ADVISE.

## 2025-03-11 NOTE — TELEPHONE ENCOUNTER
Pt called me just now-she tells me she is miserable. She is still having a lot of epigastric pain as well as bloating. She tells me she hasn't even eaten anything yet today and she is still miserable.     She can see her results in MyChart and is asking what to do about the H.Pylori? I told her I would send you a message but it would be tomorrow before I could get back to her since you are [facility] today. I advised her I would see what recommendations you have and would call her back after speaking to you.

## 2025-03-12 PROBLEM — K29.70 HELICOBACTER PYLORI GASTRITIS: Status: ACTIVE | Noted: 2025-03-12

## 2025-03-12 PROBLEM — K21.9 GASTROESOPHAGEAL REFLUX DISEASE WITHOUT ESOPHAGITIS: Status: ACTIVE | Noted: 2020-02-27

## 2025-03-12 PROBLEM — B96.81 HELICOBACTER PYLORI GASTRITIS: Status: ACTIVE | Noted: 2025-03-12

## 2025-03-12 RX ORDER — BISMUTH SUBSALICYLATE 262 MG/1
524 TABLET, CHEWABLE ORAL
Qty: 112 TABLET | Refills: 0 | Status: SHIPPED | OUTPATIENT
Start: 2025-03-12 | End: 2025-03-26

## 2025-03-12 RX ORDER — METRONIDAZOLE 500 MG/1
500 TABLET ORAL 3 TIMES DAILY
Qty: 42 TABLET | Refills: 0 | Status: SHIPPED | OUTPATIENT
Start: 2025-03-12 | End: 2025-03-26

## 2025-03-12 RX ORDER — AMOXICILLIN 500 MG/1
1000 TABLET, FILM COATED ORAL 3 TIMES DAILY
Qty: 84 TABLET | Refills: 0 | Status: SHIPPED | OUTPATIENT
Start: 2025-03-12 | End: 2025-03-26

## 2025-03-12 RX ORDER — PANTOPRAZOLE SODIUM 40 MG/1
40 TABLET, DELAYED RELEASE ORAL 2 TIMES DAILY
Qty: 28 TABLET | Refills: 0 | Status: SHIPPED | OUTPATIENT
Start: 2025-03-12 | End: 2025-03-26

## 2025-03-12 NOTE — TELEPHONE ENCOUNTER
Please remind her that I told her after the procedure that there is no urgency to treating H. pylori.  Reassure her in this regard.    I have sent prescriptions to the pharmacy.  Flagyl 500 mg three times a day for 14 days    Amoxicillin 2 tablets three time a day for 14 days  Protonix twice a day for 14 days--she will hold the omeprazole while she does this treatment  Pepto-Bismol 2 tablets 4 times a day for 14 days    Once all of this is done, pt will need to have an H. pylori stool antigen test done in early May.  I have placed order for this into the computer as well.      Eneida Maldonado MD

## 2025-03-12 NOTE — TELEPHONE ENCOUNTER
Called and spoke to pt re: results-she VU. I went over treatment instructions with her at great length and advised her to have all 4 medications on hand before starting any of them. She will make herself a note to go by the lab in May to have stool test done. I have sent her a copy of the message in [employer] to refer back to if needed. Pt advised to call me back with any further questions/problems.

## 2025-03-16 DIAGNOSIS — E03.9 HYPOTHYROIDISM, UNSPECIFIED: ICD-10-CM

## 2025-03-17 ENCOUNTER — OFFICE VISIT (OUTPATIENT)
Dept: INTERNAL MEDICINE | Facility: CLINIC | Age: 70
End: 2025-03-17
Payer: COMMERCIAL

## 2025-03-17 VITALS
OXYGEN SATURATION: 98 % | HEART RATE: 58 BPM | SYSTOLIC BLOOD PRESSURE: 122 MMHG | TEMPERATURE: 97.8 F | RESPIRATION RATE: 16 BRPM | HEIGHT: 67 IN | DIASTOLIC BLOOD PRESSURE: 82 MMHG | BODY MASS INDEX: 31.71 KG/M2 | WEIGHT: 202 LBS

## 2025-03-17 DIAGNOSIS — E03.9 HYPOTHYROIDISM (ACQUIRED): ICD-10-CM

## 2025-03-17 DIAGNOSIS — B96.81 HELICOBACTER PYLORI GASTRITIS: ICD-10-CM

## 2025-03-17 DIAGNOSIS — Z13.820 ENCOUNTER FOR OSTEOPOROSIS SCREENING IN ASYMPTOMATIC POSTMENOPAUSAL PATIENT: ICD-10-CM

## 2025-03-17 DIAGNOSIS — E11.65 TYPE 2 DIABETES MELLITUS WITH HYPERGLYCEMIA, WITH LONG-TERM CURRENT USE OF INSULIN: ICD-10-CM

## 2025-03-17 DIAGNOSIS — K86.1 IDIOPATHIC CHRONIC PANCREATITIS: ICD-10-CM

## 2025-03-17 DIAGNOSIS — E55.9 VITAMIN D DEFICIENCY: ICD-10-CM

## 2025-03-17 DIAGNOSIS — Z79.4 TYPE 2 DIABETES MELLITUS WITH HYPERGLYCEMIA, WITH LONG-TERM CURRENT USE OF INSULIN: ICD-10-CM

## 2025-03-17 DIAGNOSIS — Z78.0 ENCOUNTER FOR OSTEOPOROSIS SCREENING IN ASYMPTOMATIC POSTMENOPAUSAL PATIENT: ICD-10-CM

## 2025-03-17 DIAGNOSIS — M65.932 TENOSYNOVITIS OF LEFT WRIST: ICD-10-CM

## 2025-03-17 DIAGNOSIS — Z00.00 ANNUAL PHYSICAL EXAM: Primary | ICD-10-CM

## 2025-03-17 DIAGNOSIS — M79.609 PARESTHESIA AND PAIN OF RIGHT EXTREMITY: ICD-10-CM

## 2025-03-17 DIAGNOSIS — K29.70 HELICOBACTER PYLORI GASTRITIS: ICD-10-CM

## 2025-03-17 DIAGNOSIS — R20.2 PARESTHESIA AND PAIN OF RIGHT EXTREMITY: ICD-10-CM

## 2025-03-17 DIAGNOSIS — Z86.2 HISTORY OF ANEMIA DUE TO VITAMIN B12 DEFICIENCY: ICD-10-CM

## 2025-03-17 PROCEDURE — 99397 PER PM REEVAL EST PAT 65+ YR: CPT

## 2025-03-17 RX ORDER — LEVOTHYROXINE SODIUM 100 UG/1
100 TABLET ORAL DAILY
Qty: 30 TABLET | Refills: 0 | Status: SHIPPED | OUTPATIENT
Start: 2025-03-17

## 2025-03-17 NOTE — TELEPHONE ENCOUNTER
Rx Refill Note  Requested Prescriptions     Pending Prescriptions Disp Refills    levothyroxine (SYNTHROID, LEVOTHROID) 100 MCG tablet [Pharmacy Med Name: LEVOTHYROXINE 100 MCG TABLET] 90 tablet 0     Sig: TAKE 1 TABLET BY MOUTH EVERY DAY      Last office visit with prescribing clinician: 12/16/2024   Last telemedicine visit with prescribing clinician: Visit date not found   Next office visit with prescribing clinician: 3/17/2025                         Would you like a call back once the refill request has been completed: [] Yes [] No    If the office needs to give you a call back, can they leave a voicemail: [] Yes [] No    Maryellen Saravia RN  03/17/25, 07:15 CDT
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
## 2025-03-18 LAB
25(OH)D3+25(OH)D2 SERPL-MCNC: 48.2 NG/ML (ref 30–100)
ALBUMIN SERPL-MCNC: 4.4 G/DL (ref 3.9–4.9)
ALP SERPL-CCNC: 73 IU/L (ref 44–121)
ALT SERPL-CCNC: 29 IU/L (ref 0–32)
AST SERPL-CCNC: 32 IU/L (ref 0–40)
BASOPHILS # BLD AUTO: 0 X10E3/UL (ref 0–0.2)
BASOPHILS NFR BLD AUTO: 0 %
BILIRUB SERPL-MCNC: 0.2 MG/DL (ref 0–1.2)
BUN SERPL-MCNC: 11 MG/DL (ref 8–27)
BUN/CREAT SERPL: 14 (ref 12–28)
CALCIUM SERPL-MCNC: 9.4 MG/DL (ref 8.7–10.3)
CHLORIDE SERPL-SCNC: 105 MMOL/L (ref 96–106)
CHOLEST SERPL-MCNC: 145 MG/DL (ref 100–199)
CO2 SERPL-SCNC: 24 MMOL/L (ref 20–29)
CREAT SERPL-MCNC: 0.77 MG/DL (ref 0.57–1)
EGFRCR SERPLBLD CKD-EPI 2021: 83 ML/MIN/1.73
EOSINOPHIL # BLD AUTO: 0.1 X10E3/UL (ref 0–0.4)
EOSINOPHIL NFR BLD AUTO: 2 %
ERYTHROCYTE [DISTWIDTH] IN BLOOD BY AUTOMATED COUNT: 14.2 % (ref 11.7–15.4)
GLOBULIN SER CALC-MCNC: 2.4 G/DL (ref 1.5–4.5)
GLUCOSE SERPL-MCNC: 138 MG/DL (ref 70–99)
HBA1C MFR BLD: 8.4 % (ref 4.8–5.6)
HCT VFR BLD AUTO: 41.3 % (ref 34–46.6)
HDLC SERPL-MCNC: 34 MG/DL
HGB BLD-MCNC: 13.3 G/DL (ref 11.1–15.9)
IMM GRANULOCYTES # BLD AUTO: 0 X10E3/UL (ref 0–0.1)
IMM GRANULOCYTES NFR BLD AUTO: 0 %
LDLC SERPL CALC-MCNC: 95 MG/DL (ref 0–99)
LYMPHOCYTES # BLD AUTO: 2.6 X10E3/UL (ref 0.7–3.1)
LYMPHOCYTES NFR BLD AUTO: 39 %
MAGNESIUM SERPL-MCNC: 2 MG/DL (ref 1.6–2.3)
MCH RBC QN AUTO: 27.5 PG (ref 26.6–33)
MCHC RBC AUTO-ENTMCNC: 32.2 G/DL (ref 31.5–35.7)
MCV RBC AUTO: 86 FL (ref 79–97)
MONOCYTES # BLD AUTO: 0.5 X10E3/UL (ref 0.1–0.9)
MONOCYTES NFR BLD AUTO: 7 %
NEUTROPHILS # BLD AUTO: 3.4 X10E3/UL (ref 1.4–7)
NEUTROPHILS NFR BLD AUTO: 52 %
PLATELET # BLD AUTO: 200 X10E3/UL (ref 150–450)
POTASSIUM SERPL-SCNC: 4.1 MMOL/L (ref 3.5–5.2)
PROT SERPL-MCNC: 6.8 G/DL (ref 6–8.5)
RBC # BLD AUTO: 4.83 X10E6/UL (ref 3.77–5.28)
SODIUM SERPL-SCNC: 140 MMOL/L (ref 134–144)
T4 FREE SERPL-MCNC: 0.94 NG/DL (ref 0.82–1.77)
TRIGL SERPL-MCNC: 80 MG/DL (ref 0–149)
TSH SERPL DL<=0.005 MIU/L-ACNC: 9.95 UIU/ML (ref 0.45–4.5)
VIT B12 SERPL-MCNC: 648 PG/ML (ref 232–1245)
VLDLC SERPL CALC-MCNC: 16 MG/DL (ref 5–40)
WBC # BLD AUTO: 6.6 X10E3/UL (ref 3.4–10.8)

## 2025-04-10 DIAGNOSIS — M79.609 PARESTHESIA AND PAIN OF RIGHT EXTREMITY: ICD-10-CM

## 2025-04-10 DIAGNOSIS — R20.2 PARESTHESIA AND PAIN OF RIGHT EXTREMITY: ICD-10-CM

## 2025-04-10 RX ORDER — GABAPENTIN 300 MG/1
300 CAPSULE ORAL 2 TIMES DAILY
Qty: 180 CAPSULE | Refills: 0 | Status: SHIPPED | OUTPATIENT
Start: 2025-04-10

## 2025-04-13 DIAGNOSIS — Z79.4 TYPE 2 DIABETES MELLITUS WITH HYPERGLYCEMIA, WITH LONG-TERM CURRENT USE OF INSULIN: ICD-10-CM

## 2025-04-13 DIAGNOSIS — E11.65 TYPE 2 DIABETES MELLITUS WITH HYPERGLYCEMIA, WITH LONG-TERM CURRENT USE OF INSULIN: ICD-10-CM

## 2025-04-14 DIAGNOSIS — E03.9 HYPOTHYROIDISM, UNSPECIFIED: ICD-10-CM

## 2025-04-14 RX ORDER — LEVOTHYROXINE SODIUM 112 UG/1
112 TABLET ORAL DAILY
Qty: 90 TABLET | Refills: 0 | Status: SHIPPED | OUTPATIENT
Start: 2025-04-14

## 2025-04-14 RX ORDER — INSULIN GLARGINE 100 [IU]/ML
30 INJECTION, SOLUTION SUBCUTANEOUS NIGHTLY
Qty: 30 ML | Refills: 0 | Status: SHIPPED | OUTPATIENT
Start: 2025-04-14

## 2025-04-29 ENCOUNTER — HOSPITAL ENCOUNTER (OUTPATIENT)
Dept: CT IMAGING | Facility: HOSPITAL | Age: 70
Discharge: HOME | End: 2025-04-29
Admitting: NURSE PRACTITIONER
Payer: COMMERCIAL

## 2025-04-29 ENCOUNTER — OFFICE VISIT (OUTPATIENT)
Dept: GASTROENTEROLOGY | Facility: CLINIC | Age: 70
End: 2025-04-29
Payer: COMMERCIAL

## 2025-04-29 VITALS
SYSTOLIC BLOOD PRESSURE: 122 MMHG | OXYGEN SATURATION: 95 % | WEIGHT: 192 LBS | DIASTOLIC BLOOD PRESSURE: 78 MMHG | HEART RATE: 60 BPM | HEIGHT: 67 IN | BODY MASS INDEX: 30.13 KG/M2 | TEMPERATURE: 96.9 F

## 2025-04-29 DIAGNOSIS — R10.13 EPIGASTRIC PAIN: ICD-10-CM

## 2025-04-29 DIAGNOSIS — R11.0 NAUSEA: ICD-10-CM

## 2025-04-29 DIAGNOSIS — R11.0 NAUSEA: Primary | ICD-10-CM

## 2025-04-29 LAB
ALBUMIN SERPL-MCNC: 4.5 G/DL (ref 3.5–5.2)
ALBUMIN/GLOB SERPL: 1.5 G/DL
ALP SERPL-CCNC: 70 U/L (ref 39–117)
ALT SERPL W P-5'-P-CCNC: 21 U/L (ref 1–33)
AMYLASE SERPL-CCNC: 35 U/L (ref 28–100)
ANION GAP SERPL CALCULATED.3IONS-SCNC: 12 MMOL/L (ref 5–15)
AST SERPL-CCNC: 25 U/L (ref 1–32)
BILIRUB SERPL-MCNC: 0.3 MG/DL (ref 0–1.2)
BUN SERPL-MCNC: 12 MG/DL (ref 8–23)
BUN/CREAT SERPL: 18.8 (ref 7–25)
CALCIUM SPEC-SCNC: 9.9 MG/DL (ref 8.6–10.5)
CHLORIDE SERPL-SCNC: 102 MMOL/L (ref 98–107)
CO2 SERPL-SCNC: 25 MMOL/L (ref 22–29)
CREAT BLDA-MCNC: 0.9 MG/DL (ref 0.6–1.3)
CREAT SERPL-MCNC: 0.64 MG/DL (ref 0.57–1)
DEPRECATED RDW RBC AUTO: 44.8 FL (ref 37–54)
EGFRCR SERPLBLD CKD-EPI 2021: 95.8 ML/MIN/1.73
ERYTHROCYTE [DISTWIDTH] IN BLOOD BY AUTOMATED COUNT: 14.9 % (ref 12.3–15.4)
GLOBULIN UR ELPH-MCNC: 3.1 GM/DL
GLUCOSE SERPL-MCNC: 153 MG/DL (ref 65–99)
HCT VFR BLD AUTO: 41.1 % (ref 34–46.6)
HGB BLD-MCNC: 13.8 G/DL (ref 12–15.9)
LIPASE SERPL-CCNC: 19 U/L (ref 13–60)
MCH RBC QN AUTO: 27.6 PG (ref 26.6–33)
MCHC RBC AUTO-ENTMCNC: 33.6 G/DL (ref 31.5–35.7)
MCV RBC AUTO: 82.2 FL (ref 79–97)
PLATELET # BLD AUTO: 215 10*3/MM3 (ref 140–450)
PMV BLD AUTO: 10.6 FL (ref 6–12)
POTASSIUM SERPL-SCNC: 4.3 MMOL/L (ref 3.5–5.2)
PROT SERPL-MCNC: 7.6 G/DL (ref 6–8.5)
RBC # BLD AUTO: 5 10*6/MM3 (ref 3.77–5.28)
SODIUM SERPL-SCNC: 139 MMOL/L (ref 136–145)
WBC NRBC COR # BLD AUTO: 8.75 10*3/MM3 (ref 3.4–10.8)

## 2025-04-29 PROCEDURE — 36415 COLL VENOUS BLD VENIPUNCTURE: CPT | Performed by: NURSE PRACTITIONER

## 2025-04-29 PROCEDURE — 82150 ASSAY OF AMYLASE: CPT | Performed by: NURSE PRACTITIONER

## 2025-04-29 PROCEDURE — 80053 COMPREHEN METABOLIC PANEL: CPT | Performed by: NURSE PRACTITIONER

## 2025-04-29 PROCEDURE — 99214 OFFICE O/P EST MOD 30 MIN: CPT | Performed by: NURSE PRACTITIONER

## 2025-04-29 PROCEDURE — 82565 ASSAY OF CREATININE: CPT

## 2025-04-29 PROCEDURE — 85027 COMPLETE CBC AUTOMATED: CPT | Performed by: NURSE PRACTITIONER

## 2025-04-29 PROCEDURE — 74177 CT ABD & PELVIS W/CONTRAST: CPT

## 2025-04-29 PROCEDURE — 83690 ASSAY OF LIPASE: CPT | Performed by: NURSE PRACTITIONER

## 2025-04-29 PROCEDURE — 25510000001 IOPAMIDOL 61 % SOLUTION: Performed by: NURSE PRACTITIONER

## 2025-04-29 RX ORDER — IOPAMIDOL 612 MG/ML
100 INJECTION, SOLUTION INTRAVASCULAR
Status: COMPLETED | OUTPATIENT
Start: 2025-04-29 | End: 2025-04-29

## 2025-04-29 RX ADMIN — IOPAMIDOL 100 ML: 612 INJECTION, SOLUTION INTRAVENOUS at 14:28

## 2025-04-29 NOTE — ASSESSMENT & PLAN NOTE
Stat CT to rule out acute pancreatitis. Labs today amylase, lipase, cbc, cmp  Pt instructed to fast until today's CT then go to clear liquid diet only
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
## 2025-04-30 ENCOUNTER — RESULTS FOLLOW-UP (OUTPATIENT)
Dept: GASTROENTEROLOGY | Facility: CLINIC | Age: 70
End: 2025-04-30
Payer: COMMERCIAL

## 2025-04-30 DIAGNOSIS — R11.0 NAUSEA: ICD-10-CM

## 2025-04-30 DIAGNOSIS — R10.13 EPIGASTRIC PAIN: Primary | ICD-10-CM

## 2025-04-30 RX ORDER — PANTOPRAZOLE SODIUM 40 MG/1
40 TABLET, DELAYED RELEASE ORAL DAILY
Qty: 30 TABLET | Refills: 11 | Status: SHIPPED | OUTPATIENT
Start: 2025-04-30

## 2025-04-30 NOTE — TELEPHONE ENCOUNTER
Called and spoke to pt re: results and Neva's recommendations-she VU. She will d/c Omeprazole and start on Pantoprazole. Pt also picking up her stool kit for H. Pylori tomorrow. I was unable to transfer her to anyone to schedule a f/u with Neva-I advised pt I would have Janel call her later today or tomorrow to get that appt scheduled. Pt advised to call me back with any further questions/problems.

## 2025-05-05 ENCOUNTER — LAB (OUTPATIENT)
Dept: LAB | Facility: HOSPITAL | Age: 70
End: 2025-05-05
Payer: COMMERCIAL

## 2025-05-05 DIAGNOSIS — B96.81 HELICOBACTER PYLORI GASTRITIS: ICD-10-CM

## 2025-05-05 DIAGNOSIS — K29.70 HELICOBACTER PYLORI GASTRITIS: ICD-10-CM

## 2025-05-05 PROCEDURE — 87338 HPYLORI STOOL AG IA: CPT

## 2025-05-06 DIAGNOSIS — R42 DIZZINESS: ICD-10-CM

## 2025-05-06 LAB — H PYLORI AG STL QL IA: NEGATIVE

## 2025-05-06 RX ORDER — HYDROXYZINE HYDROCHLORIDE 25 MG/1
25 TABLET, FILM COATED ORAL DAILY PRN
Qty: 90 TABLET | Refills: 0 | Status: SHIPPED | OUTPATIENT
Start: 2025-05-06

## 2025-06-10 ENCOUNTER — OFFICE VISIT (OUTPATIENT)
Dept: GASTROENTEROLOGY | Facility: CLINIC | Age: 70
End: 2025-06-10
Payer: COMMERCIAL

## 2025-06-10 VITALS
TEMPERATURE: 97.6 F | BODY MASS INDEX: 30.13 KG/M2 | WEIGHT: 192 LBS | SYSTOLIC BLOOD PRESSURE: 132 MMHG | OXYGEN SATURATION: 98 % | HEIGHT: 67 IN | DIASTOLIC BLOOD PRESSURE: 82 MMHG | HEART RATE: 63 BPM

## 2025-06-10 DIAGNOSIS — R11.0 NAUSEA: ICD-10-CM

## 2025-06-10 DIAGNOSIS — K59.00 CONSTIPATION, UNSPECIFIED CONSTIPATION TYPE: Primary | ICD-10-CM

## 2025-06-10 PROCEDURE — 99213 OFFICE O/P EST LOW 20 MIN: CPT | Performed by: NURSE PRACTITIONER

## 2025-06-10 RX ORDER — OMEPRAZOLE 20 MG/1
20 CAPSULE, DELAYED RELEASE ORAL DAILY
COMMUNITY
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
## 2025-06-18 ENCOUNTER — RESULTS FOLLOW-UP (OUTPATIENT)
Dept: INTERNAL MEDICINE | Facility: CLINIC | Age: 70
End: 2025-06-18

## 2025-06-18 ENCOUNTER — OFFICE VISIT (OUTPATIENT)
Dept: INTERNAL MEDICINE | Facility: CLINIC | Age: 70
End: 2025-06-18
Payer: COMMERCIAL

## 2025-06-18 VITALS
BODY MASS INDEX: 30.61 KG/M2 | DIASTOLIC BLOOD PRESSURE: 69 MMHG | HEART RATE: 65 BPM | TEMPERATURE: 98.6 F | HEIGHT: 67 IN | RESPIRATION RATE: 16 BRPM | OXYGEN SATURATION: 96 % | SYSTOLIC BLOOD PRESSURE: 129 MMHG | WEIGHT: 195 LBS

## 2025-06-18 DIAGNOSIS — E11.65 TYPE 2 DIABETES MELLITUS WITH HYPERGLYCEMIA, WITH LONG-TERM CURRENT USE OF INSULIN: Primary | ICD-10-CM

## 2025-06-18 DIAGNOSIS — E03.9 HYPOTHYROIDISM (ACQUIRED): ICD-10-CM

## 2025-06-18 DIAGNOSIS — M54.9 MID BACK PAIN: ICD-10-CM

## 2025-06-18 DIAGNOSIS — Z79.4 TYPE 2 DIABETES MELLITUS WITH HYPERGLYCEMIA, WITH LONG-TERM CURRENT USE OF INSULIN: Primary | ICD-10-CM

## 2025-06-18 DIAGNOSIS — K86.1 CHRONIC PANCREATITIS, UNSPECIFIED PANCREATITIS TYPE: ICD-10-CM

## 2025-06-18 DIAGNOSIS — R20.2 PARESTHESIA AND PAIN OF RIGHT EXTREMITY: ICD-10-CM

## 2025-06-18 DIAGNOSIS — R80.9 TYPE 2 DIABETES MELLITUS WITH MICROALBUMINURIA: Primary | ICD-10-CM

## 2025-06-18 DIAGNOSIS — M79.609 PARESTHESIA AND PAIN OF RIGHT EXTREMITY: ICD-10-CM

## 2025-06-18 DIAGNOSIS — E11.29 TYPE 2 DIABETES MELLITUS WITH MICROALBUMINURIA: Primary | ICD-10-CM

## 2025-06-18 LAB
EXPIRATION DATE: ABNORMAL
HBA1C MFR BLD: 8.2 % (ref 4.5–5.7)
Lab: ABNORMAL

## 2025-06-18 RX ORDER — ACYCLOVIR 400 MG/1
1 TABLET ORAL
Qty: 9 EACH | Refills: 3 | Status: SHIPPED | OUTPATIENT
Start: 2025-06-18

## 2025-06-18 RX ORDER — ACYCLOVIR 400 MG/1
1 TABLET ORAL CONTINUOUS PRN
Qty: 1 EACH | Refills: 0 | Status: SHIPPED | OUTPATIENT
Start: 2025-06-18
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
## 2025-06-19 LAB
ALBUMIN/CREAT UR: 38 MG/G CREAT (ref 0–29)
CREAT UR-MCNC: 28.7 MG/DL
MICROALBUMIN UR-MCNC: 11 UG/ML
T3FREE SERPL-MCNC: 2.9 PG/ML (ref 2–4.4)
T4 FREE SERPL-MCNC: 1.03 NG/DL (ref 0.82–1.77)
TSH SERPL DL<=0.005 MIU/L-ACNC: 5.91 UIU/ML (ref 0.45–4.5)

## 2025-06-25 NOTE — PROGRESS NOTES
Spoke with pt regarding labs.  Pt voiced understanding and will call with any issues or concerns.  Pt states that she cannot take either of those medications due to other medical issues she has going on and would like to know if there is anything else she can do

## 2025-06-30 RX ORDER — FINERENONE 20 MG/1
20 TABLET, FILM COATED ORAL DAILY
Qty: 30 TABLET | Refills: 2 | Status: SHIPPED | OUTPATIENT
Start: 2025-06-30

## 2025-06-30 RX ORDER — OXYCODONE HYDROCHLORIDE 5 MG/1
5 TABLET ORAL EVERY 6 HOURS PRN
Qty: 12 TABLET | Refills: 0 | Status: SHIPPED | OUTPATIENT
Start: 2025-06-30

## 2025-06-30 NOTE — TELEPHONE ENCOUNTER
Called and discussed Kerendia for chronic kidney disease.  She was willing to try this.  Will go ahead and send this into the pharmacy.  Reviewed would like to have labs to recheck potassium and GFR levels after 1 month.  She further reports having experienced pancreatitis flare recently and has had severe pain.  She was previously prescribed oxycodone that she had taken with relief 9 months ago.  Will go ahead and send in again for her.

## 2025-07-07 RX ORDER — MONTELUKAST SODIUM 10 MG/1
10 TABLET ORAL DAILY
Qty: 90 TABLET | Refills: 1 | OUTPATIENT
Start: 2025-07-07

## 2025-07-07 RX ORDER — MONTELUKAST SODIUM 10 MG/1
10 TABLET ORAL DAILY
Qty: 90 TABLET | Refills: 1 | Status: SHIPPED | OUTPATIENT
Start: 2025-07-07

## 2025-07-10 DIAGNOSIS — E03.9 HYPOTHYROIDISM, UNSPECIFIED: ICD-10-CM

## 2025-07-10 DIAGNOSIS — E11.65 TYPE 2 DIABETES MELLITUS WITH HYPERGLYCEMIA, WITH LONG-TERM CURRENT USE OF INSULIN: ICD-10-CM

## 2025-07-10 DIAGNOSIS — Z79.4 TYPE 2 DIABETES MELLITUS WITH HYPERGLYCEMIA, WITH LONG-TERM CURRENT USE OF INSULIN: ICD-10-CM

## 2025-07-10 RX ORDER — INSULIN GLARGINE 100 [IU]/ML
32 INJECTION, SOLUTION SUBCUTANEOUS NIGHTLY
Qty: 12 ML | Refills: 2 | Status: SHIPPED | OUTPATIENT
Start: 2025-07-10

## 2025-07-10 RX ORDER — LEVOTHYROXINE SODIUM 112 UG/1
112 TABLET ORAL DAILY
Qty: 90 TABLET | Refills: 0 | Status: SHIPPED | OUTPATIENT
Start: 2025-07-10

## 2025-07-30 ENCOUNTER — LAB (OUTPATIENT)
Dept: INTERNAL MEDICINE | Facility: CLINIC | Age: 70
End: 2025-07-30
Payer: COMMERCIAL

## 2025-07-30 DIAGNOSIS — R80.9 TYPE 2 DIABETES MELLITUS WITH MICROALBUMINURIA: ICD-10-CM

## 2025-07-30 DIAGNOSIS — E11.29 TYPE 2 DIABETES MELLITUS WITH MICROALBUMINURIA: ICD-10-CM

## 2025-07-31 DIAGNOSIS — G43.909 MIGRAINE WITHOUT STATUS MIGRAINOSUS, NOT INTRACTABLE, UNSPECIFIED MIGRAINE TYPE: Primary | ICD-10-CM

## 2025-07-31 DIAGNOSIS — R80.9 TYPE 2 DIABETES MELLITUS WITH MICROALBUMINURIA: ICD-10-CM

## 2025-07-31 DIAGNOSIS — E11.29 TYPE 2 DIABETES MELLITUS WITH MICROALBUMINURIA: ICD-10-CM

## 2025-07-31 LAB
BUN SERPL-MCNC: 11 MG/DL (ref 8–27)
BUN/CREAT SERPL: 14 (ref 12–28)
CALCIUM SERPL-MCNC: 9.6 MG/DL (ref 8.7–10.3)
CHLORIDE SERPL-SCNC: 103 MMOL/L (ref 96–106)
CO2 SERPL-SCNC: 23 MMOL/L (ref 20–29)
CREAT SERPL-MCNC: 0.78 MG/DL (ref 0.57–1)
EGFRCR SERPLBLD CKD-EPI 2021: 82 ML/MIN/1.73
GLUCOSE SERPL-MCNC: 171 MG/DL (ref 70–99)
POTASSIUM SERPL-SCNC: 4.5 MMOL/L (ref 3.5–5.2)
SODIUM SERPL-SCNC: 138 MMOL/L (ref 134–144)

## 2025-07-31 RX ORDER — RIZATRIPTAN BENZOATE 10 MG/1
10 TABLET ORAL DAILY PRN
Qty: 10 TABLET | Refills: 5 | Status: SHIPPED | OUTPATIENT
Start: 2025-07-31

## 2025-07-31 RX ORDER — FINERENONE 20 MG/1
20 TABLET, FILM COATED ORAL DAILY
Qty: 90 TABLET | Refills: 3 | Status: SHIPPED | OUTPATIENT
Start: 2025-07-31

## 2025-08-03 DIAGNOSIS — R42 DIZZINESS: ICD-10-CM

## 2025-08-04 DIAGNOSIS — R42 DIZZINESS: ICD-10-CM

## 2025-08-04 RX ORDER — HYDROXYZINE HYDROCHLORIDE 25 MG/1
25 TABLET, FILM COATED ORAL DAILY PRN
Qty: 90 TABLET | Refills: 0 | Status: SHIPPED | OUTPATIENT
Start: 2025-08-04

## 2025-08-04 RX ORDER — HYDROXYZINE HYDROCHLORIDE 25 MG/1
25 TABLET, FILM COATED ORAL DAILY PRN
Qty: 90 TABLET | Refills: 0 | OUTPATIENT
Start: 2025-08-04

## 2025-08-15 DIAGNOSIS — E11.65 TYPE 2 DIABETES MELLITUS WITH HYPERGLYCEMIA, WITHOUT LONG-TERM CURRENT USE OF INSULIN: ICD-10-CM

## 2025-08-19 RX ORDER — GLIPIZIDE 10 MG/1
20 TABLET, FILM COATED, EXTENDED RELEASE ORAL DAILY
Qty: 180 TABLET | Refills: 1 | Status: SHIPPED | OUTPATIENT
Start: 2025-08-19

## (undated) DEVICE — FRCP BX RADJAW4 NDL 2.8 240 STD OG

## (undated) DEVICE — Device: Brand: DEFENDO AIR/WATER/SUCTION AND BIOPSY VALVE

## (undated) DEVICE — THE CHANNEL CLEANING BRUSH IS A NYLON FLEXI BRUSH ATTACHED TO A FLEXIBLE PLASTIC SHEATH DESIGNED TO SAFELY REMOVE DEBRIS FROM FLEXIBLE ENDOSCOPES.

## (undated) DEVICE — CUFF,BP,DISP,1 TUBE,ADULT,HP: Brand: MEDLINE

## (undated) DEVICE — CONMED SCOPE SAVER BITE BLOCK, 20X27 MM: Brand: SCOPE SAVER

## (undated) DEVICE — SENSR O2 OXIMAX FNGR A/ 18IN NONSTR

## (undated) DEVICE — ARGYLE YANKAUER BULB TIP WITH VENT: Brand: ARGYLE

## (undated) DEVICE — YANKAUER,BULB TIP WITH VENT: Brand: ARGYLE

## (undated) DEVICE — MASK,OXYGEN,MED CONC,ADLT,7' TUB, UC: Brand: PENDING